# Patient Record
Sex: FEMALE | Race: WHITE | NOT HISPANIC OR LATINO | Employment: OTHER | URBAN - METROPOLITAN AREA
[De-identification: names, ages, dates, MRNs, and addresses within clinical notes are randomized per-mention and may not be internally consistent; named-entity substitution may affect disease eponyms.]

---

## 2017-01-26 ENCOUNTER — INFUSION (OUTPATIENT)
Dept: INFUSION THERAPY | Facility: HOSPITAL | Age: 82
End: 2017-01-26
Attending: INTERNAL MEDICINE
Payer: MEDICARE

## 2017-01-26 VITALS
TEMPERATURE: 98 F | RESPIRATION RATE: 16 BRPM | SYSTOLIC BLOOD PRESSURE: 152 MMHG | DIASTOLIC BLOOD PRESSURE: 58 MMHG | HEART RATE: 59 BPM

## 2017-01-26 DIAGNOSIS — N18.9 ANEMIA ASSOCIATED WITH CHRONIC RENAL FAILURE: Primary | ICD-10-CM

## 2017-01-26 DIAGNOSIS — N18.4 CKD (CHRONIC KIDNEY DISEASE), STAGE IV: ICD-10-CM

## 2017-01-26 DIAGNOSIS — D63.1 ANEMIA ASSOCIATED WITH CHRONIC RENAL FAILURE: Primary | ICD-10-CM

## 2017-01-26 PROCEDURE — 96372 THER/PROPH/DIAG INJ SC/IM: CPT

## 2017-01-26 PROCEDURE — 63600175 PHARM REV CODE 636 W HCPCS: Performed by: INTERNAL MEDICINE

## 2017-01-26 PROCEDURE — 25000003 PHARM REV CODE 250: Performed by: INTERNAL MEDICINE

## 2017-01-26 PROCEDURE — 96365 THER/PROPH/DIAG IV INF INIT: CPT

## 2017-01-26 RX ORDER — FERUMOXYTOL 510 MG/17ML
510 INJECTION INTRAVENOUS
Status: CANCELLED
Start: 2017-02-20 | End: 2017-02-20

## 2017-01-26 RX ORDER — SODIUM CHLORIDE 0.9 % (FLUSH) 0.9 %
10 SYRINGE (ML) INJECTION
Status: CANCELLED | OUTPATIENT
Start: 2017-02-20

## 2017-01-26 RX ORDER — HEPARIN 100 UNIT/ML
500 SYRINGE INTRAVENOUS
Status: CANCELLED | OUTPATIENT
Start: 2017-02-20

## 2017-01-26 RX ADMIN — DARBEPOETIN ALFA 100 MCG: 100 SOLUTION INTRAVENOUS; SUBCUTANEOUS at 10:01

## 2017-01-26 RX ADMIN — FERUMOXYTOL: 510 INJECTION INTRAVENOUS at 10:01

## 2017-01-26 NOTE — MR AVS SNAPSHOT
Patient Information     Patient Name Sex Brynn Price Female 1930      Visit Information        Provider Department Dept Phone Center    2017 10:30 AM [facility] [place] [facility] [phone] [facility]      Patient Instructions      [facility]  [address]  [address] [address]  [phone] phone     [phone] fax  Hours of Operation: Monday- Friday 8:00am- 5:00pm  After hours phone  [phone]  Hematology / Oncology Physicians on call      Dr. Neo Velarde    Please call with any concerns regarding your appointment today.FALL PREVENTION   Falls often occur due to slipping, tripping or losing your balance. Here are ways to reduce your risk of falling again.   Was there anything that caused your fall that can be fixed, removed or replaced?   Make your home safe by keeping walkways clear of objects you may trip over.   Use non-slip pads under rugs.   Do not walk in poorly lit areas.   Do not stand on chairs or wobbly ladders.   Use caution when reaching overhead or looking upward. This position can cause a loss of balance.   Be sure your shoes fit properly, have non-slip bottoms and are in good condition.   Be cautious when going up and down stairs, curbs, and when walking on uneven sidewalks.   If your balance is poor, consider using a cane or walker.   If your fall was related to alcohol use, stop or limit alcohol intake.   If your fall was related to use of sleeping medicines, talk to your doctor about this. You may need to reduce your dosage at bedtime if you awaken during the night to go to the bathroom.   To reduce the need for nighttime bathroom trips:   Avoid drinking fluids for several hours before going to bed   Empty your bladder before going to bed   Men can keep a urinal at the bedside   © 6764-5053 [employer], [address] [place] [address], [place] [address]. All rights  reserved. This information is not intended as a substitute for professional medical care. Always follow your healthcare professional's instructions.         Your Current Medications Are     allopurinol (ZYLOPRIM) 100 MG tablet    blood sugar diagnostic (ONETOUCH ULTRA TEST) Strp    carvedilol (COREG) 12.5 MG tablet    furosemide (LASIX) 40 MG tablet    glimepiride (AMARYL) 1 MG tablet      Facility-Administered Medications     darbepoetin flaquito-polysorbate injection 100 mcg    darbepoetin flaquito-polysorbate injection 100 mcg    dextrose 5 % with ferumoxytol (FERAHEME) 510 mg    ferumoxytol (FERAHEME) 510 mg in dextrose 5 % 100 mL IVPB    sodium chloride 0.9% 100 mL flush bag    sodium chloride 0.9% flush 10 mL      Appointments for Next Year     2/7/2017 11:40 AM NON FASTING LAB (10 min.) [facility], [facility]    Arrive at check-in approximately 15 minutes before your scheduled appointment time. Bring all outside medical records and imaging, along with a list of your current medications and insurance card.    2/7/2017 12:10 PM URINE (10 min.) [facility] SPECIMEN, [facility]    Arrive at check-in approximately 15 minutes before your scheduled appointment time. Bring all outside medical records and imaging, along with a list of your current medications and insurance card.    2/14/2017 12:00 PM ESTABLISHED PATIENT (30 min.) [facility]rology Neeta Oquendo MD    Arrive at check-in approximately 15 minutes before your scheduled appointment time. Bring all outside medical records and imaging, along with a list of your current medications and insurance card.    (off [facility]) 2nd floor         Default Flowsheet Data (last 24 hours)      Amb Complex Vitals [facility]        01/26/17 1111 01/26/17 0950 01/26/17 0946          Measurements    BP (!)  152/58 (!)  160/62       Temp  97.7 °F (36.5 °C)       Pulse (!)  59 61       Resp  16       Pain Assessment    Pain Score   Zero               Allergies     Ace Inhibitors     Caused hyperkalemia    Arb-angiotensin Receptor Antagonist Other (See Comments)    hyperkalemia    Codeine Nausea Only    Morphine Nausea Only      Medications You Received from 01/25/2017 1115 to 01/26/2017 1115        Date/Time Order Dose Route Action     01/26/2017 1011 darbepoetin flaquito-polysorbate injection 100 mcg 100 mcg Subcutaneous Given     01/26/2017 1009 dextrose 5 % with ferumoxytol (FERAHEME) 510 mg   Intravenous Given      Current Discharge Medication List     Cannot display discharge medications since this is not an admission.

## 2017-01-26 NOTE — PATIENT INSTRUCTIONS
[facility]  [address]  [address] [address]  [phone] phone     [phone] fax  Hours of Operation: Monday- Friday 8:00am- 5:00pm  After hours phone  [phone]  Hematology / Oncology Physicians on call      Dr. Neo Velarde    Please call with any concerns regarding your appointment today.FALL PREVENTION   Falls often occur due to slipping, tripping or losing your balance. Here are ways to reduce your risk of falling again.   Was there anything that caused your fall that can be fixed, removed or replaced?   Make your home safe by keeping walkways clear of objects you may trip over.   Use non-slip pads under rugs.   Do not walk in poorly lit areas.   Do not stand on chairs or wobbly ladders.   Use caution when reaching overhead or looking upward. This position can cause a loss of balance.   Be sure your shoes fit properly, have non-slip bottoms and are in good condition.   Be cautious when going up and down stairs, curbs, and when walking on uneven sidewalks.   If your balance is poor, consider using a cane or walker.   If your fall was related to alcohol use, stop or limit alcohol intake.   If your fall was related to use of sleeping medicines, talk to your doctor about this. You may need to reduce your dosage at bedtime if you awaken during the night to go to the bathroom.   To reduce the need for nighttime bathroom trips:   Avoid drinking fluids for several hours before going to bed   Empty your bladder before going to bed   Men can keep a urinal at the bedside   © 9705-5451 [employer], [address] [place] [address], [place] [address]. All rights reserved. This information is not intended as a substitute for professional medical care. Always follow your healthcare professional's instructions.

## 2017-01-26 NOTE — NURSING
Injection given without difficulties.Bandaid applied. Patient instructed to stay in the clinic for 15 minutes. Patient verbalized understanding and will notify nurse with any complaints.      1030 feraheme infusion complete

## 2017-01-26 NOTE — PLAN OF CARE
Problem: Patient Care Overview (Adult)  Goal: Plan of Care Review  Outcome: Ongoing (interventions implemented as appropriate)  i feel pretty good i had a little diarrhea last night but now its ok

## 2017-02-23 NOTE — TELEPHONE ENCOUNTER
----- Message from Marlene Stevenson sent at 2/23/2017  3:16 PM CST -----  [facility]  Pt's insurance is no longer covering pt's diabetic supplies. Pt's insurance will only cover accucheck or truemetrix. Pt will also need a new glucometer. Pls send rx to...    [facility] [id number]  [place], [place] - [address] [facility] [address] [place] [address] [address] [facility]  [address] [facility] [address]  [place] [address]  Phone: [phone] Fax: [phone]

## 2017-02-24 RX ORDER — LANCETS
1 EACH MISCELLANEOUS 3 TIMES DAILY
Qty: 100 EACH | Refills: 0 | Status: SHIPPED | OUTPATIENT
Start: 2017-02-24

## 2017-02-24 RX ORDER — LANCETS 26 GAUGE
1 EACH MISCELLANEOUS 3 TIMES DAILY
Qty: 100 EACH | Refills: 3 | Status: SHIPPED | OUTPATIENT
Start: 2017-02-24 | End: 2018-02-24

## 2017-02-24 RX ORDER — INSULIN PUMP SYRINGE, 3 ML
EACH MISCELLANEOUS
Qty: 1 EACH | Refills: 0 | Status: SHIPPED | OUTPATIENT
Start: 2017-02-24

## 2017-03-08 ENCOUNTER — LAB VISIT (OUTPATIENT)
Dept: LAB | Facility: HOSPITAL | Age: 82
End: 2017-03-08
Attending: INTERNAL MEDICINE
Payer: MEDICARE

## 2017-03-08 DIAGNOSIS — I73.9 PERIPHERAL VASCULAR DISEASE: ICD-10-CM

## 2017-03-08 DIAGNOSIS — E11.51 DM (DIABETES MELLITUS) WITH PERIPHERAL VASCULAR COMPLICATION: ICD-10-CM

## 2017-03-08 DIAGNOSIS — I10 ESSENTIAL HYPERTENSION: ICD-10-CM

## 2017-03-08 DIAGNOSIS — D63.1 ANEMIA ASSOCIATED WITH STAGE 4 CHRONIC RENAL FAILURE: ICD-10-CM

## 2017-03-08 DIAGNOSIS — N18.4 ANEMIA ASSOCIATED WITH STAGE 4 CHRONIC RENAL FAILURE: ICD-10-CM

## 2017-03-08 DIAGNOSIS — N18.4 CKD (CHRONIC KIDNEY DISEASE), STAGE 4 (SEVERE): ICD-10-CM

## 2017-03-08 DIAGNOSIS — E11.22 DIABETES MELLITUS WITH STAGE 4 CHRONIC KIDNEY DISEASE: ICD-10-CM

## 2017-03-08 DIAGNOSIS — M10.30 GOUT DUE TO RENAL IMPAIRMENT, UNSPECIFIED CHRONICITY, UNSPECIFIED SITE: ICD-10-CM

## 2017-03-08 DIAGNOSIS — N17.9 AKI (ACUTE KIDNEY INJURY): ICD-10-CM

## 2017-03-08 DIAGNOSIS — N18.4 DIABETES MELLITUS WITH STAGE 4 CHRONIC KIDNEY DISEASE: ICD-10-CM

## 2017-03-08 LAB
ALBUMIN SERPL BCP-MCNC: 3.3 G/DL
ANION GAP SERPL CALC-SCNC: 8 MMOL/L
ANISOCYTOSIS BLD QL SMEAR: SLIGHT
BASOPHILS # BLD AUTO: 0.02 K/UL
BASOPHILS NFR BLD: 0.2 %
BUN SERPL-MCNC: 44 MG/DL
CALCIUM SERPL-MCNC: 8.8 MG/DL
CHLORIDE SERPL-SCNC: 115 MMOL/L
CO2 SERPL-SCNC: 20 MMOL/L
CREAT SERPL-MCNC: 1.7 MG/DL
DIFFERENTIAL METHOD: ABNORMAL
EOSINOPHIL # BLD AUTO: 0.2 K/UL
EOSINOPHIL NFR BLD: 2.2 %
ERYTHROCYTE [DISTWIDTH] IN BLOOD BY AUTOMATED COUNT: 17 %
EST. GFR  (AFRICAN AMERICAN): 31 ML/MIN/1.73 M^2
EST. GFR  (NON AFRICAN AMERICAN): 26.9 ML/MIN/1.73 M^2
GLUCOSE SERPL-MCNC: 110 MG/DL
HCT VFR BLD AUTO: 24.9 %
HGB BLD-MCNC: 7.6 G/DL
HYPOCHROMIA BLD QL SMEAR: ABNORMAL
IRON SERPL-MCNC: 130 UG/DL
LYMPHOCYTES # BLD AUTO: 2.6 K/UL
LYMPHOCYTES NFR BLD: 27.2 %
MCH RBC QN AUTO: 32.9 PG
MCHC RBC AUTO-ENTMCNC: 30.5 %
MCV RBC AUTO: 108 FL
MONOCYTES # BLD AUTO: 0.9 K/UL
MONOCYTES NFR BLD: 9 %
NEUTROPHILS # BLD AUTO: 5.8 K/UL
NEUTROPHILS NFR BLD: 61.4 %
PHOSPHATE SERPL-MCNC: 3.5 MG/DL
PLATELET # BLD AUTO: 254 K/UL
PLATELET BLD QL SMEAR: ABNORMAL
PMV BLD AUTO: 11.5 FL
POTASSIUM SERPL-SCNC: 4.8 MMOL/L
PTH-INTACT SERPL-MCNC: 149 PG/ML
RBC # BLD AUTO: 2.31 M/UL
SATURATED IRON: 58 %
SODIUM SERPL-SCNC: 143 MMOL/L
TOTAL IRON BINDING CAPACITY: 223 UG/DL
TRANSFERRIN SERPL-MCNC: 151 MG/DL
WBC # BLD AUTO: 9.51 K/UL

## 2017-03-08 PROCEDURE — 85025 COMPLETE CBC W/AUTO DIFF WBC: CPT

## 2017-03-08 PROCEDURE — 83970 ASSAY OF PARATHORMONE: CPT

## 2017-03-08 PROCEDURE — 36415 COLL VENOUS BLD VENIPUNCTURE: CPT

## 2017-03-08 PROCEDURE — 80069 RENAL FUNCTION PANEL: CPT

## 2017-03-08 PROCEDURE — 83540 ASSAY OF IRON: CPT

## 2017-03-15 ENCOUNTER — OFFICE VISIT (OUTPATIENT)
Dept: NEPHROLOGY | Facility: CLINIC | Age: 82
End: 2017-03-15
Payer: MEDICARE

## 2017-03-15 ENCOUNTER — INFUSION (OUTPATIENT)
Dept: INFUSION THERAPY | Facility: HOSPITAL | Age: 82
End: 2017-03-15
Attending: INTERNAL MEDICINE
Payer: MEDICARE

## 2017-03-15 VITALS
OXYGEN SATURATION: 99 % | DIASTOLIC BLOOD PRESSURE: 67 MMHG | TEMPERATURE: 98 F | HEART RATE: 63 BPM | WEIGHT: 128.31 LBS | SYSTOLIC BLOOD PRESSURE: 187 MMHG | RESPIRATION RATE: 18 BRPM | BODY MASS INDEX: 23.61 KG/M2 | HEIGHT: 62 IN

## 2017-03-15 VITALS
HEART RATE: 68 BPM | DIASTOLIC BLOOD PRESSURE: 67 MMHG | BODY MASS INDEX: 23.61 KG/M2 | HEIGHT: 62 IN | SYSTOLIC BLOOD PRESSURE: 182 MMHG | WEIGHT: 128.31 LBS

## 2017-03-15 DIAGNOSIS — D64.9 CHRONIC ANEMIA: ICD-10-CM

## 2017-03-15 DIAGNOSIS — I10 ESSENTIAL HYPERTENSION: ICD-10-CM

## 2017-03-15 DIAGNOSIS — D63.1 ANEMIA ASSOCIATED WITH CHRONIC RENAL FAILURE: Primary | ICD-10-CM

## 2017-03-15 DIAGNOSIS — N18.4 CKD (CHRONIC KIDNEY DISEASE), STAGE IV: Primary | ICD-10-CM

## 2017-03-15 DIAGNOSIS — N25.81 SECONDARY HYPERPARATHYROIDISM (OF RENAL ORIGIN): ICD-10-CM

## 2017-03-15 DIAGNOSIS — N18.9 ANEMIA ASSOCIATED WITH CHRONIC RENAL FAILURE: Primary | ICD-10-CM

## 2017-03-15 DIAGNOSIS — N18.4 CKD (CHRONIC KIDNEY DISEASE), STAGE IV: ICD-10-CM

## 2017-03-15 DIAGNOSIS — M10.30 GOUT DUE TO RENAL IMPAIRMENT, UNSPECIFIED CHRONICITY, UNSPECIFIED SITE: ICD-10-CM

## 2017-03-15 PROCEDURE — 63600175 PHARM REV CODE 636 W HCPCS: Performed by: INTERNAL MEDICINE

## 2017-03-15 PROCEDURE — 96372 THER/PROPH/DIAG INJ SC/IM: CPT

## 2017-03-15 PROCEDURE — 1126F AMNT PAIN NOTED NONE PRSNT: CPT | Mod: S$GLB,,, | Performed by: INTERNAL MEDICINE

## 2017-03-15 PROCEDURE — 1160F RVW MEDS BY RX/DR IN RCRD: CPT | Mod: S$GLB,,, | Performed by: INTERNAL MEDICINE

## 2017-03-15 PROCEDURE — 99499 UNLISTED E&M SERVICE: CPT | Mod: S$GLB,,, | Performed by: INTERNAL MEDICINE

## 2017-03-15 PROCEDURE — 1159F MED LIST DOCD IN RCRD: CPT | Mod: S$GLB,,, | Performed by: INTERNAL MEDICINE

## 2017-03-15 PROCEDURE — 99214 OFFICE O/P EST MOD 30 MIN: CPT | Mod: S$GLB,,, | Performed by: INTERNAL MEDICINE

## 2017-03-15 PROCEDURE — 99999 PR PBB SHADOW E&M-EST. PATIENT-LVL III: CPT | Mod: PBBFAC,,, | Performed by: INTERNAL MEDICINE

## 2017-03-15 PROCEDURE — 1157F ADVNC CARE PLAN IN RCRD: CPT | Mod: S$GLB,,, | Performed by: INTERNAL MEDICINE

## 2017-03-15 RX ORDER — AMLODIPINE BESYLATE 5 MG/1
5 TABLET ORAL DAILY
Qty: 30 TABLET | Refills: 11 | Status: SHIPPED | OUTPATIENT
Start: 2017-03-15 | End: 2018-03-09 | Stop reason: SDUPTHER

## 2017-03-15 RX ADMIN — DARBEPOETIN ALFA 200 MCG: 100 SOLUTION INTRAVENOUS; SUBCUTANEOUS at 01:03

## 2017-03-15 NOTE — MR AVS SNAPSHOT
"Patient Information     Patient Name Sex Brynn Price Female 1930      Visit Information        Provider Department Dep Phone Center    3/15/2017 1:00 PM INJECTION, [facility] [phone] [facility]      Patient Instructions     None      Your Current Medications Are     allopurinol (ZYLOPRIM) 100 MG tablet    blood sugar diagnostic (ONETOUCH ULTRA TEST) Strp    blood sugar diagnostic Strp    blood-glucose meter kit    carvedilol (COREG) 12.5 MG tablet    furosemide (LASIX) 40 MG tablet    glimepiride (AMARYL) 1 MG tablet    lancets Misc    lancing device with lancets Kit    amlodipine (NORVASC) 5 MG tablet      Facility-Administered Medications     darbepoetin flaquito-polysorbate injection 100 mcg    darbepoetin flaquito-polysorbate injection 200 mcg    sodium chloride 0.9% flush 10 mL    darbepoetin flaquito-polysorbate injection 200 mcg (Discontinued)    ferumoxytol (FERAHEME) 510 mg in dextrose 5 % 100 mL IVPB (Discontinued)      Appointments for Next Year     5/10/2017 12:00 PM ESTABLISHED PATIENT (30 min.) [facility] Neeta Oquendo MD    Arrive at check-in approximately 15 minutes before your scheduled appointment time. Bring all outside medical records and imaging, along with a list of your current medications and insurance card.    (off [facility]) 2nd floor         Default Flowsheet Data (last 24 hours)      Amb Complex Vitals Lorenzo        03/15/17 1323 03/15/17 1233             Measurements    Weight 58.2 kg (128 lb 4.9 oz) 58.2 kg (128 lb 4.9 oz)       Height 5' 2" (1.575 m) 5' 2" (1.575 m)       BSA (Calculated - sq m) 1.6 sq meters 1.6 sq meters       BMI (Calculated) 23.5 23.5       BP (!)  187/67 (!)  182/67       Temp 98 °F (36.7 °C)        Pulse 63 68       Resp 18        SpO2 99 %        Pain Assessment    Pain Score Zero Zero               Allergies     Ace Inhibitors     Caused hyperkalemia    Arb-angiotensin Receptor Antagonist Other (See Comments)    " hyperkalemia    Codeine Nausea Only    Morphine Nausea Only      Medications You Received from 03/14/2017 1336 to 03/15/2017 1336        Date/Time Order Dose Route Action     03/15/2017 1334 darbepoetin flaquito-polysorbate injection 200 mcg 200 mcg Subcutaneous Given      Current Discharge Medication List     Cannot display discharge medications since this is not an admission.
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
## 2017-03-15 NOTE — MR AVS SNAPSHOT
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 [phone]      Goals (5 Years of Data)     None      Follow-Up and Disposition     Return in about 2 months (around 5/15/2017).    Follow-up and Disposition History       These Medications        Disp Refills Start End    amlodipine (NORVASC) 5 MG tablet 30 tablet 11 3/15/2017 3/15/2018    Take 1 tablet (5 mg total) by mouth once daily. - Oral    Pharmacy: [facility] [address] [place] [address] AT [facility] [place] [address] [address] Ph #: [phone]         [facility]     [facility] Nurse Care Line -  Assistance  Registered nurses in the [facility] provide clinical advisement, health education, appointment booking, and other advisory services.  Call for this free service at [phone].             Medications           Message regarding Medications     Verify the changes and/or additions to your medication regime listed below are the same as discussed with your clinician today.  If any of  "these changes or additions are incorrect, please notify your healthcare provider.        START taking these NEW medications        Refills    amlodipine (NORVASC) 5 MG tablet 11    Sig: Take 1 tablet (5 mg total) by mouth once daily.    Class: Normal    Route: Oral      STOP taking these medications     ferumoxytol (FERAHEME) 510 mg in dextrose 5 % 100 mL IVPB            Verify that the below list of medications is an accurate representation of the medications you are currently taking.  If none reported, the list may be blank. If incorrect, please contact your healthcare provider. Carry this list with you in case of emergency.           Current Medications     allopurinol (ZYLOPRIM) 100 MG tablet TAKE 2 TABLETS BY MOUTH ONCE DAILY    blood sugar diagnostic (ONETOUCH ULTRA TEST) Strp TEST BLOOD SUGAR EVERY DAY AS DIRECTED    blood sugar diagnostic Strp 1 strip by Misc.(Non-Drug; Combo Route) route 3 (three) times daily.    blood-glucose meter kit Use as instructed    carvedilol (COREG) 12.5 MG tablet Take 1 tablet (12.5 mg total) by mouth 2 (two) times daily.    furosemide (LASIX) 40 MG tablet Take 40 mg by mouth as needed.    glimepiride (AMARYL) 1 MG tablet Take 1 tablet (1 mg total) by mouth once daily.    lancets Misc 1 Stick by Misc.(Non-Drug; Combo Route) route 3 (three) times daily.    lancing device with lancets Kit 1 each by Misc.(Non-Drug; Combo Route) route 3 (three) times daily.    amlodipine (NORVASC) 5 MG tablet Take 1 tablet (5 mg total) by mouth once daily.           Clinical Reference Information           Your Vitals Were     BP Pulse Height Weight BMI    182/67 68 5' 2" (1.575 m) 58.2 kg (128 lb 4.9 oz) 23.47 kg/m2      Blood Pressure          Most Recent Value    BP  (!)  182/67      Allergies as of 3/15/2017     Ace Inhibitors    Arb-angiotensin Receptor Antagonist    Codeine    Morphine      Immunizations Administered on Date of Encounter - 3/15/2017     None      [facility] Sign-Up     " Activating your [facility] account is as easy as 1-2-3!     1) Visit [email or web address], select Sign Up Now, enter this activation code and your date of birth, then select Next.  [email or web address]  Expires: 4/29/2017 12:52 PM      2) Create a username and password to use when you visit [facility] in the future and select a security question in case you lose your password and select Next.    3) Enter your e-mail address and click Sign Up!    Additional Information  If you have questions, please e-mail [email or web address] or call [phone] to talk to our [facility] staff. Remember, [facility] is NOT to be used for urgent needs. For medical emergencies, dial 911.         Language Assistance Services     ATTENTION: Language assistance services are available, free of charge. Please call [phone].      ATENCIÓN: Si habla denise, tiene a moon disposición servicios gratuitos de asistencia lingüística. Llame al [phone].     CHÚ Ý: N?u b?n nói Ti?ng Vi?t, có các d?ch v? h? tr? ngôn ng? mi?n phí dành cho b?n. G?i s? [phone].         [facility] complies with applicable Federal civil rights laws and does not discriminate on the basis of race, color, national origin, age, disability, or sex.

## 2017-05-10 ENCOUNTER — INFUSION (OUTPATIENT)
Dept: INFUSION THERAPY | Facility: HOSPITAL | Age: 82
End: 2017-05-10
Attending: INTERNAL MEDICINE
Payer: MEDICARE

## 2017-05-10 ENCOUNTER — LAB VISIT (OUTPATIENT)
Dept: LAB | Facility: HOSPITAL | Age: 82
End: 2017-05-10
Attending: INTERNAL MEDICINE
Payer: MEDICARE

## 2017-05-10 ENCOUNTER — OFFICE VISIT (OUTPATIENT)
Dept: NEPHROLOGY | Facility: CLINIC | Age: 82
End: 2017-05-10
Payer: MEDICARE

## 2017-05-10 VITALS
SYSTOLIC BLOOD PRESSURE: 130 MMHG | WEIGHT: 127.19 LBS | HEART RATE: 64 BPM | BODY MASS INDEX: 23.4 KG/M2 | DIASTOLIC BLOOD PRESSURE: 64 MMHG | HEIGHT: 62 IN

## 2017-05-10 VITALS
WEIGHT: 127.19 LBS | TEMPERATURE: 98 F | HEIGHT: 62 IN | SYSTOLIC BLOOD PRESSURE: 146 MMHG | RESPIRATION RATE: 18 BRPM | DIASTOLIC BLOOD PRESSURE: 55 MMHG | BODY MASS INDEX: 23.4 KG/M2 | HEART RATE: 67 BPM

## 2017-05-10 DIAGNOSIS — M10.30 GOUT DUE TO RENAL IMPAIRMENT, UNSPECIFIED CHRONICITY, UNSPECIFIED SITE: ICD-10-CM

## 2017-05-10 DIAGNOSIS — N18.4 CKD (CHRONIC KIDNEY DISEASE), STAGE 4 (SEVERE): Primary | ICD-10-CM

## 2017-05-10 DIAGNOSIS — D63.1 ANEMIA ASSOCIATED WITH CHRONIC RENAL FAILURE: Primary | ICD-10-CM

## 2017-05-10 DIAGNOSIS — D64.9 CHRONIC ANEMIA: ICD-10-CM

## 2017-05-10 DIAGNOSIS — N18.9 ANEMIA ASSOCIATED WITH CHRONIC RENAL FAILURE: ICD-10-CM

## 2017-05-10 DIAGNOSIS — N18.4 DIABETES MELLITUS WITH STAGE 4 CHRONIC KIDNEY DISEASE: ICD-10-CM

## 2017-05-10 DIAGNOSIS — I73.9 PERIPHERAL VASCULAR DISEASE: ICD-10-CM

## 2017-05-10 DIAGNOSIS — N18.4 CKD (CHRONIC KIDNEY DISEASE), STAGE 4 (SEVERE): ICD-10-CM

## 2017-05-10 DIAGNOSIS — N18.4 CKD (CHRONIC KIDNEY DISEASE), STAGE IV: ICD-10-CM

## 2017-05-10 DIAGNOSIS — I10 ESSENTIAL HYPERTENSION: ICD-10-CM

## 2017-05-10 DIAGNOSIS — E11.22 DIABETES MELLITUS WITH STAGE 4 CHRONIC KIDNEY DISEASE: ICD-10-CM

## 2017-05-10 DIAGNOSIS — N18.9 ANEMIA ASSOCIATED WITH CHRONIC RENAL FAILURE: Primary | ICD-10-CM

## 2017-05-10 DIAGNOSIS — N18.4 CKD (CHRONIC KIDNEY DISEASE), STAGE IV: Primary | ICD-10-CM

## 2017-05-10 DIAGNOSIS — D63.1 ANEMIA ASSOCIATED WITH CHRONIC RENAL FAILURE: ICD-10-CM

## 2017-05-10 LAB
ALBUMIN SERPL BCP-MCNC: 3.6 G/DL
ANION GAP SERPL CALC-SCNC: 8 MMOL/L
ANISOCYTOSIS BLD QL SMEAR: SLIGHT
BASOPHILS # BLD AUTO: 0.04 K/UL
BASOPHILS NFR BLD: 0.4 %
BUN SERPL-MCNC: 52 MG/DL
CALCIUM SERPL-MCNC: 9 MG/DL
CHLORIDE SERPL-SCNC: 115 MMOL/L
CO2 SERPL-SCNC: 21 MMOL/L
CREAT SERPL-MCNC: 2 MG/DL
DACRYOCYTES BLD QL SMEAR: ABNORMAL
DIFFERENTIAL METHOD: ABNORMAL
EOSINOPHIL # BLD AUTO: 0.2 K/UL
EOSINOPHIL NFR BLD: 1.8 %
ERYTHROCYTE [DISTWIDTH] IN BLOOD BY AUTOMATED COUNT: 17 %
EST. GFR  (AFRICAN AMERICAN): 25 ML/MIN/1.73 M^2
EST. GFR  (NON AFRICAN AMERICAN): 22 ML/MIN/1.73 M^2
FERRITIN SERPL-MCNC: 1969 NG/ML
GLUCOSE SERPL-MCNC: 111 MG/DL
HCT VFR BLD AUTO: 26 %
HGB BLD-MCNC: 8.3 G/DL
HOWELL-JOLLY BOD BLD QL SMEAR: ABNORMAL
HYPOCHROMIA BLD QL SMEAR: ABNORMAL
IRON SERPL-MCNC: 84 UG/DL
LYMPHOCYTES # BLD AUTO: 3.2 K/UL
LYMPHOCYTES NFR BLD: 33.1 %
MCH RBC QN AUTO: 34 PG
MCHC RBC AUTO-ENTMCNC: 31.9 %
MCV RBC AUTO: 107 FL
MONOCYTES # BLD AUTO: 0.7 K/UL
MONOCYTES NFR BLD: 6.8 %
NEUTROPHILS # BLD AUTO: 5.6 K/UL
NEUTROPHILS NFR BLD: 57.9 %
PHOSPHATE SERPL-MCNC: 4 MG/DL
PLATELET # BLD AUTO: 287 K/UL
PLATELET BLD QL SMEAR: ABNORMAL
PMV BLD AUTO: 10.9 FL
POIKILOCYTOSIS BLD QL SMEAR: SLIGHT
POTASSIUM SERPL-SCNC: 5 MMOL/L
RBC # BLD AUTO: 2.44 M/UL
SATURATED IRON: 32 %
SODIUM SERPL-SCNC: 144 MMOL/L
TOTAL IRON BINDING CAPACITY: 266 UG/DL
TRANSFERRIN SERPL-MCNC: 180 MG/DL
WBC # BLD AUTO: 9.64 K/UL

## 2017-05-10 PROCEDURE — 99214 OFFICE O/P EST MOD 30 MIN: CPT | Mod: S$GLB,,, | Performed by: INTERNAL MEDICINE

## 2017-05-10 PROCEDURE — 85025 COMPLETE CBC W/AUTO DIFF WBC: CPT

## 2017-05-10 PROCEDURE — 1160F RVW MEDS BY RX/DR IN RCRD: CPT | Mod: S$GLB,,, | Performed by: INTERNAL MEDICINE

## 2017-05-10 PROCEDURE — 1159F MED LIST DOCD IN RCRD: CPT | Mod: S$GLB,,, | Performed by: INTERNAL MEDICINE

## 2017-05-10 PROCEDURE — 83540 ASSAY OF IRON: CPT

## 2017-05-10 PROCEDURE — 99999 PR PBB SHADOW E&M-EST. PATIENT-LVL III: CPT | Mod: PBBFAC,,, | Performed by: INTERNAL MEDICINE

## 2017-05-10 PROCEDURE — 96372 THER/PROPH/DIAG INJ SC/IM: CPT

## 2017-05-10 PROCEDURE — 63600175 PHARM REV CODE 636 W HCPCS: Performed by: INTERNAL MEDICINE

## 2017-05-10 PROCEDURE — 1126F AMNT PAIN NOTED NONE PRSNT: CPT | Mod: S$GLB,,, | Performed by: INTERNAL MEDICINE

## 2017-05-10 PROCEDURE — 36415 COLL VENOUS BLD VENIPUNCTURE: CPT

## 2017-05-10 PROCEDURE — 99499 UNLISTED E&M SERVICE: CPT | Mod: S$GLB,,, | Performed by: INTERNAL MEDICINE

## 2017-05-10 PROCEDURE — 82728 ASSAY OF FERRITIN: CPT

## 2017-05-10 PROCEDURE — 80069 RENAL FUNCTION PANEL: CPT

## 2017-05-10 RX ORDER — ERGOCALCIFEROL 1.25 MG/1
1 CAPSULE ORAL
Refills: 4 | COMMUNITY
Start: 2017-03-27

## 2017-05-10 RX ADMIN — DARBEPOETIN ALFA 200 MCG: 100 SOLUTION INTRAVENOUS; SUBCUTANEOUS at 02:05

## 2017-05-10 NOTE — PATIENT INSTRUCTIONS
1. Edema : dependant : cut back on salt intake     2. Anemia: refused GI evaluation ( refused work up) ; redose aranesp at 200 mcg q 2 months     3. Hypertension:controlled: better on norvasc 5 mg daily     4. Hyperparathyroidism:stable PTH ;keep Ergo weekly    5.DMII: controlled : taking bed time snacks ; off and possibly low sugar causing falls and pre-syncope ---stop Amaryl ;  recheck A1c was 5.3      6. CKD- stage IV: stable in last 6 months       Followup 2-3  month
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
## 2017-05-10 NOTE — MR AVS SNAPSHOT
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, unspecified chronicity, unspecified site         Essential hypertension         Diabetes mellitus with stage 4 chronic kidney disease         Peripheral vascular disease         Anemia associated with chronic renal failure                To Do List           Future Appointments        Provider Department Dept Phone    5/10/2017 1:45 PM CHAIR 02 [facility] [phone]    5/10/2017 2:45 PM LAB, SAME DAY [facility] [phone]    2017 9:40 AM LABORATORY, [facility] [address] [facility] [phone]    2017 9:50 AM SPECIMEN, [place] [phone]    2017 1:00 PM Neeta Oquendo MD [facility] [phone]      Goals (5 Years of Data)     None      Follow-Up and Disposition     Return in about 3 months (around 8/10/2017).    Follow-up and Disposition History      [facility]     [facility] Care Line -  Assistance  Unless otherwise directed by your provider, please contact [facility], our nurse care line that is available for  assistance.     Registered nurses in the [facility] provide: appointment scheduling, clinical advisement, health education, and other advisory services.  Call: [phone] (toll free)               Medications           Message regarding Medications     Verify the changes and/or additions to your medication regime  "listed below are the same as discussed with your clinician today.  If any of these changes or additions are incorrect, please notify your healthcare provider.        These medications were administered today        Dose Freq    darbepoetin flaquito-polysorbate injection 200 mcg 200 mcg Clinic/HOD 1 time    Sig: Inject 2 mLs (200 mcg total) into the skin one time.    Class: Normal    Route: Subcutaneous      STOP taking these medications     darbepoetin flaquito-polysorbate injection 100 mcg     glimepiride (AMARYL) 1 MG tablet Take 1 tablet (1 mg total) by mouth once daily.           Verify that the below list of medications is an accurate representation of the medications you are currently taking.  If none reported, the list may be blank. If incorrect, please contact your healthcare provider. Carry this list with you in case of emergency.           Current Medications     allopurinol (ZYLOPRIM) 100 MG tablet TAKE 2 TABLETS BY MOUTH ONCE DAILY    amlodipine (NORVASC) 5 MG tablet Take 1 tablet (5 mg total) by mouth once daily.    blood sugar diagnostic (ONETOUCH ULTRA TEST) Strp TEST BLOOD SUGAR EVERY DAY AS DIRECTED    blood sugar diagnostic Strp 1 strip by Misc.(Non-Drug; Combo Route) route 3 (three) times daily.    blood-glucose meter kit Use as instructed    carvedilol (COREG) 12.5 MG tablet Take 1 tablet (12.5 mg total) by mouth 2 (two) times daily.    ergocalciferol (ERGOCALCIFEROL) 50,000 unit Cap Take 1 capsule by mouth every 7 days.    furosemide (LASIX) 40 MG tablet Take 40 mg by mouth as needed.    lancets Misc 1 Stick by Misc.(Non-Drug; Combo Route) route 3 (three) times daily.    lancing device with lancets Kit 1 each by Misc.(Non-Drug; Combo Route) route 3 (three) times daily.           Clinical Reference Information           Your Vitals Were     BP Pulse Height Weight BMI    130/64 64 5' 2" (1.575 m) 57.7 kg (127 lb 3.3 oz) 23.27 kg/m2      Blood Pressure          Most Recent Value    BP  130/64    "   Allergies as of 5/10/2017     Ace Inhibitors    Arb-angiotensin Receptor Antagonist    Codeine    Morphine      Immunizations Administered on Date of Encounter - 5/10/2017     None      [facility] Sign-Up     Activating your [facility] account is as easy as 1-2-3!     1) Visit [email or web address], select Sign Up Now, enter this activation code and your date of birth, then select Next.  [email or web address]  Expires: 2017  1:34 PM      2) Create a username and password to use when you visit [facility] in the future and select a security question in case you lose your password and select Next.    3) Enter your e-mail address and click Sign Up!    Additional Information  If you have questions, please e-mail [email or web address] or call [phone] to talk to our [facility] staff. Remember, [facility] is NOT to be used for urgent needs. For medical emergencies, dial 911.         Instructions    1. Edema : dependant : cut back on salt intake     2. Anemia: refused GI evaluation ( refused work up) ; redose aranesp at 200 mcg q 2 months     3. Hypertension:controlled: better on norvasc 5 mg daily     4. Hyperparathyroidism:stable PTH ;keep Ergo weekly    5.DMII: controlled : taking bed time snacks ; off and possibly low sugar causing falls and pre-syncope ---stop Amaryl ;  recheck A1c was 5.3      6. CKD- stage IV: stable in last 6 months       Followup 2-3  month        Language Assistance Services     ATTENTION: Language assistance services are available, free of charge. Please call [phone].      ATENCIÓN: Si habla denise, tiene a moon disposición servicios gratuitos de asistencia lingüística. Llame al [phone].     CHÚ Ý: N?u b?n nói Ti?ng Vi?t, có các d?ch v? h? tr? ngôn ng? mi?n phí dành cho b?n. G?i s? [phone].         [facility] complies with applicable Federal civil rights laws and does not discriminate on the basis of race, color, national origin, age, disability, or sex.

## 2017-05-21 DIAGNOSIS — I10 ESSENTIAL HYPERTENSION: ICD-10-CM

## 2017-05-22 RX ORDER — CARVEDILOL 12.5 MG/1
TABLET ORAL
Qty: 180 TABLET | Refills: 0 | Status: SHIPPED | OUTPATIENT
Start: 2017-05-22 | End: 2017-08-19 | Stop reason: SDUPTHER

## 2017-06-14 ENCOUNTER — HOSPITAL ENCOUNTER (EMERGENCY)
Facility: HOSPITAL | Age: 82
Discharge: HOME | End: 2017-06-14
Attending: EMERGENCY MEDICINE
Payer: MEDICARE

## 2017-06-14 ENCOUNTER — OFFICE VISIT (OUTPATIENT)
Dept: INTERNAL MEDICINE | Facility: CLINIC | Age: 82
End: 2017-06-14
Payer: MEDICARE

## 2017-06-14 VITALS
OXYGEN SATURATION: 100 % | DIASTOLIC BLOOD PRESSURE: 58 MMHG | RESPIRATION RATE: 17 BRPM | SYSTOLIC BLOOD PRESSURE: 140 MMHG | TEMPERATURE: 98 F | HEART RATE: 77 BPM

## 2017-06-14 VITALS
HEIGHT: 62 IN | SYSTOLIC BLOOD PRESSURE: 120 MMHG | TEMPERATURE: 98 F | BODY MASS INDEX: 23.32 KG/M2 | DIASTOLIC BLOOD PRESSURE: 60 MMHG | OXYGEN SATURATION: 98 % | HEART RATE: 74 BPM | WEIGHT: 126.75 LBS

## 2017-06-14 DIAGNOSIS — F03.90 DEMENTIA WITHOUT BEHAVIORAL DISTURBANCE, UNSPECIFIED DEMENTIA TYPE: ICD-10-CM

## 2017-06-14 DIAGNOSIS — S09.90XA HEAD INJURY, INITIAL ENCOUNTER: Primary | ICD-10-CM

## 2017-06-14 DIAGNOSIS — R41.0 CONFUSION: ICD-10-CM

## 2017-06-14 DIAGNOSIS — S01.01XA SCALP LACERATION, INITIAL ENCOUNTER: Primary | ICD-10-CM

## 2017-06-14 PROCEDURE — 99284 EMERGENCY DEPT VISIT MOD MDM: CPT | Mod: 25

## 2017-06-14 PROCEDURE — 99213 OFFICE O/P EST LOW 20 MIN: CPT | Mod: S$GLB,,, | Performed by: FAMILY MEDICINE

## 2017-06-14 PROCEDURE — 25000003 PHARM REV CODE 250: Performed by: EMERGENCY MEDICINE

## 2017-06-14 PROCEDURE — 1159F MED LIST DOCD IN RCRD: CPT | Mod: S$GLB,,, | Performed by: FAMILY MEDICINE

## 2017-06-14 PROCEDURE — 12001 RPR S/N/AX/GEN/TRNK 2.5CM/<: CPT

## 2017-06-14 PROCEDURE — 99999 PR PBB SHADOW E&M-EST. PATIENT-LVL III: CPT | Mod: PBBFAC,,, | Performed by: FAMILY MEDICINE

## 2017-06-14 PROCEDURE — 1126F AMNT PAIN NOTED NONE PRSNT: CPT | Mod: S$GLB,,, | Performed by: FAMILY MEDICINE

## 2017-06-14 RX ORDER — LIDOCAINE HYDROCHLORIDE 10 MG/ML
1 INJECTION INFILTRATION; PERINEURAL
Status: COMPLETED | OUTPATIENT
Start: 2017-06-14 | End: 2017-06-14

## 2017-06-14 RX ORDER — ACETAMINOPHEN 500 MG
500 TABLET ORAL EVERY 6 HOURS PRN
Qty: 30 TABLET | Refills: 0 | Status: SHIPPED | OUTPATIENT
Start: 2017-06-14 | End: 2017-06-29

## 2017-06-14 RX ORDER — CEPHALEXIN 500 MG/1
500 CAPSULE ORAL EVERY 8 HOURS
Qty: 15 CAPSULE | Refills: 0 | Status: SHIPPED | OUTPATIENT
Start: 2017-06-14 | End: 2017-06-19

## 2017-06-14 RX ADMIN — LIDOCAINE HYDROCHLORIDE 1 ML: 10 INJECTION, SOLUTION INFILTRATION; PERINEURAL at 01:06
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
## 2017-06-14 NOTE — ED NOTES
Patient fell, hitting head on something while attempting to get out of bathtub today. Patient and  both have hx of dementia and neither knows how she fell.    Patient identifiers verified and correct for Brynn Augustine.    LOC: The patient is awake, alert and aware of environment with an appropriate affect, the patient is oriented x 3 and speaking appropriately.  APPEARANCE: Patient resting comfortably and in no acute distress, patient is clean and well groomed, patient's clothing is properly fastened.  SKIN: The skin is warm and dry, color consistent with ethnicity, patient has normal skin turgor and moist mucus membranes, hematoma that is actively bleeding to back of scalp, no breakdown or bruising noted.  MUSCULOSKELETAL: Patient moving all extremities spontaneously.  RESPIRATORY: Airway is open and patent, respirations are spontaneous.  CARDIAC: Patient has a normal rate, no periphreal edema noted, capillary refill < 3 seconds.  ABDOMEN: Soft and non tender to palpation.

## 2017-06-14 NOTE — ED PROVIDER NOTES
SCRIBE #1 NOTE: I, Kirt Sage, am scribing for, and in the presence of, Darryl Quiñones Jr., MD. I have scribed the entire note.      History      Chief Complaint   Patient presents with    Fall     fell and hit head, unknown if LOC       Review of patient's allergies indicates:   Allergen Reactions    Ace inhibitors      Caused hyperkalemia    Arb-angiotensin receptor antagonist Other (See Comments)     hyperkalemia    Codeine Nausea Only    Morphine Nausea Only        HPI   HPI    6/14/2017, 12:41 PM   History obtained from the patient      History of Present Illness: Brynn Augustine is a 86 y.o. female patient who presents to the Emergency Department for a head injury which onset suddenly 1 day ago after a fall while washing her leg. Symptoms are constant and moderate in severity. Pt doesn't know if she lost consciousness. No mitigating or exacerbating factors reported. No associated sx reported. Patient denies any abdominal pain, CP, SOB, HA, lightheadedness, dizziness, numbness, n/v/d, back pain, neck pain, and all other sxs at this time. No further complaints or concerns at this time.         Arrival mode: AASI    PCP: Gabby Carrera MD       Past Medical History:  Past Medical History:   Diagnosis Date    Diabetes mellitus     Gout     Hypertension     Hypoglycemia 12/6/2014    Kidney disease, chronic, stage IV (GFR 15-29 ml/min)        Past Surgical History:  History reviewed. No pertinent surgical history.      Family History:  Family History   Problem Relation Age of Onset    Diabetes      Lung cancer Mother     Cancer Father     Heart disease Brother     Heart disease Brother        Social History:  Social History     Social History Main Topics    Smoking status: Never Smoker    Smokeless tobacco: Unknown    Alcohol use No    Drug use: No    Sexual activity: Unknown       ROS   Review of Systems   Constitutional: Negative for fever.   HENT: Negative for sore throat.         + head  injury   Respiratory: Negative for shortness of breath.    Cardiovascular: Negative for chest pain.   Gastrointestinal: Negative for abdominal pain, diarrhea, nausea and vomiting.   Genitourinary: Negative for dysuria.   Musculoskeletal: Negative for back pain and neck pain.   Skin: Negative for rash.   Neurological: Negative for dizziness, weakness, light-headedness, numbness and headaches.   Hematological: Does not bruise/bleed easily.       Physical Exam      Initial Vitals   BP Pulse Resp Temp SpO2   06/14/17 1231 06/14/17 1231 06/14/17 1231 06/14/17 1234 06/14/17 1231   (!) 176/76 70 18 98.1 °F (36.7 °C) 98 %      Physical Exam  Nursing Notes and Vital Signs Reviewed.  Constitutional: Patient is in no acute distress. Well-developed and well-nourished.  Head:  Normocephalic. 2 cm laceration to the back of the scalp. No active bleeding.  Eyes: PERRL. EOM intact. Conjunctivae are not pale. No scleral icterus.  ENT: Mucous membranes are moist. Oropharynx is clear and symmetric.    Neck: Supple. Full ROM. No lymphadenopathy.  Cardiovascular: Regular rate. Regular rhythm. No murmurs, rubs, or gallops. Distal pulses are 2+ and symmetric.  Pulmonary/Chest: No respiratory distress. Clear to auscultation bilaterally. No wheezing, rales, or rhonchi.  Abdominal: Soft and non-distended.  There is no tenderness.  No rebound, guarding, or rigidity.   Musculoskeletal: Moves all extremities. No obvious deformities. No edema.   Skin: Warm and dry.  Neurological:  Alert, awake, and appropriate.  Normal speech.  No acute focal neurological deficits are appreciated.  Psychiatric: Normal affect. Good eye contact. Appropriate in content.    ED Course    Lac Repair  Date/Time: 6/14/2017 2:05 PM  Performed by: KEENA ALMANZA JR  Authorized by: KEENA ALMANZA JR   Body area: head/neck  Location details: scalp  Laceration length: 2 cm  Foreign bodies: no foreign bodies  Tendon involvement: none  Nerve involvement: none  Anesthesia: local  infiltration    Anesthesia:  Anesthesia: local infiltration  Local Anesthetic: lidocaine 1% without epinephrine   Preparation: Patient was prepped and draped in the usual sterile fashion.  Irrigation solution: alcohol wipe.  Irrigation method: tap  Amount of cleaning: standard  Debridement: none  Degree of undermining: none  Skin closure: staples  Number of sutures: 4  Technique: simple  Approximation: close  Approximation difficulty: simple  Patient tolerance: Patient tolerated the procedure well with no immediate complications        ED Vital Signs:  Vitals:    06/14/17 1231 06/14/17 1234   BP: (!) 176/76    Pulse: 70    Resp: 18    Temp:  98.1 °F (36.7 °C)   TempSrc:  Oral   SpO2: 98%        Abnormal Lab Results:  Labs Reviewed   CBC W/ AUTO DIFFERENTIAL   BASIC METABOLIC PANEL   URINALYSIS        All Lab Results:      Imaging Results:  Imaging Results          CT Head Without Contrast (Final result)  Result time 06/14/17 13:35:52    Final result by Kirt Luis III, MD (06/14/17 13:35:52)                 Impression:        1. Atrophy. White matter changes. No bleed or other acute abnormality suggested.    2. Borderline ventricular prominence for this degree of atrophy. Please correlate.    All CT scans at this facility use dose modulation, iterative reconstruction, and/or weight based dosing when appropriate to reduce radiation dose to as low as reasonably achievable.      Electronically signed by: KIRT LUIS MD  Date:     06/14/17  Time:    13:35              Narrative:    The CT of the head without contrast.    Clinical indication: fall.Trauma to the head.    Standard noncontrast CT scan of the brain. No previous for comparison.    The brain and ventricles demonstrate moderate changes of atrophy, both supra-and infratentorial. There are changes of low-attenuation in the periventricular and subcortical white matter. There is moderate ventricular prominence felt to be borderline for this degree  of atrophy. While this may be upper limits of normal, abnormality such as normal pressure hydrocephalus would be impossible to exclude, please correlate.                                      The Emergency Provider reviewed the vital signs and test results, which are outlined above.    ED Discussion     2:14 PM: Reassessed pt at this time.  Pt is awake, alert, and in no distress. Pt's children have arrived to bring the pt home. Discussed with pt all pertinent ED information and results. Discussed pt dx and plan of tx. Gave pt all f/u and return to the ED instructions. All questions and concerns were addressed at this time. Pt expresses understanding of information and instructions, and is comfortable with plan to discharge. Pt is stable for discharge.    I discussed wound care precautions with patient and/or family/caretaker; specifically that all wounds have risk of infection despite efforts to cleanse and debride the wound; and there is a risk of an occult foreign body (and thus increased risk of infection) despite a negative examination.  I discussed with patient need to return for any signs of infection, specifically redness, increased pain, fever, drainage of pus, or any concern, immediately.      ED Medication(s):  Medications   lidocaine HCL 10 mg/ml (1%) injection 1 mL (1 mL Other Given 6/14/17 1301)       New Prescriptions    ACETAMINOPHEN (TYLENOL) 500 MG TABLET    Take 1 tablet (500 mg total) by mouth every 6 (six) hours as needed for Pain.    CEPHALEXIN (KEFLEX) 500 MG CAPSULE    Take 1 capsule (500 mg total) by mouth every 8 (eight) hours.             Medical Decision Making    Medical Decision Making:   Clinical Tests:   Lab Tests: Reviewed and Ordered  Radiological Study: Reviewed and Ordered           Scribe Attestation:   Scribe #1: I performed the above scribed service and the documentation accurately describes the services I performed. I attest to the accuracy of the note.    Attending:   Physician  Attestation Statement for Scribe #1: I, Darryl Quiñones Jr., MD, personally performed the services described in this documentation, as scribed by Kirt Sage, in my presence, and it is both accurate and complete.          Clinical Impression       ICD-10-CM ICD-9-CM   1. Scalp laceration, initial encounter S01.01XA 873.0   2. Dementia without behavioral disturbance, unspecified dementia type F03.90 294.20       Disposition:   Disposition: Discharged  Condition: Stable         Darryl Quiñones Jr., MD  06/14/17 0006

## 2017-06-14 NOTE — ED NOTES
Discharge instructions explained to patient and family. Family verbalizes understanding. Patient and discharge paperwork escorted to registration desk by RN at this time. Discharge paperwork given to registration for completion of discharge.
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
## 2017-06-16 ENCOUNTER — OUTPATIENT CASE MANAGEMENT (OUTPATIENT)
Dept: ADMINISTRATIVE | Facility: OTHER | Age: 82
End: 2017-06-16

## 2017-06-16 ENCOUNTER — TELEPHONE (OUTPATIENT)
Dept: INTERNAL MEDICINE | Facility: CLINIC | Age: 82
End: 2017-06-16
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
## 2017-06-16 NOTE — PROGRESS NOTES
Attempt #:  1  This CSW attempted to reach patient/caregiver to provide resource and patient reported that she  was not available to complete assessment at this time. Patient requested CSW contact her on   Tuesday next week, 6/20/2017. Referral source notified
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
## 2017-06-20 ENCOUNTER — OUTPATIENT CASE MANAGEMENT (OUTPATIENT)
Dept: ADMINISTRATIVE | Facility: OTHER | Age: 82
End: 2017-06-20

## 2017-06-20 ENCOUNTER — TELEPHONE (OUTPATIENT)
Dept: INTERNAL MEDICINE | Facility: CLINIC | Age: 82
End: 2017-06-20

## 2017-06-20 NOTE — TELEPHONE ENCOUNTER
----- Message from Aniyah Riley sent at 6/20/2017  9:30 AM CDT -----  Pt is requesting a call from nurse to discuss questions regarding a staple removal.            Please call pt back at [phone]

## 2017-06-20 NOTE — TELEPHONE ENCOUNTER
----- Message from SHIRA Ledesma sent at 6/20/2017 11:21 AM CDT -----  This CSW received a referral on the above patient. I have attempted to contact the patient or caregiver by phone two times unsuccessfully and mailed a letter with our contact information.    Thank you for the referral,    SHIRA Ledesma

## 2017-06-20 NOTE — LETTER
June 20, 2017    Brynn Augustine  [address] [place] [address]             [facility] [address] [place] [address] Dear Ms. Augustine:    I am writing from the Outpatient Complex Care Management Department at [facility].  I received a referral from Dr. Carrera to contact you or your caregiver regarding any needs you may have. I have attempted to contact you or your cargeiver by phone two times unsuccessfully.  Please contact the Outpatient Complex Care Management Department at [phone] if you would like to discuss your needs.      Sincerely,         SHIRA Ledesma

## 2017-06-20 NOTE — PROGRESS NOTES
Attempt #:  2  This CSW attempted to reach patient/caregiver to provide resource and was unable to leave msg requesting a return call.   Letter with contact information was sent via US mail to patient/caregiver. Referral source notified.

## 2017-06-29 ENCOUNTER — OFFICE VISIT (OUTPATIENT)
Dept: INTERNAL MEDICINE | Facility: CLINIC | Age: 82
End: 2017-06-29
Payer: MEDICARE

## 2017-06-29 VITALS
BODY MASS INDEX: 23.13 KG/M2 | DIASTOLIC BLOOD PRESSURE: 60 MMHG | WEIGHT: 125.69 LBS | HEART RATE: 72 BPM | HEIGHT: 62 IN | OXYGEN SATURATION: 95 % | TEMPERATURE: 99 F | SYSTOLIC BLOOD PRESSURE: 110 MMHG

## 2017-06-29 DIAGNOSIS — Z48.02 ENCOUNTER FOR STAPLE REMOVAL: Primary | ICD-10-CM

## 2017-06-29 PROCEDURE — 99213 OFFICE O/P EST LOW 20 MIN: CPT | Mod: S$GLB,,, | Performed by: FAMILY MEDICINE

## 2017-06-29 PROCEDURE — 99999 PR PBB SHADOW E&M-EST. PATIENT-LVL III: CPT | Mod: PBBFAC,,, | Performed by: FAMILY MEDICINE

## 2017-06-29 PROCEDURE — 1126F AMNT PAIN NOTED NONE PRSNT: CPT | Mod: S$GLB,,, | Performed by: FAMILY MEDICINE

## 2017-06-29 PROCEDURE — 1159F MED LIST DOCD IN RCRD: CPT | Mod: S$GLB,,, | Performed by: FAMILY MEDICINE

## 2017-06-29 NOTE — PATIENT INSTRUCTIONS
Keep scalp wound clean, allow scabs to fall off on their own, follow up for any signs of infection

## 2017-06-30 DIAGNOSIS — E11.9 TYPE 2 DIABETES MELLITUS WITHOUT COMPLICATION: ICD-10-CM
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
## 2017-08-19 DIAGNOSIS — I10 ESSENTIAL HYPERTENSION: ICD-10-CM

## 2017-08-21 RX ORDER — CARVEDILOL 12.5 MG/1
TABLET ORAL
Qty: 180 TABLET | Refills: 0 | Status: SHIPPED | OUTPATIENT
Start: 2017-08-21 | End: 2017-11-17 | Stop reason: SDUPTHER

## 2017-08-23 ENCOUNTER — LAB VISIT (OUTPATIENT)
Dept: LAB | Facility: HOSPITAL | Age: 82
End: 2017-08-23
Attending: INTERNAL MEDICINE
Payer: MEDICARE

## 2017-08-23 DIAGNOSIS — I10 ESSENTIAL HYPERTENSION: ICD-10-CM

## 2017-08-23 DIAGNOSIS — N17.9 AKI (ACUTE KIDNEY INJURY): ICD-10-CM

## 2017-08-23 DIAGNOSIS — E11.51 DM (DIABETES MELLITUS) WITH PERIPHERAL VASCULAR COMPLICATION: ICD-10-CM

## 2017-08-23 DIAGNOSIS — D63.1 ANEMIA ASSOCIATED WITH STAGE 4 CHRONIC RENAL FAILURE: ICD-10-CM

## 2017-08-23 DIAGNOSIS — E11.22 DIABETES MELLITUS WITH STAGE 4 CHRONIC KIDNEY DISEASE: ICD-10-CM

## 2017-08-23 DIAGNOSIS — I73.9 PERIPHERAL VASCULAR DISEASE: ICD-10-CM

## 2017-08-23 DIAGNOSIS — N18.4 STAGE 4 CHRONIC KIDNEY DISEASE: ICD-10-CM

## 2017-08-23 DIAGNOSIS — N18.4 ANEMIA ASSOCIATED WITH STAGE 4 CHRONIC RENAL FAILURE: ICD-10-CM

## 2017-08-23 DIAGNOSIS — N18.4 DIABETES MELLITUS WITH STAGE 4 CHRONIC KIDNEY DISEASE: ICD-10-CM

## 2017-08-23 DIAGNOSIS — M10.30 GOUT DUE TO RENAL IMPAIRMENT, UNSPECIFIED CHRONICITY, UNSPECIFIED SITE: ICD-10-CM

## 2017-08-23 LAB
ALBUMIN SERPL BCP-MCNC: 3 G/DL
ANION GAP SERPL CALC-SCNC: 7 MMOL/L
ANISOCYTOSIS BLD QL SMEAR: SLIGHT
BASOPHILS # BLD AUTO: 0.03 K/UL
BASOPHILS NFR BLD: 0.3 %
BUN SERPL-MCNC: 38 MG/DL
CALCIUM SERPL-MCNC: 9.1 MG/DL
CHLORIDE SERPL-SCNC: 112 MMOL/L
CO2 SERPL-SCNC: 24 MMOL/L
CREAT SERPL-MCNC: 1.8 MG/DL
DIFFERENTIAL METHOD: ABNORMAL
EOSINOPHIL # BLD AUTO: 0.4 K/UL
EOSINOPHIL NFR BLD: 4.4 %
ERYTHROCYTE [DISTWIDTH] IN BLOOD BY AUTOMATED COUNT: 17 %
EST. GFR  (AFRICAN AMERICAN): 28.8 ML/MIN/1.73 M^2
EST. GFR  (NON AFRICAN AMERICAN): 24.9 ML/MIN/1.73 M^2
GLUCOSE SERPL-MCNC: 126 MG/DL
HCT VFR BLD AUTO: 25 %
HGB BLD-MCNC: 7.8 G/DL
HYPOCHROMIA BLD QL SMEAR: ABNORMAL
IRON SERPL-MCNC: 78 UG/DL
LYMPHOCYTES # BLD AUTO: 2.6 K/UL
LYMPHOCYTES NFR BLD: 30.2 %
MCH RBC QN AUTO: 33.1 PG
MCHC RBC AUTO-ENTMCNC: 31.2 G/DL
MCV RBC AUTO: 106 FL
MONOCYTES # BLD AUTO: 0.7 K/UL
MONOCYTES NFR BLD: 8.1 %
NEUTROPHILS # BLD AUTO: 4.8 K/UL
NEUTROPHILS NFR BLD: 57 %
PHOSPHATE SERPL-MCNC: 3.8 MG/DL
PLATELET # BLD AUTO: 339 K/UL
PLATELET BLD QL SMEAR: ABNORMAL
PMV BLD AUTO: 10.7 FL
POLYCHROMASIA BLD QL SMEAR: ABNORMAL
POTASSIUM SERPL-SCNC: 4.9 MMOL/L
PTH-INTACT SERPL-MCNC: 172 PG/ML
RBC # BLD AUTO: 2.36 M/UL
SATURATED IRON: 32 %
SODIUM SERPL-SCNC: 143 MMOL/L
TOTAL IRON BINDING CAPACITY: 247 UG/DL
TRANSFERRIN SERPL-MCNC: 167 MG/DL
WBC # BLD AUTO: 8.62 K/UL

## 2017-08-23 PROCEDURE — 83970 ASSAY OF PARATHORMONE: CPT

## 2017-08-23 PROCEDURE — 85025 COMPLETE CBC W/AUTO DIFF WBC: CPT

## 2017-08-23 PROCEDURE — 36415 COLL VENOUS BLD VENIPUNCTURE: CPT

## 2017-08-23 PROCEDURE — 80069 RENAL FUNCTION PANEL: CPT

## 2017-08-23 PROCEDURE — 83540 ASSAY OF IRON: CPT

## 2017-08-25 ENCOUNTER — TELEPHONE (OUTPATIENT)
Dept: INFUSION THERAPY | Facility: HOSPITAL | Age: 82
End: 2017-08-25

## 2017-08-30 ENCOUNTER — OFFICE VISIT (OUTPATIENT)
Dept: NEPHROLOGY | Facility: CLINIC | Age: 82
End: 2017-08-30
Payer: MEDICARE

## 2017-08-30 ENCOUNTER — INFUSION (OUTPATIENT)
Dept: INFUSION THERAPY | Facility: HOSPITAL | Age: 82
End: 2017-08-30
Attending: INTERNAL MEDICINE
Payer: MEDICARE

## 2017-08-30 VITALS
WEIGHT: 127 LBS | DIASTOLIC BLOOD PRESSURE: 56 MMHG | BODY MASS INDEX: 23.98 KG/M2 | HEIGHT: 61 IN | SYSTOLIC BLOOD PRESSURE: 134 MMHG | HEART RATE: 74 BPM

## 2017-08-30 VITALS — WEIGHT: 127 LBS | BODY MASS INDEX: 24 KG/M2

## 2017-08-30 DIAGNOSIS — N18.9 ANEMIA ASSOCIATED WITH CHRONIC RENAL FAILURE: ICD-10-CM

## 2017-08-30 DIAGNOSIS — N18.9 ANEMIA ASSOCIATED WITH CHRONIC RENAL FAILURE: Primary | ICD-10-CM

## 2017-08-30 DIAGNOSIS — D63.1 ANEMIA ASSOCIATED WITH CHRONIC RENAL FAILURE: Primary | ICD-10-CM

## 2017-08-30 DIAGNOSIS — I73.9 PERIPHERAL VASCULAR DISEASE: ICD-10-CM

## 2017-08-30 DIAGNOSIS — M10.30 GOUT DUE TO RENAL IMPAIRMENT, UNSPECIFIED CHRONICITY, UNSPECIFIED SITE: ICD-10-CM

## 2017-08-30 DIAGNOSIS — N18.4 CKD (CHRONIC KIDNEY DISEASE), STAGE IV: ICD-10-CM

## 2017-08-30 DIAGNOSIS — D63.1 ANEMIA ASSOCIATED WITH CHRONIC RENAL FAILURE: ICD-10-CM

## 2017-08-30 DIAGNOSIS — I10 ESSENTIAL HYPERTENSION: ICD-10-CM

## 2017-08-30 DIAGNOSIS — N18.4 DIABETES MELLITUS WITH STAGE 4 CHRONIC KIDNEY DISEASE: ICD-10-CM

## 2017-08-30 DIAGNOSIS — N18.4 CKD (CHRONIC KIDNEY DISEASE), STAGE IV: Primary | ICD-10-CM

## 2017-08-30 DIAGNOSIS — E11.22 DIABETES MELLITUS WITH STAGE 4 CHRONIC KIDNEY DISEASE: ICD-10-CM

## 2017-08-30 PROCEDURE — 63600175 PHARM REV CODE 636 W HCPCS: Performed by: INTERNAL MEDICINE

## 2017-08-30 PROCEDURE — 99499 UNLISTED E&M SERVICE: CPT | Mod: S$GLB,,, | Performed by: INTERNAL MEDICINE

## 2017-08-30 PROCEDURE — 1159F MED LIST DOCD IN RCRD: CPT | Mod: S$GLB,,, | Performed by: INTERNAL MEDICINE

## 2017-08-30 PROCEDURE — 96372 THER/PROPH/DIAG INJ SC/IM: CPT

## 2017-08-30 PROCEDURE — 1126F AMNT PAIN NOTED NONE PRSNT: CPT | Mod: S$GLB,,, | Performed by: INTERNAL MEDICINE

## 2017-08-30 PROCEDURE — 3008F BODY MASS INDEX DOCD: CPT | Mod: S$GLB,,, | Performed by: INTERNAL MEDICINE

## 2017-08-30 PROCEDURE — 99214 OFFICE O/P EST MOD 30 MIN: CPT | Mod: S$GLB,,, | Performed by: INTERNAL MEDICINE

## 2017-08-30 PROCEDURE — 99999 PR PBB SHADOW E&M-EST. PATIENT-LVL III: CPT | Mod: PBBFAC,,, | Performed by: INTERNAL MEDICINE

## 2017-08-30 RX ADMIN — DARBEPOETIN ALFA 200 MCG: 200 INJECTION, SOLUTION INTRAVENOUS; SUBCUTANEOUS at 02:08
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
## 2017-08-30 NOTE — PATIENT INSTRUCTIONS
[facility]  [address]  [address] [address]  [phone] phone     [phone] fax  Hours of Operation: Monday- Friday 8:00am- 5:00pm  After hours phone  [phone]  Hematology / Oncology Physicians on call      Dr. Neo Abarca                        Please call with any concerns regarding your appointment today.  FALL PREVENTION   Falls often occur due to slipping, tripping or losing your balance. Here are ways to reduce your risk of falling again.   Was there anything that caused your fall that can be fixed, removed or replaced?   Make your home safe by keeping walkways clear of objects you may trip over.   Use non-slip pads under rugs.   Do not walk in poorly lit areas.   Do not stand on chairs or wobbly ladders.   Use caution when reaching overhead or looking upward. This position can cause a loss of balance.   Be sure your shoes fit properly, have non-slip bottoms and are in good condition.   Be cautious when going up and down stairs, curbs, and when walking on uneven sidewalks.   If your balance is poor, consider using a cane or walker.   If your fall was related to alcohol use, stop or limit alcohol intake.   If your fall was related to use of sleeping medicines, talk to your doctor about this. You may need to reduce your dosage at bedtime if you awaken during the night to go to the bathroom.   To reduce the need for nighttime bathroom trips:   Avoid drinking fluids for several hours before going to bed   Empty your bladder before going to bed   Men can keep a urinal at the bedside   © 6155-1381 [employer], [address] [place] [address], [place] [address]. All rights reserved. This information is not intended as a substitute for professional medical care. Always follow your healthcare professional's instructions.

## 2017-09-17 DIAGNOSIS — E11.22 DIABETES MELLITUS WITH STAGE 4 CHRONIC KIDNEY DISEASE: ICD-10-CM

## 2017-09-17 DIAGNOSIS — E11.51 DM (DIABETES MELLITUS) WITH PERIPHERAL VASCULAR COMPLICATION: ICD-10-CM

## 2017-09-17 DIAGNOSIS — N18.4 DIABETES MELLITUS WITH STAGE 4 CHRONIC KIDNEY DISEASE: ICD-10-CM

## 2017-09-17 RX ORDER — GLIMEPIRIDE 1 MG/1
TABLET ORAL
Qty: 90 TABLET | Refills: 0
Start: 2017-09-17

## 2017-11-17 DIAGNOSIS — I10 ESSENTIAL HYPERTENSION: ICD-10-CM

## 2017-11-17 RX ORDER — CARVEDILOL 12.5 MG/1
TABLET ORAL
Qty: 180 TABLET | Refills: 0 | Status: SHIPPED | OUTPATIENT
Start: 2017-11-17 | End: 2018-02-14 | Stop reason: SDUPTHER

## 2017-11-27 PROBLEM — E11.69 COMBINED HYPERLIPIDEMIA ASSOCIATED WITH TYPE 2 DIABETES MELLITUS: Status: ACTIVE | Noted: 2017-11-27

## 2017-11-27 PROBLEM — Z85.3 HISTORY OF RIGHT BREAST CANCER: Chronic | Status: ACTIVE | Noted: 2017-11-27

## 2017-11-27 PROBLEM — E78.2 COMBINED HYPERLIPIDEMIA ASSOCIATED WITH TYPE 2 DIABETES MELLITUS: Status: ACTIVE | Noted: 2017-11-27

## 2017-11-27 PROBLEM — Z85.828 HISTORY OF MOH'S MICROGRAPHIC SURGERY FOR SKIN CANCER: Chronic | Status: ACTIVE | Noted: 2017-11-27

## 2017-11-27 PROBLEM — E78.5 DYSLIPIDEMIA ASSOCIATED WITH TYPE 2 DIABETES MELLITUS: Status: ACTIVE | Noted: 2017-11-27

## 2017-11-27 PROBLEM — E11.69 DYSLIPIDEMIA ASSOCIATED WITH TYPE 2 DIABETES MELLITUS: Chronic | Status: ACTIVE | Noted: 2017-11-27

## 2017-11-27 PROBLEM — Z98.890 HISTORY OF MOH'S MICROGRAPHIC SURGERY FOR SKIN CANCER: Status: ACTIVE | Noted: 2017-11-27

## 2017-11-27 PROBLEM — Z98.890 HISTORY OF MOH'S MICROGRAPHIC SURGERY FOR SKIN CANCER: Chronic | Status: ACTIVE | Noted: 2017-11-27

## 2017-11-27 PROBLEM — E78.5 DYSLIPIDEMIA ASSOCIATED WITH TYPE 2 DIABETES MELLITUS: Chronic | Status: ACTIVE | Noted: 2017-11-27

## 2017-11-27 PROBLEM — I77.9 BILATERAL CAROTID ARTERY DISEASE: Status: ACTIVE | Noted: 2017-11-27

## 2017-11-27 PROBLEM — I77.9 BILATERAL CAROTID ARTERY DISEASE: Chronic | Status: ACTIVE | Noted: 2017-11-27

## 2017-11-27 PROBLEM — Z85.828 HISTORY OF MOH'S MICROGRAPHIC SURGERY FOR SKIN CANCER: Status: ACTIVE | Noted: 2017-11-27

## 2017-11-27 PROBLEM — Z85.3 HISTORY OF RIGHT BREAST CANCER: Status: ACTIVE | Noted: 2017-11-27

## 2017-12-06 ENCOUNTER — LAB VISIT (OUTPATIENT)
Dept: LAB | Facility: HOSPITAL | Age: 82
End: 2017-12-06
Attending: INTERNAL MEDICINE
Payer: MEDICARE

## 2017-12-06 DIAGNOSIS — I73.9 PERIPHERAL VASCULAR DISEASE: ICD-10-CM

## 2017-12-06 DIAGNOSIS — I10 ESSENTIAL HYPERTENSION: ICD-10-CM

## 2017-12-06 DIAGNOSIS — N18.4 CKD (CHRONIC KIDNEY DISEASE), STAGE IV: ICD-10-CM

## 2017-12-06 DIAGNOSIS — M10.30 GOUT DUE TO RENAL IMPAIRMENT, UNSPECIFIED CHRONICITY, UNSPECIFIED SITE: ICD-10-CM

## 2017-12-06 DIAGNOSIS — D63.1 ANEMIA ASSOCIATED WITH CHRONIC RENAL FAILURE: ICD-10-CM

## 2017-12-06 DIAGNOSIS — N18.4 DIABETES MELLITUS WITH STAGE 4 CHRONIC KIDNEY DISEASE: ICD-10-CM

## 2017-12-06 DIAGNOSIS — E11.22 DIABETES MELLITUS WITH STAGE 4 CHRONIC KIDNEY DISEASE: ICD-10-CM

## 2017-12-06 DIAGNOSIS — N18.9 ANEMIA ASSOCIATED WITH CHRONIC RENAL FAILURE: ICD-10-CM

## 2017-12-06 LAB
ALBUMIN SERPL BCP-MCNC: 3.3 G/DL
ANION GAP SERPL CALC-SCNC: 8 MMOL/L
BASOPHILS # BLD AUTO: 0.04 K/UL
BASOPHILS NFR BLD: 0.6 %
BUN SERPL-MCNC: 33 MG/DL
CALCIUM SERPL-MCNC: 8.7 MG/DL
CHLORIDE SERPL-SCNC: 110 MMOL/L
CO2 SERPL-SCNC: 24 MMOL/L
CREAT SERPL-MCNC: 1.8 MG/DL
DIFFERENTIAL METHOD: ABNORMAL
EOSINOPHIL # BLD AUTO: 0.5 K/UL
EOSINOPHIL NFR BLD: 6.8 %
ERYTHROCYTE [DISTWIDTH] IN BLOOD BY AUTOMATED COUNT: 17.3 %
EST. GFR  (AFRICAN AMERICAN): 28.8 ML/MIN/1.73 M^2
EST. GFR  (NON AFRICAN AMERICAN): 24.9 ML/MIN/1.73 M^2
GLUCOSE SERPL-MCNC: 157 MG/DL
HCT VFR BLD AUTO: 23.9 %
HGB BLD-MCNC: 7.5 G/DL
IMM GRANULOCYTES # BLD AUTO: 0.04 K/UL
IMM GRANULOCYTES NFR BLD AUTO: 0.6 %
LYMPHOCYTES # BLD AUTO: 2.2 K/UL
LYMPHOCYTES NFR BLD: 31.9 %
MCH RBC QN AUTO: 33.6 PG
MCHC RBC AUTO-ENTMCNC: 31.4 G/DL
MCV RBC AUTO: 107 FL
MONOCYTES # BLD AUTO: 0.6 K/UL
MONOCYTES NFR BLD: 9.2 %
NEUTROPHILS # BLD AUTO: 3.4 K/UL
NEUTROPHILS NFR BLD: 50.9 %
NRBC BLD-RTO: 1 /100 WBC
PHOSPHATE SERPL-MCNC: 3.4 MG/DL
PLATELET # BLD AUTO: 238 K/UL
PMV BLD AUTO: 11.7 FL
POTASSIUM SERPL-SCNC: 4.1 MMOL/L
PTH-INTACT SERPL-MCNC: 186 PG/ML
RBC # BLD AUTO: 2.23 M/UL
SODIUM SERPL-SCNC: 142 MMOL/L
WBC # BLD AUTO: 6.74 K/UL

## 2017-12-06 PROCEDURE — 80069 RENAL FUNCTION PANEL: CPT

## 2017-12-06 PROCEDURE — 83970 ASSAY OF PARATHORMONE: CPT

## 2017-12-06 PROCEDURE — 36415 COLL VENOUS BLD VENIPUNCTURE: CPT

## 2017-12-06 PROCEDURE — 85025 COMPLETE CBC W/AUTO DIFF WBC: CPT

## 2017-12-13 ENCOUNTER — OFFICE VISIT (OUTPATIENT)
Dept: INTERNAL MEDICINE | Facility: CLINIC | Age: 82
End: 2017-12-13
Payer: MEDICARE

## 2017-12-13 ENCOUNTER — INFUSION (OUTPATIENT)
Dept: INFUSION THERAPY | Facility: HOSPITAL | Age: 82
End: 2017-12-13
Attending: INTERNAL MEDICINE
Payer: MEDICARE

## 2017-12-13 ENCOUNTER — OFFICE VISIT (OUTPATIENT)
Dept: NEPHROLOGY | Facility: CLINIC | Age: 82
End: 2017-12-13
Payer: MEDICARE

## 2017-12-13 VITALS
HEART RATE: 74 BPM | SYSTOLIC BLOOD PRESSURE: 122 MMHG | WEIGHT: 127.19 LBS | DIASTOLIC BLOOD PRESSURE: 60 MMHG | HEIGHT: 62 IN | BODY MASS INDEX: 23.4 KG/M2

## 2017-12-13 VITALS
BODY MASS INDEX: 24.01 KG/M2 | OXYGEN SATURATION: 98 % | HEIGHT: 61 IN | WEIGHT: 127.19 LBS | DIASTOLIC BLOOD PRESSURE: 64 MMHG | HEART RATE: 73 BPM | SYSTOLIC BLOOD PRESSURE: 126 MMHG

## 2017-12-13 DIAGNOSIS — E78.5 DYSLIPIDEMIA ASSOCIATED WITH TYPE 2 DIABETES MELLITUS: Chronic | ICD-10-CM

## 2017-12-13 DIAGNOSIS — I73.9 PERIPHERAL VASCULAR DISEASE: ICD-10-CM

## 2017-12-13 DIAGNOSIS — Z91.81 AT RISK FOR FALLS: ICD-10-CM

## 2017-12-13 DIAGNOSIS — M10.30 GOUT DUE TO RENAL IMPAIRMENT, UNSPECIFIED CHRONICITY, UNSPECIFIED SITE: ICD-10-CM

## 2017-12-13 DIAGNOSIS — D63.1 ANEMIA ASSOCIATED WITH CHRONIC RENAL FAILURE: ICD-10-CM

## 2017-12-13 DIAGNOSIS — M10.30 GOUT DUE TO RENAL IMPAIRMENT, UNSPECIFIED CHRONICITY, UNSPECIFIED SITE: Chronic | ICD-10-CM

## 2017-12-13 DIAGNOSIS — Z85.828 HISTORY OF MOH'S MICROGRAPHIC SURGERY FOR SKIN CANCER: Chronic | ICD-10-CM

## 2017-12-13 DIAGNOSIS — D63.1 ANEMIA ASSOCIATED WITH CHRONIC RENAL FAILURE: Primary | ICD-10-CM

## 2017-12-13 DIAGNOSIS — Z98.890 HISTORY OF MOH'S MICROGRAPHIC SURGERY FOR SKIN CANCER: Chronic | ICD-10-CM

## 2017-12-13 DIAGNOSIS — H91.93 DECREASED HEARING OF BOTH EARS: ICD-10-CM

## 2017-12-13 DIAGNOSIS — I77.9 BILATERAL CAROTID ARTERY DISEASE: Chronic | ICD-10-CM

## 2017-12-13 DIAGNOSIS — N18.9 ANEMIA ASSOCIATED WITH CHRONIC RENAL FAILURE: ICD-10-CM

## 2017-12-13 DIAGNOSIS — N18.4 DIABETES MELLITUS WITH STAGE 4 CHRONIC KIDNEY DISEASE: ICD-10-CM

## 2017-12-13 DIAGNOSIS — N18.4 CKD (CHRONIC KIDNEY DISEASE), STAGE IV: Primary | ICD-10-CM

## 2017-12-13 DIAGNOSIS — I10 ESSENTIAL HYPERTENSION: ICD-10-CM

## 2017-12-13 DIAGNOSIS — E55.9 VITAMIN D DEFICIENCY: ICD-10-CM

## 2017-12-13 DIAGNOSIS — I10 ESSENTIAL HYPERTENSION: Chronic | ICD-10-CM

## 2017-12-13 DIAGNOSIS — H35.52 RETINITIS PIGMENTOSA: ICD-10-CM

## 2017-12-13 DIAGNOSIS — N18.4 CKD (CHRONIC KIDNEY DISEASE), STAGE IV: ICD-10-CM

## 2017-12-13 DIAGNOSIS — E11.22 DIABETES MELLITUS WITH STAGE 4 CHRONIC KIDNEY DISEASE: ICD-10-CM

## 2017-12-13 DIAGNOSIS — Z85.3 HISTORY OF RIGHT BREAST CANCER: Chronic | ICD-10-CM

## 2017-12-13 DIAGNOSIS — N25.81 HYPERPARATHYROIDISM, SECONDARY RENAL: ICD-10-CM

## 2017-12-13 DIAGNOSIS — N18.4 CKD (CHRONIC KIDNEY DISEASE), STAGE IV: Chronic | ICD-10-CM

## 2017-12-13 DIAGNOSIS — N25.81 SECONDARY RENAL HYPERPARATHYROIDISM: Chronic | ICD-10-CM

## 2017-12-13 DIAGNOSIS — Z91.89 POTENTIAL FOR COGNITIVE IMPAIRMENT: ICD-10-CM

## 2017-12-13 DIAGNOSIS — N18.9 ANEMIA ASSOCIATED WITH CHRONIC RENAL FAILURE: Primary | ICD-10-CM

## 2017-12-13 DIAGNOSIS — E11.69 DYSLIPIDEMIA ASSOCIATED WITH TYPE 2 DIABETES MELLITUS: Chronic | ICD-10-CM

## 2017-12-13 DIAGNOSIS — Z00.00 ENCOUNTER FOR PREVENTIVE HEALTH EXAMINATION: Primary | ICD-10-CM

## 2017-12-13 PROCEDURE — 99499 UNLISTED E&M SERVICE: CPT | Mod: S$GLB,,, | Performed by: INTERNAL MEDICINE

## 2017-12-13 PROCEDURE — G0439 PPPS, SUBSEQ VISIT: HCPCS | Mod: S$GLB,,, | Performed by: INTERNAL MEDICINE

## 2017-12-13 PROCEDURE — 99999 PR PBB SHADOW E&M-EST. PATIENT-LVL III: CPT | Mod: PBBFAC,,, | Performed by: INTERNAL MEDICINE

## 2017-12-13 PROCEDURE — 63600175 PHARM REV CODE 636 W HCPCS: Performed by: INTERNAL MEDICINE

## 2017-12-13 PROCEDURE — 99214 OFFICE O/P EST MOD 30 MIN: CPT | Mod: S$GLB,,, | Performed by: INTERNAL MEDICINE

## 2017-12-13 PROCEDURE — 96372 THER/PROPH/DIAG INJ SC/IM: CPT

## 2017-12-13 RX ADMIN — DARBEPOETIN ALFA 200 MCG: 200 INJECTION, SOLUTION INTRAVENOUS; SUBCUTANEOUS at 01:12

## 2017-12-13 NOTE — PATIENT INSTRUCTIONS
Counseling and Referral of Other Preventative  (Italic type indicates deductible and co-insurance are waived)    Patient Name: Brynn Augustine  Today's Date: 12/13/2017      SERVICE LIMITATIONS RECOMMENDATION    Vaccines    · Pneumococcal (once after 65)    · Influenza (annually)    · Hepatitis B (if medium/high risk)    · Prevnar 13      Hepatitis B medium/high risk factors:       - End-stage renal disease       - Hemophiliacs who received Factor VII or         IX concentrates       - Clients of institutions for the mentally             retarded       - Persons who live in the same house as          a HepB carrier       - Homosexual men       - Illicit injectable drug abusers     Pneumococcal: Scheduled - see appointments     Influenza: Recommended to patient, declined due to media reports of only 10% effective     Hepatitis B: N/A     Prevnar 13: N/A    Mammogram (biennial age 50-74)  Annually (age 40 or over)  N/A    Pap (up to age 70 and after 70 if unknown history or abnormal study last 10 years)    N/A     The USPSTF recommends against screening for cervical cancer in women who have had a hysterectomy with removal of the cervix and who do not have a history of a high-grade precancerous lesion (cervical intraepithelial neoplasia [JAMES] grade 2 or 3) or cervical cancer.     Colorectal cancer screening (to age 75)    · Fecal occult blood test (annual)  · Flexible sigmoidoscopy (5y)  · Screening colonoscopy (10y)  · Barium enema   N/A    Diabetes self-management training (no USPSTF recommendations)  Requires referral by treating physician for patient with diabetes or renal disease. 10 hours of initial DSMT sessions of no less than 30 minutes each in a continuous 12-month period. 2 hours of follow-up DSMT in subsequent years.  Recommended to patient, declined    Bone mass measurements (age 65 & older, biennial)  Requires diagnosis related to osteoporosis or estrogen deficiency. Biennial benefit unless patient has  history of long-term glucocorticoid  Last done 3/5/2009, recommend to repeat every 5  years    Glaucoma screening (no USPSTF recommendation)  Diabetes mellitus, family history   , age 50 or over    American, age 65 or over  Last done 5/4/2016 recommend to repeat every 1 Year    Medical nutrition therapy for diabetes or renal disease (no recommended schedule)  Requires referral by treating physician for patient with diabetes or renal disease or kidney transplant within the past 3 years.  Can be provided in same year as diabetes self-management training (DSMT), and CMS recommends medical nutrition therapy take place after DSMT. Up to 3 hours for initial year and 2 hours in subsequent years.  Recommended to patient, declined    Cardiovascular screening blood tests (every 5 years)  · Fasting lipid panel  Order as a panel if possible  last checked 2/4/13 with low HDL ( good cholesterol and High triglycerides. recommend recheck    Diabetes screening tests (at least every 3 years, Medicare covers annually or at 6-month intervals for prediabetic patients)  · Fasting blood sugar (FBS) or glucose tolerance test (GTT)  Patient must be diagnosed with one of the following:       - Hypertension       - Dyslipidemia       - Obesity (BMI 30kg/m2)       - Previous elevated impaired FBS or GTT       ... or any two of the following:       - Overweight (BMI 25 but <30)       - Family history of diabetes       - Age 65 or older       - History of gestational diabetes or birth of baby weighing more than 9 pounds  Known diabetic with some hypoglycemia, off all medicationss, no longer self checks. Last HGBA1c 12/5/2016. Recommend recheck with next lab    HIV screening (annually for increased risk patients)  · HIV-1 and HIV-2 by EIA, or SUNI, rapid antibody test or oral mucosa transudate  Patients must be at increased risk for HIV infection per USPSTF guidelines or pregnant. Tests covered annually for patient at  increased risk or as requested by the patient. Pregnant patients may receive up to 3 tests during pregnancy.  Risks discussed, screening is not recommended    Smoking cessation counseling (up to 8 sessions per year)  Patients must be asymptomatic of tobacco-related conditions to receive as a preventative service.  Non-smoker    Subsequent annual wellness visit  At least 12 months since last AWV  Return in one year     The following information is provided to all patients.  This information is to help you find resources for any of the problems found today that may be affecting your health:                Living healthy guide: www.[facility].[place].[facility]      Understanding Diabetes: www.diabetes.org      Eating healthy: www.cdc.gov/healthyweight      [facility] home safety checklist: www.cdc.gov/steadi/patient.html      Agency on Aging: www.goea.[place].[facility]      Alcoholics anonymous (AA): www.aa.org      Physical Activity: www.duy.nih.gov/hd3xnvw      Tobacco use: www.quitwithusla.org
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
## 2017-12-13 NOTE — PATIENT INSTRUCTIONS
[facility]  [address]  [address] [address]  [phone] phone     [phone] fax  Hours of Operation: Monday- Friday 8:00am- 5:00pm  After hours phone  [phone]  Hematology / Oncology Physicians on call      Dr. Neo Abarca                        Please call with any concerns regarding your appointment today.  FALL PREVENTION   Falls often occur due to slipping, tripping or losing your balance. Here are ways to reduce your risk of falling again.   Was there anything that caused your fall that can be fixed, removed or replaced?   Make your home safe by keeping walkways clear of objects you may trip over.   Use non-slip pads under rugs.   Do not walk in poorly lit areas.   Do not stand on chairs or wobbly ladders.   Use caution when reaching overhead or looking upward. This position can cause a loss of balance.   Be sure your shoes fit properly, have non-slip bottoms and are in good condition.   Be cautious when going up and down stairs, curbs, and when walking on uneven sidewalks.   If your balance is poor, consider using a cane or walker.   If your fall was related to alcohol use, stop or limit alcohol intake.   If your fall was related to use of sleeping medicines, talk to your doctor about this. You may need to reduce your dosage at bedtime if you awaken during the night to go to the bathroom.   To reduce the need for nighttime bathroom trips:   Avoid drinking fluids for several hours before going to bed   Empty your bladder before going to bed   Men can keep a urinal at the bedside   © 6592-0272 [employer], [address] [place] [address], [place] [address]. All rights reserved. This information is not intended as a substitute for professional medical care. Always follow your healthcare professional's instructions.
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
## 2017-12-31 RX ORDER — ERGOCALCIFEROL 1.25 MG/1
CAPSULE ORAL
Qty: 12 CAPSULE | Refills: 0 | Status: SHIPPED | OUTPATIENT
Start: 2017-12-31 | End: 2018-02-02 | Stop reason: SDUPTHER

## 2018-02-02 RX ORDER — ERGOCALCIFEROL 1.25 MG/1
CAPSULE ORAL
Qty: 12 CAPSULE | Refills: 0 | Status: SHIPPED | OUTPATIENT
Start: 2018-02-02 | End: 2018-05-02 | Stop reason: SDUPTHER

## 2018-02-14 DIAGNOSIS — I10 ESSENTIAL HYPERTENSION: ICD-10-CM

## 2018-02-15 RX ORDER — CARVEDILOL 12.5 MG/1
TABLET ORAL
Qty: 180 TABLET | Refills: 0 | Status: SHIPPED | OUTPATIENT
Start: 2018-02-15 | End: 2018-05-30 | Stop reason: SDUPTHER

## 2018-02-23 ENCOUNTER — LAB VISIT (OUTPATIENT)
Dept: LAB | Facility: HOSPITAL | Age: 83
End: 2018-02-23
Attending: INTERNAL MEDICINE
Payer: MEDICARE

## 2018-02-23 DIAGNOSIS — M10.30 GOUT DUE TO RENAL IMPAIRMENT, UNSPECIFIED CHRONICITY, UNSPECIFIED SITE: ICD-10-CM

## 2018-02-23 DIAGNOSIS — D63.1 ANEMIA ASSOCIATED WITH CHRONIC RENAL FAILURE: ICD-10-CM

## 2018-02-23 DIAGNOSIS — I10 ESSENTIAL HYPERTENSION: ICD-10-CM

## 2018-02-23 DIAGNOSIS — N18.4 DIABETES MELLITUS WITH STAGE 4 CHRONIC KIDNEY DISEASE: ICD-10-CM

## 2018-02-23 DIAGNOSIS — N18.4 CKD (CHRONIC KIDNEY DISEASE), STAGE IV: ICD-10-CM

## 2018-02-23 DIAGNOSIS — N25.81 HYPERPARATHYROIDISM, SECONDARY RENAL: ICD-10-CM

## 2018-02-23 DIAGNOSIS — I73.9 PERIPHERAL VASCULAR DISEASE: ICD-10-CM

## 2018-02-23 DIAGNOSIS — E11.22 DIABETES MELLITUS WITH STAGE 4 CHRONIC KIDNEY DISEASE: ICD-10-CM

## 2018-02-23 DIAGNOSIS — N18.9 ANEMIA ASSOCIATED WITH CHRONIC RENAL FAILURE: ICD-10-CM

## 2018-02-23 DIAGNOSIS — E55.9 VITAMIN D DEFICIENCY: ICD-10-CM

## 2018-02-23 LAB
25(OH)D3+25(OH)D2 SERPL-MCNC: 35 NG/ML
ALBUMIN SERPL BCP-MCNC: 3.3 G/DL
ANION GAP SERPL CALC-SCNC: 9 MMOL/L
BASOPHILS # BLD AUTO: 0.06 K/UL
BASOPHILS NFR BLD: 0.7 %
BUN SERPL-MCNC: 38 MG/DL
CALCIUM SERPL-MCNC: 9 MG/DL
CHLORIDE SERPL-SCNC: 113 MMOL/L
CO2 SERPL-SCNC: 21 MMOL/L
CREAT SERPL-MCNC: 1.8 MG/DL
DIFFERENTIAL METHOD: ABNORMAL
EOSINOPHIL # BLD AUTO: 0.3 K/UL
EOSINOPHIL NFR BLD: 3.5 %
ERYTHROCYTE [DISTWIDTH] IN BLOOD BY AUTOMATED COUNT: 16.5 %
EST. GFR  (AFRICAN AMERICAN): 28.8 ML/MIN/1.73 M^2
EST. GFR  (NON AFRICAN AMERICAN): 24.9 ML/MIN/1.73 M^2
GLUCOSE SERPL-MCNC: 130 MG/DL
HCT VFR BLD AUTO: 24.5 %
HGB BLD-MCNC: 7.7 G/DL
IMM GRANULOCYTES # BLD AUTO: 0.02 K/UL
IMM GRANULOCYTES NFR BLD AUTO: 0.2 %
IRON SERPL-MCNC: 87 UG/DL
LYMPHOCYTES # BLD AUTO: 2.4 K/UL
LYMPHOCYTES NFR BLD: 25.6 %
MCH RBC QN AUTO: 34.1 PG
MCHC RBC AUTO-ENTMCNC: 31.4 G/DL
MCV RBC AUTO: 108 FL
MONOCYTES # BLD AUTO: 0.7 K/UL
MONOCYTES NFR BLD: 7.1 %
NEUTROPHILS # BLD AUTO: 5.8 K/UL
NEUTROPHILS NFR BLD: 62.9 %
NRBC BLD-RTO: 0 /100 WBC
PHOSPHATE SERPL-MCNC: 3.8 MG/DL
PLATELET # BLD AUTO: 267 K/UL
PMV BLD AUTO: 10.9 FL
POTASSIUM SERPL-SCNC: 4.6 MMOL/L
PTH-INTACT SERPL-MCNC: 143 PG/ML
RBC # BLD AUTO: 2.26 M/UL
SATURATED IRON: 34 %
SODIUM SERPL-SCNC: 143 MMOL/L
TOTAL IRON BINDING CAPACITY: 258 UG/DL
TRANSFERRIN SERPL-MCNC: 174 MG/DL
WBC # BLD AUTO: 9.21 K/UL

## 2018-02-23 PROCEDURE — 80069 RENAL FUNCTION PANEL: CPT

## 2018-02-23 PROCEDURE — 83540 ASSAY OF IRON: CPT

## 2018-02-23 PROCEDURE — 83970 ASSAY OF PARATHORMONE: CPT

## 2018-02-23 PROCEDURE — 36415 COLL VENOUS BLD VENIPUNCTURE: CPT

## 2018-02-23 PROCEDURE — 85025 COMPLETE CBC W/AUTO DIFF WBC: CPT

## 2018-02-23 PROCEDURE — 82610 CYSTATIN C: CPT

## 2018-02-23 PROCEDURE — 82306 VITAMIN D 25 HYDROXY: CPT

## 2018-02-26 LAB
CYSTATIN C SERPL-MCNC: 2.44 MG/L
GFR/BSA.PRED SERPLBLD CYS-BASED-ARV: 20 ML/MIN/BSA

## 2018-03-01 ENCOUNTER — OFFICE VISIT (OUTPATIENT)
Dept: NEPHROLOGY | Facility: CLINIC | Age: 83
End: 2018-03-01
Payer: MEDICARE

## 2018-03-01 ENCOUNTER — INFUSION (OUTPATIENT)
Dept: INFUSION THERAPY | Facility: HOSPITAL | Age: 83
End: 2018-03-01
Attending: INTERNAL MEDICINE
Payer: MEDICARE

## 2018-03-01 VITALS
WEIGHT: 129.88 LBS | SYSTOLIC BLOOD PRESSURE: 128 MMHG | WEIGHT: 129.88 LBS | HEART RATE: 73 BPM | HEIGHT: 62 IN | DIASTOLIC BLOOD PRESSURE: 60 MMHG | BODY MASS INDEX: 23.9 KG/M2 | BODY MASS INDEX: 23.9 KG/M2 | HEIGHT: 62 IN

## 2018-03-01 DIAGNOSIS — N18.9 ANEMIA ASSOCIATED WITH CHRONIC RENAL FAILURE: Primary | ICD-10-CM

## 2018-03-01 DIAGNOSIS — E55.9 VITAMIN D DEFICIENCY: ICD-10-CM

## 2018-03-01 DIAGNOSIS — N18.4 CKD (CHRONIC KIDNEY DISEASE), STAGE IV: ICD-10-CM

## 2018-03-01 DIAGNOSIS — N18.9 ANEMIA ASSOCIATED WITH CHRONIC RENAL FAILURE: ICD-10-CM

## 2018-03-01 DIAGNOSIS — N18.4 CKD (CHRONIC KIDNEY DISEASE), STAGE IV: Primary | ICD-10-CM

## 2018-03-01 DIAGNOSIS — D63.1 ANEMIA ASSOCIATED WITH CHRONIC RENAL FAILURE: Primary | ICD-10-CM

## 2018-03-01 DIAGNOSIS — I10 ESSENTIAL HYPERTENSION: ICD-10-CM

## 2018-03-01 DIAGNOSIS — N25.81 HYPERPARATHYROIDISM, SECONDARY RENAL: ICD-10-CM

## 2018-03-01 DIAGNOSIS — D63.1 ANEMIA ASSOCIATED WITH CHRONIC RENAL FAILURE: ICD-10-CM

## 2018-03-01 PROCEDURE — 99999 PR PBB SHADOW E&M-EST. PATIENT-LVL III: CPT | Mod: PBBFAC,,, | Performed by: INTERNAL MEDICINE

## 2018-03-01 PROCEDURE — 99214 OFFICE O/P EST MOD 30 MIN: CPT | Mod: S$GLB,,, | Performed by: INTERNAL MEDICINE

## 2018-03-01 PROCEDURE — 99499 UNLISTED E&M SERVICE: CPT | Mod: S$GLB,,, | Performed by: INTERNAL MEDICINE

## 2018-03-01 PROCEDURE — 63600175 PHARM REV CODE 636 W HCPCS: Mod: EC,JG | Performed by: INTERNAL MEDICINE

## 2018-03-01 PROCEDURE — 96372 THER/PROPH/DIAG INJ SC/IM: CPT

## 2018-03-01 RX ADMIN — DARBEPOETIN ALFA 200 MCG: 200 INJECTION, SOLUTION INTRAVENOUS; SUBCUTANEOUS at 11:03
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
## 2018-03-01 NOTE — PATIENT INSTRUCTIONS
1. CKD- stage IV: stable in last 12 months 20 %     2. Anemia: refused GI evaluation ( refused work up) ; redose aranesp at 200 mcg q 2 months but patient's last dose was in 12/2017     3. Hypertension:controlled: better on norvasc 5 mg daily     4. Hyperparathyroidism:stable PTH ;keep Ergo weekly    5.DMII: controlled ,taking bed time snacks         Followup 3  month

## 2018-03-09 RX ORDER — AMLODIPINE BESYLATE 5 MG/1
5 TABLET ORAL DAILY
Qty: 30 TABLET | Refills: 11 | Status: ON HOLD | OUTPATIENT
Start: 2018-03-09 | End: 2018-06-20 | Stop reason: HOSPADM

## 2018-05-01 ENCOUNTER — PATIENT OUTREACH (OUTPATIENT)
Dept: ADMINISTRATIVE | Facility: HOSPITAL | Age: 83
End: 2018-05-01

## 2018-05-01 NOTE — PROGRESS NOTES
Patient's eye examination not schedule yet. Patient will call me after appointment to retrieve the record. Patient in agreement and vocalize understanding.

## 2018-05-02 RX ORDER — ERGOCALCIFEROL 1.25 MG/1
CAPSULE ORAL
Qty: 12 CAPSULE | Refills: 0 | Status: SHIPPED | OUTPATIENT
Start: 2018-05-02

## 2018-05-16 DIAGNOSIS — I10 ESSENTIAL HYPERTENSION: ICD-10-CM

## 2018-05-16 RX ORDER — CARVEDILOL 12.5 MG/1
TABLET ORAL
Qty: 180 TABLET | Refills: 0 | OUTPATIENT
Start: 2018-05-16

## 2018-05-30 ENCOUNTER — TELEPHONE (OUTPATIENT)
Dept: NEPHROLOGY | Facility: CLINIC | Age: 83
End: 2018-05-30

## 2018-05-30 DIAGNOSIS — I10 ESSENTIAL HYPERTENSION: ICD-10-CM

## 2018-05-30 RX ORDER — CARVEDILOL 12.5 MG/1
TABLET ORAL
Qty: 180 TABLET | Refills: 0 | OUTPATIENT
Start: 2018-05-30

## 2018-05-30 RX ORDER — CARVEDILOL 12.5 MG/1
TABLET ORAL
Qty: 180 TABLET | Refills: 0 | Status: ON HOLD | OUTPATIENT
Start: 2018-05-30 | End: 2018-08-25 | Stop reason: SDUPTHER

## 2018-05-30 NOTE — TELEPHONE ENCOUNTER
----- Message from Sneha Kelley sent at 5/30/2018  2:20 PM CDT -----  Contact: pt son- Tushar  Tushar state the patient pharmacy requested a Rx refill for Carvedilol, but have not heard back from anyone. Tushar is calling to check the status.    Pt use..  [facility] [address] [place] [address] [place], [place] - [id number] [place] [address] AT SEC of [address]  [address] [facility]  [place]AH[place] SNYDER [id number]  Phone: [phone] Fax: [phone]    Please adv/call [phone].//cw

## 2018-05-30 NOTE — TELEPHONE ENCOUNTER
Returned call to patient's son, Ramy. Informed him that the medication has been sent over already. He expressed understanding.

## 2018-06-11 ENCOUNTER — LAB VISIT (OUTPATIENT)
Dept: LAB | Facility: HOSPITAL | Age: 83
End: 2018-06-11
Attending: INTERNAL MEDICINE
Payer: MEDICARE

## 2018-06-11 DIAGNOSIS — N25.81 HYPERPARATHYROIDISM, SECONDARY RENAL: ICD-10-CM

## 2018-06-11 DIAGNOSIS — E55.9 VITAMIN D DEFICIENCY: ICD-10-CM

## 2018-06-11 DIAGNOSIS — I10 ESSENTIAL HYPERTENSION: ICD-10-CM

## 2018-06-11 DIAGNOSIS — N18.4 CKD (CHRONIC KIDNEY DISEASE), STAGE IV: ICD-10-CM

## 2018-06-11 DIAGNOSIS — D63.1 ANEMIA ASSOCIATED WITH CHRONIC RENAL FAILURE: ICD-10-CM

## 2018-06-11 DIAGNOSIS — N18.9 ANEMIA ASSOCIATED WITH CHRONIC RENAL FAILURE: ICD-10-CM

## 2018-06-11 LAB
ALBUMIN SERPL BCP-MCNC: 3.5 G/DL
ANION GAP SERPL CALC-SCNC: 8 MMOL/L
BASOPHILS # BLD AUTO: 0.05 K/UL
BASOPHILS NFR BLD: 0.7 %
BUN SERPL-MCNC: 26 MG/DL
CALCIUM SERPL-MCNC: 8.8 MG/DL
CHLORIDE SERPL-SCNC: 109 MMOL/L
CO2 SERPL-SCNC: 28 MMOL/L
CREAT SERPL-MCNC: 1.5 MG/DL
DIFFERENTIAL METHOD: ABNORMAL
EOSINOPHIL # BLD AUTO: 0.4 K/UL
EOSINOPHIL NFR BLD: 5.6 %
ERYTHROCYTE [DISTWIDTH] IN BLOOD BY AUTOMATED COUNT: 18 %
EST. GFR  (AFRICAN AMERICAN): 35.9 ML/MIN/1.73 M^2
EST. GFR  (NON AFRICAN AMERICAN): 31.1 ML/MIN/1.73 M^2
GLUCOSE SERPL-MCNC: 141 MG/DL
HCT VFR BLD AUTO: 26.4 %
HGB BLD-MCNC: 8 G/DL
IMM GRANULOCYTES # BLD AUTO: 0.04 K/UL
IMM GRANULOCYTES NFR BLD AUTO: 0.6 %
LYMPHOCYTES # BLD AUTO: 2.6 K/UL
LYMPHOCYTES NFR BLD: 36.1 %
MCH RBC QN AUTO: 32.3 PG
MCHC RBC AUTO-ENTMCNC: 30.3 G/DL
MCV RBC AUTO: 107 FL
MONOCYTES # BLD AUTO: 0.5 K/UL
MONOCYTES NFR BLD: 7.5 %
NEUTROPHILS # BLD AUTO: 3.6 K/UL
NEUTROPHILS NFR BLD: 49.5 %
NRBC BLD-RTO: 0 /100 WBC
PHOSPHATE SERPL-MCNC: 3.9 MG/DL
PLATELET # BLD AUTO: 239 K/UL
PMV BLD AUTO: 11.4 FL
POTASSIUM SERPL-SCNC: 3.3 MMOL/L
RBC # BLD AUTO: 2.48 M/UL
SODIUM SERPL-SCNC: 145 MMOL/L
WBC # BLD AUTO: 7.2 K/UL

## 2018-06-11 PROCEDURE — 85025 COMPLETE CBC W/AUTO DIFF WBC: CPT

## 2018-06-11 PROCEDURE — 36415 COLL VENOUS BLD VENIPUNCTURE: CPT

## 2018-06-11 PROCEDURE — 80069 RENAL FUNCTION PANEL: CPT

## 2018-06-12 ENCOUNTER — LAB VISIT (OUTPATIENT)
Dept: LAB | Facility: HOSPITAL | Age: 83
End: 2018-06-12
Attending: INTERNAL MEDICINE
Payer: MEDICARE

## 2018-06-12 DIAGNOSIS — R82.90 ABNORMAL URINALYSIS: Primary | ICD-10-CM

## 2018-06-12 DIAGNOSIS — R82.90 ABNORMAL URINALYSIS: ICD-10-CM

## 2018-06-12 PROCEDURE — 87086 URINE CULTURE/COLONY COUNT: CPT

## 2018-06-13 LAB
BACTERIA UR CULT: NORMAL
BACTERIA UR CULT: NORMAL

## 2018-06-14 ENCOUNTER — HOSPITAL ENCOUNTER (INPATIENT)
Facility: HOSPITAL | Age: 83
LOS: 6 days | Discharge: NURSING, REHAB OR LONG-TERM CARE | DRG: 312 | End: 2018-06-20
Attending: EMERGENCY MEDICINE | Admitting: INTERNAL MEDICINE
Payer: MEDICARE

## 2018-06-14 DIAGNOSIS — I16.0 HYPERTENSIVE URGENCY: ICD-10-CM

## 2018-06-14 DIAGNOSIS — R53.1 WEAKNESS: Primary | ICD-10-CM

## 2018-06-14 DIAGNOSIS — R55 NEAR SYNCOPE: ICD-10-CM

## 2018-06-14 DIAGNOSIS — R55 SYNCOPE: ICD-10-CM

## 2018-06-14 PROBLEM — E11.9 DM II (DIABETES MELLITUS, TYPE II), CONTROLLED: Status: ACTIVE | Noted: 2018-06-14

## 2018-06-14 PROBLEM — E83.42 HYPOMAGNESEMIA: Status: ACTIVE | Noted: 2018-06-14

## 2018-06-14 PROBLEM — E87.6 HYPOKALEMIA: Status: ACTIVE | Noted: 2018-06-14

## 2018-06-14 PROBLEM — R74.8 ELEVATED CPK: Status: ACTIVE | Noted: 2018-06-14

## 2018-06-14 PROBLEM — G93.40 ENCEPHALOPATHY: Status: ACTIVE | Noted: 2018-06-14

## 2018-06-14 LAB
ALBUMIN SERPL BCP-MCNC: 3.6 G/DL
ALP SERPL-CCNC: 74 U/L
ALT SERPL W/O P-5'-P-CCNC: 6 U/L
AMMONIA PLAS-SCNC: 25 UMOL/L
ANION GAP SERPL CALC-SCNC: 11 MMOL/L
APTT BLDCRRT: 27 SEC
AST SERPL-CCNC: 21 U/L
BACTERIA #/AREA URNS HPF: NORMAL /HPF
BASOPHILS # BLD AUTO: 0.01 K/UL
BASOPHILS NFR BLD: 0.1 %
BILIRUB SERPL-MCNC: 1.5 MG/DL
BILIRUB UR QL STRIP: NEGATIVE
BNP SERPL-MCNC: 1313 PG/ML
BUN SERPL-MCNC: 25 MG/DL
CALCIUM SERPL-MCNC: 8.8 MG/DL
CHLORIDE SERPL-SCNC: 104 MMOL/L
CK MB SERPL-MCNC: 22 NG/ML
CK MB SERPL-RTO: 5.3 %
CK SERPL-CCNC: 419 U/L
CK SERPL-CCNC: 419 U/L
CLARITY UR: ABNORMAL
CO2 SERPL-SCNC: 26 MMOL/L
COLOR UR: YELLOW
CREAT SERPL-MCNC: 1.4 MG/DL
DIFFERENTIAL METHOD: ABNORMAL
EOSINOPHIL # BLD AUTO: 0 K/UL
EOSINOPHIL NFR BLD: 0.1 %
ERYTHROCYTE [DISTWIDTH] IN BLOOD BY AUTOMATED COUNT: 18.1 %
EST. GFR  (AFRICAN AMERICAN): 39 ML/MIN/1.73 M^2
EST. GFR  (NON AFRICAN AMERICAN): 34 ML/MIN/1.73 M^2
GLUCOSE SERPL-MCNC: 171 MG/DL
GLUCOSE UR QL STRIP: NEGATIVE
HCT VFR BLD AUTO: 29.3 %
HGB BLD-MCNC: 9.5 G/DL
HGB UR QL STRIP: ABNORMAL
HYALINE CASTS #/AREA URNS LPF: 0 /LPF
INR PPP: 1.2
KETONES UR QL STRIP: NEGATIVE
LEUKOCYTE ESTERASE UR QL STRIP: NEGATIVE
LYMPHOCYTES # BLD AUTO: 1.5 K/UL
LYMPHOCYTES NFR BLD: 10.7 %
MAGNESIUM SERPL-MCNC: 1.5 MG/DL
MCH RBC QN AUTO: 32.1 PG
MCHC RBC AUTO-ENTMCNC: 32.4 G/DL
MCV RBC AUTO: 99 FL
MICROSCOPIC COMMENT: NORMAL
MONOCYTES # BLD AUTO: 1.1 K/UL
MONOCYTES NFR BLD: 7.7 %
NEUTROPHILS # BLD AUTO: 11.6 K/UL
NEUTROPHILS NFR BLD: 81.4 %
NITRITE UR QL STRIP: NEGATIVE
PH UR STRIP: 7 [PH] (ref 5–8)
PHOSPHATE SERPL-MCNC: 3.3 MG/DL
PLATELET # BLD AUTO: 259 K/UL
PMV BLD AUTO: 11.1 FL
POCT GLUCOSE: 129 MG/DL (ref 70–110)
POTASSIUM SERPL-SCNC: 3.2 MMOL/L
PROT SERPL-MCNC: 7.3 G/DL
PROT UR QL STRIP: ABNORMAL
PROTHROMBIN TIME: 12.1 SEC
RBC # BLD AUTO: 2.96 M/UL
RBC #/AREA URNS HPF: 4 /HPF (ref 0–4)
SODIUM SERPL-SCNC: 141 MMOL/L
SP GR UR STRIP: 1.01 (ref 1–1.03)
SQUAMOUS #/AREA URNS HPF: 1 /HPF
TROPONIN I SERPL DL<=0.01 NG/ML-MCNC: 0.02 NG/ML
TSH SERPL DL<=0.005 MIU/L-ACNC: 3.84 UIU/ML
URN SPEC COLLECT METH UR: ABNORMAL
UROBILINOGEN UR STRIP-ACNC: NEGATIVE EU/DL
WBC # BLD AUTO: 14.27 K/UL
WBC #/AREA URNS HPF: 1 /HPF (ref 0–5)

## 2018-06-14 PROCEDURE — 25000003 PHARM REV CODE 250: Performed by: PHYSICIAN ASSISTANT

## 2018-06-14 PROCEDURE — 82550 ASSAY OF CK (CPK): CPT

## 2018-06-14 PROCEDURE — 99285 EMERGENCY DEPT VISIT HI MDM: CPT | Mod: 25

## 2018-06-14 PROCEDURE — 96374 THER/PROPH/DIAG INJ IV PUSH: CPT

## 2018-06-14 PROCEDURE — 85730 THROMBOPLASTIN TIME PARTIAL: CPT

## 2018-06-14 PROCEDURE — 85025 COMPLETE CBC W/AUTO DIFF WBC: CPT

## 2018-06-14 PROCEDURE — 84484 ASSAY OF TROPONIN QUANT: CPT

## 2018-06-14 PROCEDURE — 96361 HYDRATE IV INFUSION ADD-ON: CPT

## 2018-06-14 PROCEDURE — 93005 ELECTROCARDIOGRAM TRACING: CPT

## 2018-06-14 PROCEDURE — 82607 VITAMIN B-12: CPT

## 2018-06-14 PROCEDURE — 86592 SYPHILIS TEST NON-TREP QUAL: CPT

## 2018-06-14 PROCEDURE — 84100 ASSAY OF PHOSPHORUS: CPT

## 2018-06-14 PROCEDURE — G0378 HOSPITAL OBSERVATION PER HR: HCPCS

## 2018-06-14 PROCEDURE — 83735 ASSAY OF MAGNESIUM: CPT

## 2018-06-14 PROCEDURE — 63600175 PHARM REV CODE 636 W HCPCS: Performed by: PHYSICIAN ASSISTANT

## 2018-06-14 PROCEDURE — 85610 PROTHROMBIN TIME: CPT

## 2018-06-14 PROCEDURE — 83880 ASSAY OF NATRIURETIC PEPTIDE: CPT

## 2018-06-14 PROCEDURE — 84443 ASSAY THYROID STIM HORMONE: CPT

## 2018-06-14 PROCEDURE — 36415 COLL VENOUS BLD VENIPUNCTURE: CPT

## 2018-06-14 PROCEDURE — 25000003 PHARM REV CODE 250: Performed by: EMERGENCY MEDICINE

## 2018-06-14 PROCEDURE — 94761 N-INVAS EAR/PLS OXIMETRY MLT: CPT

## 2018-06-14 PROCEDURE — 82746 ASSAY OF FOLIC ACID SERUM: CPT

## 2018-06-14 PROCEDURE — 82553 CREATINE MB FRACTION: CPT

## 2018-06-14 PROCEDURE — 93010 ELECTROCARDIOGRAM REPORT: CPT | Mod: ,,, | Performed by: INTERNAL MEDICINE

## 2018-06-14 PROCEDURE — 80053 COMPREHEN METABOLIC PANEL: CPT

## 2018-06-14 PROCEDURE — 82140 ASSAY OF AMMONIA: CPT

## 2018-06-14 PROCEDURE — 83036 HEMOGLOBIN GLYCOSYLATED A1C: CPT

## 2018-06-14 PROCEDURE — 81000 URINALYSIS NONAUTO W/SCOPE: CPT

## 2018-06-14 PROCEDURE — 21400001 HC TELEMETRY ROOM

## 2018-06-14 RX ORDER — FUROSEMIDE 40 MG/1
40 TABLET ORAL DAILY
Status: DISCONTINUED | OUTPATIENT
Start: 2018-06-15 | End: 2018-06-15

## 2018-06-14 RX ORDER — LANOLIN ALCOHOL/MO/W.PET/CERES
400 CREAM (GRAM) TOPICAL 2 TIMES DAILY
Status: DISCONTINUED | OUTPATIENT
Start: 2018-06-14 | End: 2018-06-20 | Stop reason: HOSPADM

## 2018-06-14 RX ORDER — GLUCAGON 1 MG
1 KIT INJECTION
Status: DISCONTINUED | OUTPATIENT
Start: 2018-06-14 | End: 2018-06-20 | Stop reason: HOSPADM

## 2018-06-14 RX ORDER — AMLODIPINE BESYLATE 5 MG/1
5 TABLET ORAL DAILY
Status: DISCONTINUED | OUTPATIENT
Start: 2018-06-15 | End: 2018-06-15

## 2018-06-14 RX ORDER — SODIUM CHLORIDE 9 MG/ML
INJECTION, SOLUTION INTRAVENOUS CONTINUOUS
Status: DISCONTINUED | OUTPATIENT
Start: 2018-06-14 | End: 2018-06-14

## 2018-06-14 RX ORDER — IBUPROFEN 200 MG
16 TABLET ORAL
Status: DISCONTINUED | OUTPATIENT
Start: 2018-06-14 | End: 2018-06-20 | Stop reason: HOSPADM

## 2018-06-14 RX ORDER — HYDRALAZINE HYDROCHLORIDE 20 MG/ML
10 INJECTION INTRAMUSCULAR; INTRAVENOUS EVERY 6 HOURS PRN
Status: DISCONTINUED | OUTPATIENT
Start: 2018-06-14 | End: 2018-06-20 | Stop reason: HOSPADM

## 2018-06-14 RX ORDER — CARVEDILOL 12.5 MG/1
12.5 TABLET ORAL 2 TIMES DAILY
Status: DISCONTINUED | OUTPATIENT
Start: 2018-06-14 | End: 2018-06-15

## 2018-06-14 RX ORDER — IBUPROFEN 200 MG
24 TABLET ORAL
Status: DISCONTINUED | OUTPATIENT
Start: 2018-06-14 | End: 2018-06-20 | Stop reason: HOSPADM

## 2018-06-14 RX ORDER — HYDRALAZINE HYDROCHLORIDE 20 MG/ML
10 INJECTION INTRAMUSCULAR; INTRAVENOUS ONCE
Status: COMPLETED | OUTPATIENT
Start: 2018-06-14 | End: 2018-06-14

## 2018-06-14 RX ORDER — ENOXAPARIN SODIUM 100 MG/ML
40 INJECTION SUBCUTANEOUS EVERY 24 HOURS
Status: DISCONTINUED | OUTPATIENT
Start: 2018-06-14 | End: 2018-06-14

## 2018-06-14 RX ORDER — ALLOPURINOL 100 MG/1
200 TABLET ORAL DAILY
Status: DISCONTINUED | OUTPATIENT
Start: 2018-06-15 | End: 2018-06-20 | Stop reason: HOSPADM

## 2018-06-14 RX ORDER — POTASSIUM CHLORIDE 20 MEQ/1
40 TABLET, EXTENDED RELEASE ORAL ONCE
Status: COMPLETED | OUTPATIENT
Start: 2018-06-14 | End: 2018-06-14

## 2018-06-14 RX ORDER — INSULIN ASPART 100 [IU]/ML
0-5 INJECTION, SOLUTION INTRAVENOUS; SUBCUTANEOUS
Status: DISCONTINUED | OUTPATIENT
Start: 2018-06-14 | End: 2018-06-20 | Stop reason: HOSPADM

## 2018-06-14 RX ORDER — HEPARIN SODIUM 5000 [USP'U]/ML
5000 INJECTION, SOLUTION INTRAVENOUS; SUBCUTANEOUS EVERY 8 HOURS
Status: DISCONTINUED | OUTPATIENT
Start: 2018-06-14 | End: 2018-06-20 | Stop reason: HOSPADM

## 2018-06-14 RX ADMIN — POTASSIUM CHLORIDE 40 MEQ: 1500 TABLET, EXTENDED RELEASE ORAL at 03:06

## 2018-06-14 RX ADMIN — SODIUM CHLORIDE 1000 ML: 0.9 INJECTION, SOLUTION INTRAVENOUS at 12:06

## 2018-06-14 RX ADMIN — MAGNESIUM OXIDE TAB 400 MG (241.3 MG ELEMENTAL MG) 400 MG: 400 (241.3 MG) TAB at 03:06

## 2018-06-14 RX ADMIN — CARVEDILOL 12.5 MG: 12.5 TABLET, FILM COATED ORAL at 09:06

## 2018-06-14 RX ADMIN — HEPARIN SODIUM 5000 UNITS: 5000 INJECTION, SOLUTION INTRAVENOUS; SUBCUTANEOUS at 09:06

## 2018-06-14 RX ADMIN — HYDRALAZINE HYDROCHLORIDE 10 MG: 20 INJECTION INTRAMUSCULAR; INTRAVENOUS at 03:06

## 2018-06-14 NOTE — HPI
Brynn Augustine is an 87 year old female with diabetes mellitus type II, hypertension and stage IV chronic kidney disease who presents to the ED with reports of a fall at home. The patient's son reports that he found her laying in her bath tub this morning at 7 AM. He suspects she fell into the tub while attempting to use the restroom during the night. The patient reports no complaints other than feeling fatigued. She denies pain. She is at her normal baseline health and does a history of chronic vision loss. She is oriented to person and place but does not time. At the time of exam, she is unable to recall common details such as her birthday and who is president. However, her son reports that she was able to recall these details earlier in the day. He also reports that she does not have a history of memory issues or a diagnosis of dementia. She lives alone, but he checks on her daily. In the ED, the patient had an increased CPK of 419, CPK MB of 22.0, and BNP of 1,313. Code status was discussed. She is a DNR. Her three children are her surrogate medical decision makers.

## 2018-06-14 NOTE — ED NOTES
Dr. Kathy reddy to discuss pt's continued rising bp's.  Hospital medicine to review case and evaluate pt.

## 2018-06-14 NOTE — ED PROVIDER NOTES
"SCRIBE #1 NOTE: I, Erica Lema, am scribing for, and in the presence of, Tiago Grant MD. I have scribed the entire note.      History      Chief Complaint   Patient presents with    Fatigue     could not get out of the bathtub for 3 hours       Review of patient's allergies indicates:   Allergen Reactions    Ace inhibitors      Caused hyperkalemia    Arb-angiotensin receptor antagonist Other (See Comments)     hyperkalemia    Codeine Nausea Only    Morphine Nausea Only        HPI   HPI    6/14/2018, 11:18 AM   History obtained from the [facility] and relatives      History of Present Illness: Brynn Augustine is a 87 y.o. female patient w/ dementia presents to the Emergency Department for fatigue which onset gradually this AM. Per [facility], "pt's family reports finding pt in the bathtub at 7 am today. Family is unaware of how long pt was in bathtub from the previous night. Pt had a near-syncopal episode whenever the family tried to get pt out of the tub". Pt's relatives state that pt is increasingly weak and confused. Symptoms are constant and moderate in severity. No mitigating or exacerbating factors reported. Patient denies any head injury, myalgias, headache, dizziness, lightheadedness, fever, chills, CP, SOB, and all other sxs at this time. No prior Tx included. No further complaints or concerns at this time.         Arrival mode:  [facility]    PCP: Gabby Carrera MD       Past Medical History:  Past Medical History:   Diagnosis Date    Anemia     Arthritis     Back pain     Breast cancer 2008    right    Diabetes with neurologic complications     Glaucoma     Gout     Hyperlipidemia     Hypertension     Hypoglycemia 12/6/2014    Kidney disease, chronic, stage IV (GFR 15-29 ml/min)     Renal manifestation of secondary diabetes mellitus     Retinitis pigmentosa 05/04/2016    outside eye exam (No BDR)    Skin cancer     several       Past Surgical History:  Past Surgical History:   Procedure " Laterality Date    APPENDECTOMY  1964    incidental with choly    BREAST SURGERY Right 08/13/2008    excisional Bx =infiltrating ductal ca, low grade ;calcifications identified within benign and malignant ducts.    BREAST SURGERY Right 09/09/2008    sentinal node and lymoh node dissection    CHOLECYSTECTOMY  1964    EYE SURGERY Bilateral 2006    IOL ou ( 2006) and YAGOD    HYSTERECTOMY  1971    Moh's  procedure Left 04/25/2011    BCC, left chin; BSC on Lt upper lip, BCC lt side of nose (dR FELIZ)    SPINE SURGERY  1975    Lumbosacral disc         Family History:  Family History   Problem Relation Age of Onset    Diabetes Unknown     Cancer Mother         lung    Cancer Father         lung    Heart disease Brother         MI    Heart disease Brother         MI    Stroke Brother     Diabetes Sister     Mental illness Neg Hx     Mental retardation Neg Hx     Hypertension Neg Hx     Drug abuse Neg Hx     Alcohol abuse Neg Hx     COPD Neg Hx     Asthma Neg Hx        Social History:  Social History     Social History Main Topics    Smoking status: Never Smoker    Smokeless tobacco: Never Used    Alcohol use No    Drug use: No    Sexual activity: No       ROS   Review of Systems   Constitutional: Positive for fatigue. Negative for fever.        (-) head injury   HENT: Negative for congestion and sore throat.    Respiratory: Negative for cough and shortness of breath.    Cardiovascular: Negative for chest pain.   Gastrointestinal: Negative for abdominal pain, nausea and vomiting.   Genitourinary: Negative for dysuria.   Musculoskeletal: Negative for back pain and myalgias.   Skin: Negative for rash.   Neurological: Positive for syncope (near-syncopal) and weakness. Negative for dizziness, light-headedness and headaches.   Hematological: Does not bruise/bleed easily.   Psychiatric/Behavioral: Positive for confusion.       Physical Exam      Initial Vitals   BP Pulse Resp Temp SpO2   06/14/18 1053  06/14/18 1053 06/14/18 1055 06/14/18 1055 06/14/18 1053   (!) 144/92 83 20 98.3 °F (36.8 °C) 100 %      MAP       --                 Physical Exam  Nursing Notes and Vital Signs Reviewed.  Constitutional: Patient is in no acute distress. Elderly.  Head: Atraumatic. Normocephalic.  Eyes: PERRL. EOM intact. Conjunctivae are not pale. No scleral icterus.  ENT: Mucous membranes are moist. Oropharynx is clear and symmetric.    Neck: Supple. Full ROM. No lymphadenopathy.  Cardiovascular: Regular rate. Regular rhythm. No murmurs, rubs, or gallops.   Pulmonary/Chest: No respiratory distress. Clear to auscultation bilaterally. No wheezing or rales.  Abdominal: Soft and non-distended.  There is no tenderness.    Musculoskeletal: Moves all extremities. No obvious deformities.   Skin: Warm and dry.  Neurological:  Alert, awake, and appropriate. Oriented to person and place, but not oriented to time. Normal speech.  No acute focal neurological deficits are appreciated.  Psychiatric: Confused. Good eye contact. Appropriate in content.      ED Course    Procedures  ED Vital Signs:  Vitals:    06/19/18 1535 06/19/18 1643 06/19/18 1707 06/19/18 1740   BP: (!) 146/64 (!) 151/70  (!) 153/71   Pulse: 76 78 77 85   Resp: 16 18 18   Temp: 98.4 °F (36.9 °C) 98.6 °F (37 °C)  99.1 °F (37.3 °C)   TempSrc: Oral Oral  Oral   SpO2: 98% 97%  96%   Weight:       Height:        06/19/18 1832 06/19/18 2002 06/19/18 2341 06/20/18 0730   BP: (!) 162/72 (!) 147/67 (!) 161/63    Pulse: 80 79 84 70   Resp: 18 17 16    Temp: 98.7 °F (37.1 °C) 98.9 °F (37.2 °C) 99 °F (37.2 °C)    TempSrc: Oral Oral Oral    SpO2: (!) 94% 96% 96%    Weight:       Height:        06/20/18 0804 06/20/18 0930 06/20/18 1004 06/20/18 1130   BP: (!) 176/77  (!) 128/58    Pulse: 75 70 81 80   Resp: 16      Temp: 96.3 °F (35.7 °C)      TempSrc: Oral      SpO2: 98%  99%    Weight:       Height:        06/20/18 1135 06/20/18 1138 06/20/18 1140   BP: (!) 152/67 (!) 151/67 (!) 155/70    Pulse: 73 75 84   Resp:   16   Temp:   97.2 °F (36.2 °C)   TempSrc:   Oral   SpO2:   98%   Weight:      Height:          Abnormal Lab Results:  Labs Reviewed   CBC W/ AUTO DIFFERENTIAL - Abnormal; Notable for the following:        Result Value    WBC 14.27 (*)     RBC 2.96 (*)     Hemoglobin 9.5 (*)     Hematocrit 29.3 (*)     MCV 99 (*)     MCH 32.1 (*)     RDW 18.1 (*)     Gran # (ANC) 11.6 (*)     Mono # 1.1 (*)     Gran% 81.4 (*)     Lymph% 10.7 (*)     All other components within normal limits   COMPREHENSIVE METABOLIC PANEL - Abnormal; Notable for the following:     Potassium 3.2 (*)     Glucose 171 (*)     BUN, Bld 25 (*)     Total Bilirubin 1.5 (*)     ALT 6 (*)     eGFR if  39 (*)     eGFR if non  34 (*)     All other components within normal limits   CK - Abnormal; Notable for the following:      (*)     All other components within normal limits   CK-MB - Abnormal; Notable for the following:      (*)     CPK MB 22.0 (*)     MB% 5.3 (*)     All other components within normal limits   B-TYPE NATRIURETIC PEPTIDE - Abnormal; Notable for the following:     BNP 1,313 (*)     All other components within normal limits   URINALYSIS - Abnormal; Notable for the following:     Appearance, UA Hazy (*)     Protein, UA 2+ (*)     Occult Blood UA 2+ (*)     All other components within normal limits   MAGNESIUM - Abnormal; Notable for the following:     Magnesium 1.5 (*)     All other components within normal limits   TROPONIN I   APTT   PROTIME-INR   PHOSPHORUS   TSH   URINALYSIS MICROSCOPIC        All Lab Results:  Results for orders placed or performed during the hospital encounter of 06/14/18   CBC auto differential   Result Value Ref Range    WBC 14.27 (H) 3.90 - 12.70 K/uL    RBC 2.96 (L) 4.00 - 5.40 M/uL    Hemoglobin 9.5 (L) 12.0 - 16.0 g/dL    Hematocrit 29.3 (L) 37.0 - 48.5 %    MCV 99 (H) 82 - 98 fL    MCH 32.1 (H) 27.0 - 31.0 pg    MCHC 32.4 32.0 - 36.0 g/dL     RDW 18.1 (H) 11.5 - 14.5 %    Platelets 259 150 - 350 K/uL    MPV 11.1 9.2 - 12.9 fL    Gran # (ANC) 11.6 (H) 1.8 - 7.7 K/uL    Lymph # 1.5 1.0 - 4.8 K/uL    Mono # 1.1 (H) 0.3 - 1.0 K/uL    Eos # 0.0 0.0 - 0.5 K/uL    Baso # 0.01 0.00 - 0.20 K/uL    Gran% 81.4 (H) 38.0 - 73.0 %    Lymph% 10.7 (L) 18.0 - 48.0 %    Mono% 7.7 4.0 - 15.0 %    Eosinophil% 0.1 0.0 - 8.0 %    Basophil% 0.1 0.0 - 1.9 %    Differential Method Automated    Comprehensive metabolic panel   Result Value Ref Range    Sodium 141 136 - 145 mmol/L    Potassium 3.2 (L) 3.5 - 5.1 mmol/L    Chloride 104 95 - 110 mmol/L    CO2 26 23 - 29 mmol/L    Glucose 171 (H) 70 - 110 mg/dL    BUN, Bld 25 (H) 8 - 23 mg/dL    Creatinine 1.4 0.5 - 1.4 mg/dL    Calcium 8.8 8.7 - 10.5 mg/dL    Total Protein 7.3 6.0 - 8.4 g/dL    Albumin 3.6 3.5 - 5.2 g/dL    Total Bilirubin 1.5 (H) 0.1 - 1.0 mg/dL    Alkaline Phosphatase 74 55 - 135 U/L    AST 21 10 - 40 U/L    ALT 6 (L) 10 - 44 U/L    Anion Gap 11 8 - 16 mmol/L    eGFR if African American 39 (A) >60 mL/min/1.73 m^2    eGFR if non African American 34 (A) >60 mL/min/1.73 m^2   Troponin I   Result Value Ref Range    Troponin I 0.022 0.000 - 0.026 ng/mL   CK   Result Value Ref Range     (H) 20 - 180 U/L   CK-MB   Result Value Ref Range     (H) 20 - 180 U/L    CPK MB 22.0 (H) 0.1 - 6.5 ng/mL    MB% 5.3 (H) 0.0 - 5.0 %   Brain natriuretic peptide   Result Value Ref Range    BNP 1,313 (H) 0 - 99 pg/mL   Urinalysis   Result Value Ref Range    Specimen UA Urine, Clean Catch     Color, UA Yellow Yellow, Straw, Caitlyn    Appearance, UA Hazy (A) Clear    pH, UA 7.0 5.0 - 8.0    Specific Gravity, UA 1.015 1.005 - 1.030    Protein, UA 2+ (A) Negative    Glucose, UA Negative Negative    Ketones, UA Negative Negative    Bilirubin (UA) Negative Negative    Occult Blood UA 2+ (A) Negative    Nitrite, UA Negative Negative    Urobilinogen, UA Negative <2.0 EU/dL    Leukocytes, UA Negative Negative   APTT   Result Value Ref  Range    aPTT 27.0 21.0 - 32.0 sec   Protime-INR   Result Value Ref Range    Prothrombin Time 12.1 9.0 - 12.5 sec    INR 1.2 0.8 - 1.2   Magnesium   Result Value Ref Range    Magnesium 1.5 (L) 1.6 - 2.6 mg/dL   Phosphorus   Result Value Ref Range    Phosphorus 3.3 2.7 - 4.5 mg/dL   TSH   Result Value Ref Range    TSH 3.839 0.400 - 4.000 uIU/mL   Urinalysis Microscopic   Result Value Ref Range    RBC, UA 4 0 - 4 /hpf    WBC, UA 1 0 - 5 /hpf    Bacteria, UA Occasional None-Occ /hpf    Squam Epithel, UA 1 /hpf    Hyaline Casts, UA 0 0-1/lpf /lpf    Microscopic Comment SEE COMMENT    Hemoglobin A1c if not done in past 6 weeks   Result Value Ref Range    Hemoglobin A1C 5.5 4.0 - 5.6 %    Estimated Avg Glucose 111 68 - 131 mg/dL   Vitamin B12   Result Value Ref Range    Vitamin B-12 369 210 - 950 pg/mL   Folate   Result Value Ref Range    Folate 6.9 4.0 - 24.0 ng/mL   RPR   Result Value Ref Range    RPR Non-reactive Non-reactive   Ammonia   Result Value Ref Range    Ammonia 25 10 - 50 umol/L   CBC auto differential   Result Value Ref Range    WBC 7.48 3.90 - 12.70 K/uL    RBC 2.44 (L) 4.00 - 5.40 M/uL    Hemoglobin 8.0 (L) 12.0 - 16.0 g/dL    Hematocrit 24.7 (L) 37.0 - 48.5 %     (H) 82 - 98 fL    MCH 32.8 (H) 27.0 - 31.0 pg    MCHC 32.4 32.0 - 36.0 g/dL    RDW 18.2 (H) 11.5 - 14.5 %    Platelets 205 150 - 350 K/uL    MPV 11.3 9.2 - 12.9 fL    Gran # (ANC) 4.6 1.8 - 7.7 K/uL    Lymph # 2.0 1.0 - 4.8 K/uL    Mono # 0.7 0.3 - 1.0 K/uL    Eos # 0.2 0.0 - 0.5 K/uL    Baso # 0.01 0.00 - 0.20 K/uL    Gran% 60.9 38.0 - 73.0 %    Lymph% 26.3 18.0 - 48.0 %    Mono% 9.8 4.0 - 15.0 %    Eosinophil% 2.9 0.0 - 8.0 %    Basophil% 0.1 0.0 - 1.9 %    Differential Method Automated    Comprehensive metabolic panel   Result Value Ref Range    Sodium 141 136 - 145 mmol/L    Potassium 3.5 3.5 - 5.1 mmol/L    Chloride 108 95 - 110 mmol/L    CO2 25 23 - 29 mmol/L    Glucose 103 70 - 110 mg/dL    BUN, Bld 25 (H) 8 - 23 mg/dL    Creatinine  1.3 0.5 - 1.4 mg/dL    Calcium 8.4 (L) 8.7 - 10.5 mg/dL    Total Protein 5.8 (L) 6.0 - 8.4 g/dL    Albumin 2.9 (L) 3.5 - 5.2 g/dL    Total Bilirubin 1.1 (H) 0.1 - 1.0 mg/dL    Alkaline Phosphatase 56 55 - 135 U/L    AST 20 10 - 40 U/L    ALT <5 (L) 10 - 44 U/L    Anion Gap 8 8 - 16 mmol/L    eGFR if African American 43 (A) >60 mL/min/1.73 m^2    eGFR if non African American 37 (A) >60 mL/min/1.73 m^2   Magnesium   Result Value Ref Range    Magnesium 1.6 1.6 - 2.6 mg/dL   Phosphorus   Result Value Ref Range    Phosphorus 2.9 2.7 - 4.5 mg/dL   CK   Result Value Ref Range     20 - 180 U/L   CBC auto differential   Result Value Ref Range    WBC 5.65 3.90 - 12.70 K/uL    RBC 2.55 (L) 4.00 - 5.40 M/uL    Hemoglobin 8.2 (L) 12.0 - 16.0 g/dL    Hematocrit 25.7 (L) 37.0 - 48.5 %     (H) 82 - 98 fL    MCH 32.2 (H) 27.0 - 31.0 pg    MCHC 31.9 (L) 32.0 - 36.0 g/dL    RDW 18.2 (H) 11.5 - 14.5 %    Platelets 207 150 - 350 K/uL    MPV 11.0 9.2 - 12.9 fL    Gran # (ANC) 2.6 1.8 - 7.7 K/uL    Lymph # 2.1 1.0 - 4.8 K/uL    Mono # 0.6 0.3 - 1.0 K/uL    Eos # 0.4 0.0 - 0.5 K/uL    Baso # 0.01 0.00 - 0.20 K/uL    Gran% 46.3 38.0 - 73.0 %    Lymph% 37.2 18.0 - 48.0 %    Mono% 10.1 4.0 - 15.0 %    Eosinophil% 6.2 0.0 - 8.0 %    Basophil% 0.2 0.0 - 1.9 %    Differential Method Automated    Comprehensive metabolic panel   Result Value Ref Range    Sodium 140 136 - 145 mmol/L    Potassium 3.6 3.5 - 5.1 mmol/L    Chloride 103 95 - 110 mmol/L    CO2 28 23 - 29 mmol/L    Glucose 109 70 - 110 mg/dL    BUN, Bld 27 (H) 8 - 23 mg/dL    Creatinine 1.6 (H) 0.5 - 1.4 mg/dL    Calcium 8.9 8.7 - 10.5 mg/dL    Total Protein 6.3 6.0 - 8.4 g/dL    Albumin 3.1 (L) 3.5 - 5.2 g/dL    Total Bilirubin 0.8 0.1 - 1.0 mg/dL    Alkaline Phosphatase 59 55 - 135 U/L    AST 19 10 - 40 U/L    ALT <5 (L) 10 - 44 U/L    Anion Gap 9 8 - 16 mmol/L    eGFR if African American 33 (A) >60 mL/min/1.73 m^2    eGFR if non African American 29 (A) >60 mL/min/1.73 m^2    Magnesium   Result Value Ref Range    Magnesium 1.8 1.6 - 2.6 mg/dL   Phosphorus   Result Value Ref Range    Phosphorus 3.3 2.7 - 4.5 mg/dL   CBC auto differential   Result Value Ref Range    WBC 7.06 3.90 - 12.70 K/uL    RBC 2.60 (L) 4.00 - 5.40 M/uL    Hemoglobin 8.2 (L) 12.0 - 16.0 g/dL    Hematocrit 26.1 (L) 37.0 - 48.5 %     (H) 82 - 98 fL    MCH 31.5 (H) 27.0 - 31.0 pg    MCHC 31.4 (L) 32.0 - 36.0 g/dL    RDW 18.6 (H) 11.5 - 14.5 %    Platelets 221 150 - 350 K/uL    MPV 11.5 9.2 - 12.9 fL    Gran # (ANC) 3.5 1.8 - 7.7 K/uL    Lymph # 2.6 1.0 - 4.8 K/uL    Mono # 0.6 0.3 - 1.0 K/uL    Eos # 0.4 0.0 - 0.5 K/uL    Baso # 0.02 0.00 - 0.20 K/uL    Gran% 49.5 38.0 - 73.0 %    Lymph% 36.3 18.0 - 48.0 %    Mono% 8.1 4.0 - 15.0 %    Eosinophil% 5.8 0.0 - 8.0 %    Basophil% 0.3 0.0 - 1.9 %    Differential Method Automated    Comprehensive metabolic panel   Result Value Ref Range    Sodium 138 136 - 145 mmol/L    Potassium 3.9 3.5 - 5.1 mmol/L    Chloride 102 95 - 110 mmol/L    CO2 28 23 - 29 mmol/L    Glucose 116 (H) 70 - 110 mg/dL    BUN, Bld 37 (H) 8 - 23 mg/dL    Creatinine 1.9 (H) 0.5 - 1.4 mg/dL    Calcium 9.0 8.7 - 10.5 mg/dL    Total Protein 6.4 6.0 - 8.4 g/dL    Albumin 3.1 (L) 3.5 - 5.2 g/dL    Total Bilirubin 0.8 0.1 - 1.0 mg/dL    Alkaline Phosphatase 58 55 - 135 U/L    AST 16 10 - 40 U/L    ALT <5 (L) 10 - 44 U/L    Anion Gap 8 8 - 16 mmol/L    eGFR if African American 27 (A) >60 mL/min/1.73 m^2    eGFR if non African American 23 (A) >60 mL/min/1.73 m^2   Magnesium   Result Value Ref Range    Magnesium 2.2 1.6 - 2.6 mg/dL   Phosphorus   Result Value Ref Range    Phosphorus 3.5 2.7 - 4.5 mg/dL   CBC auto differential   Result Value Ref Range    WBC 6.22 3.90 - 12.70 K/uL    RBC 2.56 (L) 4.00 - 5.40 M/uL    Hemoglobin 8.3 (L) 12.0 - 16.0 g/dL    Hematocrit 25.9 (L) 37.0 - 48.5 %     (H) 82 - 98 fL    MCH 32.4 (H) 27.0 - 31.0 pg    MCHC 32.0 32.0 - 36.0 g/dL    RDW 18.3 (H) 11.5 - 14.5 %     Platelets 210 150 - 350 K/uL    MPV 11.1 9.2 - 12.9 fL    Gran # (ANC) 3.0 1.8 - 7.7 K/uL    Lymph # 2.3 1.0 - 4.8 K/uL    Mono # 0.5 0.3 - 1.0 K/uL    Eos # 0.4 0.0 - 0.5 K/uL    Baso # 0.02 0.00 - 0.20 K/uL    Gran% 48.0 38.0 - 73.0 %    Lymph% 37.1 18.0 - 48.0 %    Mono% 8.7 4.0 - 15.0 %    Eosinophil% 5.9 0.0 - 8.0 %    Basophil% 0.3 0.0 - 1.9 %    Differential Method Automated    Comprehensive metabolic panel   Result Value Ref Range    Sodium 139 136 - 145 mmol/L    Potassium 4.4 3.5 - 5.1 mmol/L    Chloride 103 95 - 110 mmol/L    CO2 28 23 - 29 mmol/L    Glucose 109 70 - 110 mg/dL    BUN, Bld 44 (H) 8 - 23 mg/dL    Creatinine 2.1 (H) 0.5 - 1.4 mg/dL    Calcium 9.1 8.7 - 10.5 mg/dL    Total Protein 6.3 6.0 - 8.4 g/dL    Albumin 3.1 (L) 3.5 - 5.2 g/dL    Total Bilirubin 0.6 0.1 - 1.0 mg/dL    Alkaline Phosphatase 60 55 - 135 U/L    AST 19 10 - 40 U/L    ALT <5 (L) 10 - 44 U/L    Anion Gap 8 8 - 16 mmol/L    eGFR if African American 24 (A) >60 mL/min/1.73 m^2    eGFR if non African American 21 (A) >60 mL/min/1.73 m^2   Magnesium   Result Value Ref Range    Magnesium 2.5 1.6 - 2.6 mg/dL   Phosphorus   Result Value Ref Range    Phosphorus 4.1 2.7 - 4.5 mg/dL   CBC auto differential   Result Value Ref Range    WBC 5.22 3.90 - 12.70 K/uL    RBC 2.19 (L) 4.00 - 5.40 M/uL    Hemoglobin 7.1 (L) 12.0 - 16.0 g/dL    Hematocrit 21.8 (L) 37.0 - 48.5 %     (H) 82 - 98 fL    MCH 32.4 (H) 27.0 - 31.0 pg    MCHC 32.6 32.0 - 36.0 g/dL    RDW 18.0 (H) 11.5 - 14.5 %    Platelets 196 150 - 350 K/uL    MPV 10.5 9.2 - 12.9 fL    Gran # (ANC) 2.1 1.8 - 7.7 K/uL    Lymph # 2.4 1.0 - 4.8 K/uL    Mono # 0.5 0.3 - 1.0 K/uL    Eos # 0.3 0.0 - 0.5 K/uL    Baso # 0.01 0.00 - 0.20 K/uL    Gran% 40.0 38.0 - 73.0 %    Lymph% 45.6 18.0 - 48.0 %    Mono% 9.0 4.0 - 15.0 %    Eosinophil% 5.2 0.0 - 8.0 %    Basophil% 0.2 0.0 - 1.9 %    Differential Method Automated    Comprehensive metabolic panel   Result Value Ref Range    Sodium 141 136 -  145 mmol/L    Potassium 4.1 3.5 - 5.1 mmol/L    Chloride 108 95 - 110 mmol/L    CO2 27 23 - 29 mmol/L    Glucose 101 70 - 110 mg/dL    BUN, Bld 40 (H) 8 - 23 mg/dL    Creatinine 1.8 (H) 0.5 - 1.4 mg/dL    Calcium 8.6 (L) 8.7 - 10.5 mg/dL    Total Protein 5.5 (L) 6.0 - 8.4 g/dL    Albumin 2.9 (L) 3.5 - 5.2 g/dL    Total Bilirubin 0.6 0.1 - 1.0 mg/dL    Alkaline Phosphatase 56 55 - 135 U/L    AST 28 10 - 40 U/L    ALT 9 (L) 10 - 44 U/L    Anion Gap 6 (L) 8 - 16 mmol/L    eGFR if African American 29 (A) >60 mL/min/1.73 m^2    eGFR if non African American 25 (A) >60 mL/min/1.73 m^2   Magnesium   Result Value Ref Range    Magnesium 2.3 1.6 - 2.6 mg/dL   Phosphorus   Result Value Ref Range    Phosphorus 3.4 2.7 - 4.5 mg/dL   CBC auto differential   Result Value Ref Range    WBC 7.09 3.90 - 12.70 K/uL    RBC 3.08 (L) 4.00 - 5.40 M/uL    Hemoglobin 9.9 (L) 12.0 - 16.0 g/dL    Hematocrit 30.0 (L) 37.0 - 48.5 %    MCV 97 82 - 98 fL    MCH 32.1 (H) 27.0 - 31.0 pg    MCHC 33.0 32.0 - 36.0 g/dL    RDW 19.5 (H) 11.5 - 14.5 %    Platelets 235 150 - 350 K/uL    MPV 10.4 9.2 - 12.9 fL    Gran # (ANC) 3.9 1.8 - 7.7 K/uL    Lymph # 2.1 1.0 - 4.8 K/uL    Mono # 0.6 0.3 - 1.0 K/uL    Eos # 0.5 0.0 - 0.5 K/uL    Baso # 0.01 0.00 - 0.20 K/uL    Gran% 55.3 38.0 - 73.0 %    Lymph% 29.2 18.0 - 48.0 %    Mono% 8.9 4.0 - 15.0 %    Eosinophil% 6.5 0.0 - 8.0 %    Basophil% 0.1 0.0 - 1.9 %    Differential Method Automated    Comprehensive metabolic panel   Result Value Ref Range    Sodium 138 136 - 145 mmol/L    Potassium 4.4 3.5 - 5.1 mmol/L    Chloride 105 95 - 110 mmol/L    CO2 26 23 - 29 mmol/L    Glucose 127 (H) 70 - 110 mg/dL    BUN, Bld 40 (H) 8 - 23 mg/dL    Creatinine 1.8 (H) 0.5 - 1.4 mg/dL    Calcium 9.4 8.7 - 10.5 mg/dL    Total Protein 6.8 6.0 - 8.4 g/dL    Albumin 3.4 (L) 3.5 - 5.2 g/dL    Total Bilirubin 1.1 (H) 0.1 - 1.0 mg/dL    Alkaline Phosphatase 69 55 - 135 U/L    AST 34 10 - 40 U/L    ALT 13 10 - 44 U/L    Anion Gap 7 (L) 8  - 16 mmol/L    eGFR if African American 29 (A) >60 mL/min/1.73 m^2    eGFR if non African American 25 (A) >60 mL/min/1.73 m^2   Magnesium   Result Value Ref Range    Magnesium 2.2 1.6 - 2.6 mg/dL   Phosphorus   Result Value Ref Range    Phosphorus 3.2 2.7 - 4.5 mg/dL   2D echo with color flow doppler   Result Value Ref Range    EF 60 55 - 65    Mitral Valve Regurgitation MILD     Diastolic Dysfunction Yes (A)     Est. PA Systolic Pressure 35.28    Type & Screen   Result Value Ref Range    Group & Rh B NEG     Indirect Komal POS    Antibody identification   Result Value Ref Range    Antibody Identification POS    Pathologist Interpretation AB/XM   Result Value Ref Range    Pathologist Interp Difficult  AB/XM REVIEWED    POCT glucose   Result Value Ref Range    POCT Glucose 129 (H) 70 - 110 mg/dL   POCT glucose   Result Value Ref Range    POCT Glucose 110 70 - 110 mg/dL   POCT glucose   Result Value Ref Range    POCT Glucose 217 (H) 70 - 110 mg/dL   POCT glucose   Result Value Ref Range    POCT Glucose 257 (H) 70 - 110 mg/dL   POCT glucose   Result Value Ref Range    POCT Glucose 104 70 - 110 mg/dL   POCT glucose   Result Value Ref Range    POCT Glucose 101 70 - 110 mg/dL   POCT glucose   Result Value Ref Range    POCT Glucose 125 (H) 70 - 110 mg/dL   POCT glucose   Result Value Ref Range    POCT Glucose 106 70 - 110 mg/dL   POCT glucose   Result Value Ref Range    POCT Glucose 111 (H) 70 - 110 mg/dL   POCT glucose   Result Value Ref Range    POCT Glucose 101 70 - 110 mg/dL   POCT glucose   Result Value Ref Range    POCT Glucose 118 (H) 70 - 110 mg/dL   POCT glucose   Result Value Ref Range    POCT Glucose 128 (H) 70 - 110 mg/dL   POCT glucose   Result Value Ref Range    POCT Glucose 112 (H) 70 - 110 mg/dL   POCT glucose   Result Value Ref Range    POCT Glucose 109 70 - 110 mg/dL   POCT glucose   Result Value Ref Range    POCT Glucose 103 70 - 110 mg/dL   POCT glucose   Result Value Ref Range    POCT Glucose 212 (H)  70 - 110 mg/dL   POCT glucose   Result Value Ref Range    POCT Glucose 126 (H) 70 - 110 mg/dL   POCT glucose   Result Value Ref Range    POCT Glucose 105 70 - 110 mg/dL   POCT glucose   Result Value Ref Range    POCT Glucose 154 (H) 70 - 110 mg/dL   POCT glucose   Result Value Ref Range    POCT Glucose 102 70 - 110 mg/dL   POCT glucose   Result Value Ref Range    POCT Glucose 108 70 - 110 mg/dL   POCT glucose   Result Value Ref Range    POCT Glucose 118 (H) 70 - 110 mg/dL   POCT glucose   Result Value Ref Range    POCT Glucose 217 (H) 70 - 110 mg/dL   Prepare RBC 1 Unit   Result Value Ref Range    UNIT NUMBER [id number]     PRODUCT CODE [id number]     DISPENSE STATUS TRANSFUSED     CODING SYSTEM [id number]     Unit Blood Type Code 9500     Unit Blood Type O NEG     Unit Expiration [id number]          Imaging Results:  Imaging Results          X-Ray Chest 1 View (Final result)  Result time 06/14/18 11:31:54    Final result by ISIDORO Payne Sr., MD (06/14/18 11:31:54)                 Impression:      1. There has been interval opacification of the base of the left hemithorax.  This is characteristic of a small left pleural effusion.  2. There is no acute pulmonary process visualized.  3. There is a mild amount of dextroconvex scoliosis of the thoracic spine.  .      Electronically signed by: Jonathon Payne MD  Date:    06/14/2018  Time:    11:31             Narrative:    EXAMINATION:  XR CHEST 1 VIEW    CLINICAL HISTORY:  weakness;    COMPARISON:  12/06/2014    FINDINGS:  The size of the heart is prominent.  There is no acute pulmonary process visualized.  There are calcific densities projected over the right breast and right hemithorax.  There has been interval opacification of the base of the left hemithorax.  There is no pneumothorax.  The right costophrenic angle is sharp.  There is a mild amount of dextroconvex scoliosis of the thoracic spine.                               CT Head Without Contrast (Final  result)  Result time 06/14/18 11:28:56    Final result by ISIDORO Payne Sr., MD (06/14/18 11:28:56)                 Impression:      1. There is no evidence of an acute ischemic event. There are moderate bilateral periventricular ischemic white matter changes.  2. There is no acute appearing intracranial hemorrhage.  All CT scans at this facility use dose modulation, iterative reconstruction, and/or weight base dosing when appropriate to reduce radiation dose when appropriate to reduce radiation dose to as low as reasonably achievable.      Electronically signed by: Jonathon Payne MD  Date:    06/14/2018  Time:    11:28             Narrative:    EXAMINATION:  CT HEAD WITHOUT CONTRAST    CLINICAL HISTORY:  WeaknessDizziness;    TECHNIQUE:  Standard brain CT protocol without IV contrast was performed.    COMPARISON:  06/14/2017    FINDINGS:  There is no evidence of an acute ischemic event.  There are moderate bilateral periventricular ischemic white matter changes.  There is no acute appearing intracranial hemorrhage.  There is no calvarial fracture.  The visualized portion of the paranasal sinuses is clear.                               The EKG was ordered, reviewed, and independently interpreted by the ED provider.  Interpretation time: 11:57  Rate: 78 BPM  Rhythm: normal sinus rhythm  Interpretation: Possible L atrial enlargement. Nonspecific ST abnormality. No STEMI.         The Emergency Provider reviewed the vital signs and test results, which are outlined above.    ED Discussion     1:26 PM: Discussed case with CANDACE Chin (Hospital Medicine), who agrees with current care and management of pt and accepts admission.   Admitting Service: Hospital medicine   Admitting Physician: Dr. Chavez  Admit to: Obs    1:36 PM: Re-evaluated pt. I have discussed test results, shared treatment plan, and the need for admission with patient and family at bedside. Pt and family express understanding at this time and  agree with all information. All questions answered. Pt and family have no further questions or concerns at this time. Pt is ready for admit.          ED Medication(s):  Medications   sodium chloride 0.9% bolus 1,000 mL (0 mLs Intravenous Stopped 6/14/18 1349)   potassium chloride SA CR tablet 40 mEq (40 mEq Oral Given 6/14/18 1533)   hydrALAZINE injection 10 mg (10 mg Intravenous Given 6/14/18 1537)       Discharge Medication List as of 6/20/2018 11:00 AM          Follow-up Information     Schedule an appointment as soon as possible for a visit with Gabby Carrera MD.    Specialty:  Family Medicine  Why:  hospital follow up   Contact information:  [address] [place] [address] [phone]             LORENA Cruz. Schedule an appointment as soon as possible for a visit in 1 week.    Specialty:  Cardiology  Why:  hospital follow up, possible stress test   Contact information:  [address]  [place] [id number] [phone]             [facility].    Specialties:  Nursing Home Agency, SNF Agency  Contact information:  [address] [id number] [phone]                     Medical Decision Making    Medical Decision Making:   Clinical Tests:   Lab Tests: Ordered and Reviewed  Radiological Study: Ordered and Reviewed  Medical Tests: Ordered and Reviewed           Scribe Attestation:   Scribe #1: I performed the above scribed service and the documentation accurately describes the services I performed. I attest to the accuracy of the note.    Attending:   Physician Attestation Statement for Scribe #1: I, Tiago Grant MD, personally performed the services described in this documentation, as scribed by Erica Lema, in my presence, and it is both accurate and complete.          Clinical Impression       ICD-10-CM ICD-9-CM   1. Weakness R53.1 780.79   2. Near syncope R55 780.2   3. Syncope R55 780.2   4. Hypertensive urgency I16.0 401.9        Disposition:   Disposition: Placed in Observation  Condition: Fair         Tiago Grant MD  06/25/18 6779

## 2018-06-14 NOTE — SUBJECTIVE & OBJECTIVE
Past Medical History:   Diagnosis Date    Anemia     Arthritis     Back pain     Breast cancer 2008    right    Diabetes with neurologic complications     Glaucoma     Gout     Hyperlipidemia     Hypertension     Hypoglycemia 12/6/2014    Kidney disease, chronic, stage IV (GFR 15-29 ml/min)     Renal manifestation of secondary diabetes mellitus     Retinitis pigmentosa 05/04/2016    outside eye exam (No BDR)    Skin cancer     several       Past Surgical History:   Procedure Laterality Date    APPENDECTOMY  1964    incidental with choly    BREAST SURGERY Right 08/13/2008    excisional Bx =infiltrating ductal ca, low grade ;calcifications identified within benign and malignant ducts.    BREAST SURGERY Right 09/09/2008    sentinal node and lymoh node dissection    CHOLECYSTECTOMY  1964    EYE SURGERY Bilateral 2006    IOL ou ( 2006) and YAGOD    HYSTERECTOMY  1971    Moh's  procedure Left 04/25/2011    BCC, left chin; BSC on Lt upper lip, BCC lt side of nose (dR FELIZ)    SPINE SURGERY  1975    Lumbosacral disc       Review of patient's allergies indicates:   Allergen Reactions    Ace inhibitors      Caused hyperkalemia    Arb-angiotensin receptor antagonist Other (See Comments)     hyperkalemia    Codeine Nausea Only    Morphine Nausea Only       Current Facility-Administered Medications on File Prior to Encounter   Medication    darbepoetin flaquito-polysorbate injection 200 mcg    sodium chloride 0.9% flush 10 mL     Current Outpatient Prescriptions on File Prior to Encounter   Medication Sig    allopurinol (ZYLOPRIM) 100 MG tablet TAKE 2 TABLETS BY MOUTH ONCE DAILY    amLODIPine (NORVASC) 5 MG tablet Take 1 tablet (5 mg total) by mouth once daily.    blood sugar diagnostic (ONETOUCH ULTRA TEST) Strp TEST BLOOD SUGAR EVERY DAY AS DIRECTED    blood sugar diagnostic Strp 1 strip by Misc.(Non-Drug; Combo Route) route 3 (three) times daily.    blood-glucose meter kit Use as instructed     carvedilol (COREG) 12.5 MG tablet TAKE 1 TABLET BY MOUTH TWICE DAILY    ergocalciferol (ERGOCALCIFEROL) 50,000 unit Cap Take 1 capsule by mouth every 7 days.    ergocalciferol (ERGOCALCIFEROL) 50,000 unit Cap TAKE 1 CAPSULE BY MOUTH EVERY 7 DAYS    furosemide (LASIX) 40 MG tablet Take 40 mg by mouth as needed.    lancets Misc 1 Stick by Misc.(Non-Drug; Combo Route) route 3 (three) times daily.    lancing device with lancets Kit 1 each by Misc.(Non-Drug; Combo Route) route 3 (three) times daily.     Family History     Problem Relation (Age of Onset)    Cancer Mother, Father    Diabetes Sister    Heart disease Brother, Brother    Stroke Brother        Social History Main Topics    Smoking status: Never Smoker    Smokeless tobacco: Never Used    Alcohol use No    Drug use: No    Sexual activity: No     Review of Systems   Constitutional: Positive for fatigue. Negative for appetite change, chills, diaphoresis and fever.   HENT: Negative for congestion, ear pain, mouth sores, sore throat and trouble swallowing.    Eyes: Negative for pain and visual disturbance.   Respiratory: Negative for cough, chest tightness and shortness of breath.    Cardiovascular: Negative for chest pain, palpitations and leg swelling.   Gastrointestinal: Negative for abdominal pain, constipation and nausea.   Endocrine: Negative for cold intolerance, heat intolerance, polydipsia and polyuria.   Genitourinary: Negative for dysuria, frequency and hematuria.   Musculoskeletal: Negative for arthralgias, back pain, myalgias and neck pain.   Skin: Negative for pallor, rash and wound.   Allergic/Immunologic: Negative for environmental allergies and immunocompromised state.   Neurological: Negative for dizziness, seizures, syncope, weakness, numbness and headaches.        Positive for a fall.    Hematological: Negative for adenopathy. Does not bruise/bleed easily.   Psychiatric/Behavioral: Negative for agitation, confusion and sleep  disturbance.     Objective:     Vital Signs (Most Recent):  Temp: 98.5 °F (36.9 °C) (06/14/18 1300)  Pulse: 79 (06/14/18 1502)  Resp: (!) 30 (06/14/18 1502)  BP: (!) 202/93 (06/14/18 1502)  SpO2: 100 % (06/14/18 1502) Vital Signs (24h Range):  Temp:  [98.3 °F (36.8 °C)-98.5 °F (36.9 °C)] 98.5 °F (36.9 °C)  Pulse:  [74-83] 79  Resp:  [18-30] 30  SpO2:  [98 %-100 %] 100 %  BP: (136-224)/(75-93) 202/93     Weight: 64.7 kg (142 lb 9.6 oz)  Body mass index is 26.08 kg/m².    Physical Exam   Constitutional: She is cooperative. She appears ill. No distress.   HENT:   Head: Normocephalic and atraumatic.   Eyes: Conjunctivae are normal.   PERRL   Neck: Neck supple. No JVD present.   Cardiovascular: Normal rate, regular rhythm and normal heart sounds.    Pulmonary/Chest: Effort normal and breath sounds normal.   Abdominal: Soft. Bowel sounds are normal. She exhibits no distension. There is no tenderness.   Musculoskeletal: Normal range of motion. She exhibits edema (+1 bilateral lower extremity ).   Neurological: She is alert.   Oriented to person and place, but not time.    Skin: Skin is warm and dry.   Psychiatric: She has a normal mood and affect. Her behavior is normal. Thought content normal. Cognition and memory are impaired.   Nursing note and vitals reviewed.          Significant Labs:   CBC:   Recent Labs  Lab 06/14/18  1152   WBC 14.27*   HGB 9.5*   HCT 29.3*        CMP:   Recent Labs  Lab 06/14/18  1152      K 3.2*      CO2 26   *   BUN 25*   CREATININE 1.4   CALCIUM 8.8   PROT 7.3   ALBUMIN 3.6   BILITOT 1.5*   ALKPHOS 74   AST 21   ALT 6*   ANIONGAP 11   EGFRNONAA 34*     All pertinent labs within the past 24 hours have been reviewed.    Significant Imaging: I have reviewed all pertinent imaging results/findings within the past 24 hours.

## 2018-06-14 NOTE — ED NOTES
Pt lying down in bed.  Resp e/u, skin w/d. Family at bedside.  MD in room for re-evaluation.  Sr up x 2. Bed locked and low. Call bell at side.  Will monitor

## 2018-06-14 NOTE — ASSESSMENT & PLAN NOTE
-Unclear if this is patient's baseline.   -Check ammonia, TSH, B12, folic acid and RPR to rule out other possible metabolic causes.   -Outpatient Neurology follow up recommended.

## 2018-06-14 NOTE — PLAN OF CARE
06/14/18 1416   PEREZ Message   Medicare Outpatient and Observation Notification regarding financial responsibility Given to patient/caregiver;Explained to patient/caregiver;Signed/date by patient/caregiver   Date PEREZ was signed 06/14/18   Time PEREZ was signed 1414

## 2018-06-14 NOTE — H&P
[facility] Hospital Medicine  History & Physical    Patient Name: Brynn Augustine  MRN: [id number]  Admission Date: 6/14/2018  Attending Physician: Tiago Grant,*   Primary Care Provider: Gabby Carrera MD         Patient information was obtained from patient, past medical records and ER records.     Subjective:     Principal Problem:Hypertensive urgency    Chief Complaint:   Chief Complaint   Patient presents with    Fatigue     could not get out of the bathtub for 3 hours        HPI: Brynn Augustine is an 87 year old female with diabetes mellitus type II, hypertension and stage IV chronic kidney disease who presents to the ED with reports of a fall at home. The patient's son reports that he found her laying in her bath tub this morning at 7 AM. He suspects she fell into the tub while attempting to use the restroom during the night. The patient reports no complaints other than feeling fatigued. She denies pain. She is at her normal baseline health and does a history of chronic vision loss. She is oriented to person and place but does not time. At the time of exam, she is unable to recall common details such as her birthday and who is president. However, her son reports that she was able to recall these details earlier in the day. He also reports that she does not have a history of memory issues or a diagnosis of dementia. She lives alone, but he checks on her daily. In the ED, the patient had an increased CPK of 419, CPK MB of 22.0, and BNP of 1,313. Code status was discussed. She is a DNR. Her three children are her surrogate medical decision makers.       Past Medical History:   Diagnosis Date    Anemia     Arthritis     Back pain     Breast cancer 2008    right    Diabetes with neurologic complications     Glaucoma     Gout     Hyperlipidemia     Hypertension     Hypoglycemia 12/6/2014    Kidney disease, chronic, stage IV (GFR 15-29 ml/min)     Renal manifestation of  secondary diabetes mellitus     Retinitis pigmentosa 05/04/2016    outside eye exam (No BDR)    Skin cancer     several       Past Surgical History:   Procedure Laterality Date    APPENDECTOMY  1964    incidental with choly    BREAST SURGERY Right 08/13/2008    excisional Bx =infiltrating ductal ca, low grade ;calcifications identified within benign and malignant ducts.    BREAST SURGERY Right 09/09/2008    sentinal node and lymoh node dissection    CHOLECYSTECTOMY  1964    EYE SURGERY Bilateral 2006    IOL ou ( 2006) and YAGOD    HYSTERECTOMY  1971    Moh's  procedure Left 04/25/2011    BCC, left chin; BSC on Lt upper lip, BCC lt side of nose (dR FELIZ)    SPINE SURGERY  1975    Lumbosacral disc       Review of patient's allergies indicates:   Allergen Reactions    Ace inhibitors      Caused hyperkalemia    Arb-angiotensin receptor antagonist Other (See Comments)     hyperkalemia    Codeine Nausea Only    Morphine Nausea Only       Current Facility-Administered Medications on File Prior to Encounter   Medication    darbepoetin flaquito-polysorbate injection 200 mcg    sodium chloride 0.9% flush 10 mL     Current Outpatient Prescriptions on File Prior to Encounter   Medication Sig    allopurinol (ZYLOPRIM) 100 MG tablet TAKE 2 TABLETS BY MOUTH ONCE DAILY    amLODIPine (NORVASC) 5 MG tablet Take 1 tablet (5 mg total) by mouth once daily.    blood sugar diagnostic (ONETOUCH ULTRA TEST) Strp TEST BLOOD SUGAR EVERY DAY AS DIRECTED    blood sugar diagnostic Strp 1 strip by Misc.(Non-Drug; Combo Route) route 3 (three) times daily.    blood-glucose meter kit Use as instructed    carvedilol (COREG) 12.5 MG tablet TAKE 1 TABLET BY MOUTH TWICE DAILY    ergocalciferol (ERGOCALCIFEROL) 50,000 unit Cap Take 1 capsule by mouth every 7 days.    ergocalciferol (ERGOCALCIFEROL) 50,000 unit Cap TAKE 1 CAPSULE BY MOUTH EVERY 7 DAYS    furosemide (LASIX) 40 MG tablet Take 40 mg by mouth as needed.    lancets  Misc 1 Stick by Misc.(Non-Drug; Combo Route) route 3 (three) times daily.    lancing device with lancets Kit 1 each by Misc.(Non-Drug; Combo Route) route 3 (three) times daily.     Family History     Problem Relation (Age of Onset)    Cancer Mother, Father    Diabetes Sister    Heart disease Brother, Brother    Stroke Brother        Social History Main Topics    Smoking status: Never Smoker    Smokeless tobacco: Never Used    Alcohol use No    Drug use: No    Sexual activity: No     Review of Systems   Constitutional: Positive for fatigue. Negative for appetite change, chills, diaphoresis and fever.   HENT: Negative for congestion, ear pain, mouth sores, sore throat and trouble swallowing.    Eyes: Negative for pain and visual disturbance.   Respiratory: Negative for cough, chest tightness and shortness of breath.    Cardiovascular: Negative for chest pain, palpitations and leg swelling.   Gastrointestinal: Negative for abdominal pain, constipation and nausea.   Endocrine: Negative for cold intolerance, heat intolerance, polydipsia and polyuria.   Genitourinary: Negative for dysuria, frequency and hematuria.   Musculoskeletal: Negative for arthralgias, back pain, myalgias and neck pain.   Skin: Negative for pallor, rash and wound.   Allergic/Immunologic: Negative for environmental allergies and immunocompromised state.   Neurological: Negative for dizziness, seizures, syncope, weakness, numbness and headaches.        Positive for a fall.    Hematological: Negative for adenopathy. Does not bruise/bleed easily.   Psychiatric/Behavioral: Negative for agitation, confusion and sleep disturbance.     Objective:     Vital Signs (Most Recent):  Temp: 98.5 °F (36.9 °C) (06/14/18 1300)  Pulse: 79 (06/14/18 1502)  Resp: (!) 30 (06/14/18 1502)  BP: (!) 202/93 (06/14/18 1502)  SpO2: 100 % (06/14/18 1502) Vital Signs (24h Range):  Temp:  [98.3 °F (36.8 °C)-98.5 °F (36.9 °C)] 98.5 °F (36.9 °C)  Pulse:  [74-83] 79  Resp:   [18-30] 30  SpO2:  [98 %-100 %] 100 %  BP: (136-224)/(75-93) 202/93     Weight: 64.7 kg (142 lb 9.6 oz)  Body mass index is 26.08 kg/m².    Physical Exam   Constitutional: She is cooperative. She appears ill. No distress.   HENT:   Head: Normocephalic and atraumatic.   Eyes: Conjunctivae are normal.   PERRL   Neck: Neck supple. No JVD present.   Cardiovascular: Normal rate, regular rhythm and normal heart sounds.    Pulmonary/Chest: Effort normal and breath sounds normal.   Abdominal: Soft. Bowel sounds are normal. She exhibits no distension. There is no tenderness.   Musculoskeletal: Normal range of motion. She exhibits edema (+1 bilateral lower extremity ).   Neurological: She is alert.   Oriented to person and place, but not time.    Skin: Skin is warm and dry.   Psychiatric: She has a normal mood and affect. Her behavior is normal. Thought content normal. Cognition and memory are impaired.   Nursing note and vitals reviewed.          Significant Labs:   CBC:   Recent Labs  Lab 06/14/18  1152   WBC 14.27*   HGB 9.5*   HCT 29.3*        CMP:   Recent Labs  Lab 06/14/18  1152      K 3.2*      CO2 26   *   BUN 25*   CREATININE 1.4   CALCIUM 8.8   PROT 7.3   ALBUMIN 3.6   BILITOT 1.5*   ALKPHOS 74   AST 21   ALT 6*   ANIONGAP 11   EGFRNONAA 34*     All pertinent labs within the past 24 hours have been reviewed.    Significant Imaging: I have reviewed all pertinent imaging results/findings within the past 24 hours.    Assessment/Plan:     * Hypertensive urgency    -Resume patient's Norvasc and Coreg.   -Hydralazine as needed.           Encephalopathy    -Unclear if this is patient's baseline.   -Check ammonia, TSH, B12, folic acid and RPR to rule out other possible metabolic causes.   -Outpatient Neurology follow up recommended.           Elevated CPK    -Likely due to patient laying bathtub for hours.   -Monitor.           At risk for falls    -Bed alarm.   -Fall precautions.   -PT and OT  consults.   -Home health.           Hypokalemia    Monitor and replace as needed.           Hypomagnesemia    Monitor and replace as needed.           DM II (diabetes mellitus, type II), controlled    -NISS.   -ADA diet.   -Check hemoglobin A1C.           CKD (chronic kidney disease), stage IV    -Creatinine at baseline.   -Appropriately dose medications and monitor.             VTE Risk Mitigation         Ordered     heparin (porcine) injection 5,000 Units  Every 8 hours      06/14/18 1622     IP VTE HIGH RISK PATIENT  Once      06/14/18 1348             CANDACE Chin  Department of Hospital Medicine   [facility]

## 2018-06-15 PROBLEM — R55 SYNCOPE: Status: ACTIVE | Noted: 2018-06-15

## 2018-06-15 PROBLEM — R74.8 ELEVATED CPK: Status: RESOLVED | Noted: 2018-06-14 | Resolved: 2018-06-15

## 2018-06-15 LAB
ALBUMIN SERPL BCP-MCNC: 2.9 G/DL
ALP SERPL-CCNC: 56 U/L
ALT SERPL W/O P-5'-P-CCNC: <5 U/L
ANION GAP SERPL CALC-SCNC: 8 MMOL/L
AST SERPL-CCNC: 20 U/L
BASOPHILS # BLD AUTO: 0.01 K/UL
BASOPHILS NFR BLD: 0.1 %
BILIRUB SERPL-MCNC: 1.1 MG/DL
BUN SERPL-MCNC: 25 MG/DL
CALCIUM SERPL-MCNC: 8.4 MG/DL
CHLORIDE SERPL-SCNC: 108 MMOL/L
CK SERPL-CCNC: 147 U/L
CO2 SERPL-SCNC: 25 MMOL/L
CREAT SERPL-MCNC: 1.3 MG/DL
DIASTOLIC DYSFUNCTION: YES
DIFFERENTIAL METHOD: ABNORMAL
EOSINOPHIL # BLD AUTO: 0.2 K/UL
EOSINOPHIL NFR BLD: 2.9 %
ERYTHROCYTE [DISTWIDTH] IN BLOOD BY AUTOMATED COUNT: 18.2 %
EST. GFR  (AFRICAN AMERICAN): 43 ML/MIN/1.73 M^2
EST. GFR  (NON AFRICAN AMERICAN): 37 ML/MIN/1.73 M^2
ESTIMATED AVG GLUCOSE: 111 MG/DL
ESTIMATED PA SYSTOLIC PRESSURE: 35.28
FOLATE SERPL-MCNC: 6.9 NG/ML
GLUCOSE SERPL-MCNC: 103 MG/DL
HBA1C MFR BLD HPLC: 5.5 %
HCT VFR BLD AUTO: 24.7 %
HGB BLD-MCNC: 8 G/DL
LYMPHOCYTES # BLD AUTO: 2 K/UL
LYMPHOCYTES NFR BLD: 26.3 %
MAGNESIUM SERPL-MCNC: 1.6 MG/DL
MCH RBC QN AUTO: 32.8 PG
MCHC RBC AUTO-ENTMCNC: 32.4 G/DL
MCV RBC AUTO: 101 FL
MITRAL VALVE REGURGITATION: ABNORMAL
MONOCYTES # BLD AUTO: 0.7 K/UL
MONOCYTES NFR BLD: 9.8 %
NEUTROPHILS # BLD AUTO: 4.6 K/UL
NEUTROPHILS NFR BLD: 60.9 %
PHOSPHATE SERPL-MCNC: 2.9 MG/DL
PLATELET # BLD AUTO: 205 K/UL
PMV BLD AUTO: 11.3 FL
POCT GLUCOSE: 101 MG/DL (ref 70–110)
POCT GLUCOSE: 104 MG/DL (ref 70–110)
POCT GLUCOSE: 110 MG/DL (ref 70–110)
POCT GLUCOSE: 217 MG/DL (ref 70–110)
POCT GLUCOSE: 257 MG/DL (ref 70–110)
POTASSIUM SERPL-SCNC: 3.5 MMOL/L
PROT SERPL-MCNC: 5.8 G/DL
RBC # BLD AUTO: 2.44 M/UL
RETIRED EF AND QEF - SEE NOTES: 60 (ref 55–65)
RPR SER QL: NORMAL
SODIUM SERPL-SCNC: 141 MMOL/L
VIT B12 SERPL-MCNC: 369 PG/ML
WBC # BLD AUTO: 7.48 K/UL

## 2018-06-15 PROCEDURE — G0378 HOSPITAL OBSERVATION PER HR: HCPCS

## 2018-06-15 PROCEDURE — 25000003 PHARM REV CODE 250: Performed by: PHYSICIAN ASSISTANT

## 2018-06-15 PROCEDURE — 80053 COMPREHEN METABOLIC PANEL: CPT

## 2018-06-15 PROCEDURE — G8988 SELF CARE GOAL STATUS: HCPCS | Mod: CK

## 2018-06-15 PROCEDURE — G8979 MOBILITY GOAL STATUS: HCPCS | Mod: CJ

## 2018-06-15 PROCEDURE — 97161 PT EVAL LOW COMPLEX 20 MIN: CPT

## 2018-06-15 PROCEDURE — 97166 OT EVAL MOD COMPLEX 45 MIN: CPT

## 2018-06-15 PROCEDURE — 93306 TTE W/DOPPLER COMPLETE: CPT

## 2018-06-15 PROCEDURE — 97116 GAIT TRAINING THERAPY: CPT

## 2018-06-15 PROCEDURE — 84100 ASSAY OF PHOSPHORUS: CPT

## 2018-06-15 PROCEDURE — 27100108

## 2018-06-15 PROCEDURE — G8978 MOBILITY CURRENT STATUS: HCPCS | Mod: CK

## 2018-06-15 PROCEDURE — 96372 THER/PROPH/DIAG INJ SC/IM: CPT

## 2018-06-15 PROCEDURE — 85025 COMPLETE CBC W/AUTO DIFF WBC: CPT

## 2018-06-15 PROCEDURE — 21400001 HC TELEMETRY ROOM

## 2018-06-15 PROCEDURE — 93306 TTE W/DOPPLER COMPLETE: CPT | Mod: 26,,, | Performed by: INTERNAL MEDICINE

## 2018-06-15 PROCEDURE — G8987 SELF CARE CURRENT STATUS: HCPCS | Mod: CL

## 2018-06-15 PROCEDURE — 63600175 PHARM REV CODE 636 W HCPCS: Performed by: PHYSICIAN ASSISTANT

## 2018-06-15 PROCEDURE — 99900035 HC TECH TIME PER 15 MIN (STAT)

## 2018-06-15 PROCEDURE — 83735 ASSAY OF MAGNESIUM: CPT

## 2018-06-15 PROCEDURE — 82550 ASSAY OF CK (CPK): CPT

## 2018-06-15 RX ORDER — CARVEDILOL 6.25 MG/1
6.25 TABLET ORAL 2 TIMES DAILY
Status: DISCONTINUED | OUTPATIENT
Start: 2018-06-15 | End: 2018-06-18

## 2018-06-15 RX ORDER — AMLODIPINE BESYLATE 5 MG/1
5 TABLET ORAL DAILY
Status: DISCONTINUED | OUTPATIENT
Start: 2018-06-16 | End: 2018-06-18

## 2018-06-15 RX ADMIN — MAGNESIUM OXIDE TAB 400 MG (241.3 MG ELEMENTAL MG) 400 MG: 400 (241.3 MG) TAB at 09:06

## 2018-06-15 RX ADMIN — HEPARIN SODIUM 5000 UNITS: 5000 INJECTION, SOLUTION INTRAVENOUS; SUBCUTANEOUS at 02:06

## 2018-06-15 RX ADMIN — INSULIN ASPART 3 UNITS: 100 INJECTION, SOLUTION INTRAVENOUS; SUBCUTANEOUS at 11:06

## 2018-06-15 RX ADMIN — MAGNESIUM OXIDE TAB 400 MG (241.3 MG ELEMENTAL MG) 400 MG: 400 (241.3 MG) TAB at 08:06

## 2018-06-15 RX ADMIN — HEPARIN SODIUM 5000 UNITS: 5000 INJECTION, SOLUTION INTRAVENOUS; SUBCUTANEOUS at 09:06

## 2018-06-15 RX ADMIN — CARVEDILOL 6.25 MG: 6.25 TABLET, FILM COATED ORAL at 09:06

## 2018-06-15 RX ADMIN — HEPARIN SODIUM 5000 UNITS: 5000 INJECTION, SOLUTION INTRAVENOUS; SUBCUTANEOUS at 06:06

## 2018-06-15 RX ADMIN — CARVEDILOL 12.5 MG: 12.5 TABLET, FILM COATED ORAL at 08:06

## 2018-06-15 RX ADMIN — AMLODIPINE BESYLATE 5 MG: 5 TABLET ORAL at 08:06

## 2018-06-15 RX ADMIN — FUROSEMIDE 40 MG: 40 TABLET ORAL at 08:06

## 2018-06-15 RX ADMIN — ALLOPURINOL 200 MG: 100 TABLET ORAL at 08:06

## 2018-06-15 NOTE — HOSPITAL COURSE
Ms Augustine is a 87 year female with PMHx of HTN, CKD and DM. She was admitted to [facility] after reports of being found in her bathtub after a fall. Upon arrival, she was found to have a hypertensive urgency, mild encephalopathy, elevated CPK, hypokalemia and hypomagnesemia. The patient was observed overnight without incident. However, this morning, while on the toilet having a bowel movement, she had a syncopal episode. The patient was further evaluated with orthostatic vital signs, 2D echo and carotid US. There were no significant findings. Her Coreg dose was decreased and her Lasix was held. Physical therapy evaluated the patient and recommended skilled nursing care. This was discussed with the patient and family. Patient and family was provided SNF listing for in [facility]. 2D echo revealed Normal EF 60-65%, DD, and pulmonary HTN. Bilateral carotid US: 20-50% stenosis within the right internal carotid artery and 50-59% stenosis within the left internal carotid artery. Awaiting on placement. While sitting up in chair today, nurse found patient difficult to aroused, eyes open, awaken to painful stimuli SBP in 80's. Patient assisted back to bed, lethargic, however back to baseline. Continued hydration with IVF, AM lab on yesterday showed  H & H decrease 7.1/21.8. She received one units of PRBC and H &H this AM 9.9/30.0  Renal function improved as well Creatinine 1.8. Patient sit up in chair this AM, worked with PT and tolerated well. She was seen, examined and deem suitable for discharge to SNF.

## 2018-06-15 NOTE — PROGRESS NOTES
I WAS OUTSIDE THE ROOM WHEN JUANPABLO SHELLEY WENT IN THE ROOM AND PT HAD PASSED OUT ON THE TOILET. GOT PT IN THE BED AND PTS RR WAS 4 WITH A HR, AMBU BAG GRABBED AND PT GIVEN A FEW BREATHS AND STERNAL RUBBED. PT THEN CAME TO AND RAPID ENDED

## 2018-06-15 NOTE — PLAN OF CARE
CM met with patient, family at bedside.CM received consult to discuss HH placement with patient. Discharge planning assessment completed with patient's son Sunil. Patient lives alone and IADLs.  Family would like to speak with provider before establishing a discharge plan, CM notified KATY Hager states she will inform CANDACE Gaston.  Payor: [employer] MANAGED MEDICARE/[employer] MEDICARE HMO.       06/15/18 1345   Discharge Assessment   Assessment Type Discharge Planning Assessment   Confirmed/corrected address and phone number on facesheet? Yes   Assessment information obtained from? Caregiver  (son Sunil)   Prior to hospitilization cognitive status: Unable to Assess   Prior to hospitalization functional status: Assistive Equipment   Current Functional Status: Needs Assistance;Assistive Equipment   Lives With alone   Able to Return to Prior Arrangements unable to determine at this time (comments)   Is patient able to care for self after discharge? Unable to determine at this time (comments)   Who are your caregiver(s) and their phone number(s)? Sunil Landis son [phone],  Tiffanie Landis daughter-in-law [phone], Tushar Landis son [phone]   Patient currently being followed by outpatient case management? Unable to determine (comments)   Patient currently receives any other outside agency services? No   Equipment Currently Used at Home cane, straight;walker, rolling   Do you have any problems affording any of your prescribed medications? No   Is the patient taking medications as prescribed? yes   Does the patient have transportation home? Yes   Transportation Available family or friend will provide   Dialysis Name and Scheduled days N/A   Does the patient receive services at the [facility]? No   Discharge Plan A Home Health;Home   Discharge Plan B Skilled Nursing Facility   Patient/Family In Agreement With Plan unable to assess

## 2018-06-15 NOTE — ASSESSMENT & PLAN NOTE
-Improved.   -Coreg dose decreased due to syncope.   -Continue Norvasc.   -Hydralazine as needed.

## 2018-06-15 NOTE — CONSULTS
CM went to follow up on discharge plan, family member at bedside. Family member states she still have not spoke with provider yet and not able to reach a decision on discharge planning at this time. CM provided with list of  Agencies and  Facilities from [employer] website.

## 2018-06-15 NOTE — PROGRESS NOTES
[facility] Hospital Medicine  Progress Note    Patient Name: Brynn Augustine  MRN: [id number]  Patient Class: OP- Observation   Admission Date: 6/14/2018  Length of Stay: 0 days  Attending Physician: Pk Chavez MD  Primary Care Provider: Gabby Carrera MD        Subjective:     Principal Problem:Syncope    HPI:  Brynn Augustine is an 87 year old female with diabetes mellitus type II, hypertension and stage IV chronic kidney disease who presents to the ED with reports of a fall at home. The patient's son reports that he found her laying in her bath tub this morning at 7 AM. He suspects she fell into the tub while attempting to use the restroom during the night. The patient reports no complaints other than feeling fatigued. She denies pain. She is at her normal baseline health and does a history of chronic vision loss. She is oriented to person and place but does not time. At the time of exam, she is unable to recall common details such as her birthday and who is president. However, her son reports that she was able to recall these details earlier in the day. He also reports that she does not have a history of memory issues or a diagnosis of dementia. She lives alone, but he checks on her daily. In the ED, the patient had an increased CPK of 419, CPK MB of 22.0, and BNP of 1,313. Code status was discussed. She is a DNR. Her three children are her surrogate medical decision makers.       Hospital Course:  The patient was placed in Observation due to reports of being found in her bathtub after a fall. Upon arrival, she was found to have a hypertensive urgency, mild encephalopathy, elevated CPK, hypokalemia and hypomagnesemia. The patient was observed overnight without incident. However, this morning, while on the toilet having a bowel movement, she had a syncopal episode. The patient was further evaluated with orthostatic vital signs, 2D echo and carotid US. There were no significant findings.  Her Coreg dose was decreased and her Lasix was held. Physical therapy evaluated the patient and recommended skilled nursing care. This was discussed with the patient and family. The patient does not want to go to SNF, but plans to discuss with her family.     Interval History: The patient had a syncope episode while having a bowel movement this morning. She is otherwise feeling well. PT evaluated the patient and recommended skilled nursing care. The patient does not want to go to SNF, but plans to discuss with her family.       Review of Systems   Constitutional: Positive for fatigue. Negative for appetite change, chills, diaphoresis and fever.   HENT: Negative for congestion, ear pain, mouth sores, sore throat and trouble swallowing.    Eyes: Negative for pain and visual disturbance.   Respiratory: Negative for cough, chest tightness and shortness of breath.    Cardiovascular: Negative for chest pain, palpitations and leg swelling.   Gastrointestinal: Negative for abdominal pain, constipation and nausea.   Endocrine: Negative for cold intolerance, heat intolerance, polydipsia and polyuria.   Genitourinary: Negative for dysuria, frequency and hematuria.   Musculoskeletal: Negative for arthralgias, back pain, myalgias and neck pain.   Skin: Negative for pallor, rash and wound.   Allergic/Immunologic: Negative for environmental allergies and immunocompromised state.   Neurological: Negative for dizziness, seizures, syncope, weakness, numbness and headaches.        Positive for a fall.    Hematological: Negative for adenopathy. Does not bruise/bleed easily.   Psychiatric/Behavioral: Negative for agitation, confusion and sleep disturbance.     Objective:     Vital Signs (Most Recent):  Temp: 98.5 °F (36.9 °C) (06/15/18 1629)  Pulse: 71 (06/15/18 1629)  Resp: 18 (06/15/18 0844)  BP: (!) 172/74 (06/15/18 1629)  SpO2: 98 % (06/15/18 1629) Vital Signs (24h Range):  Temp:  [96.8 °F (36 °C)-98.9 °F (37.2 °C)] 98.5 °F (36.9  °C)  Pulse:  [71-88] 71  Resp:  [18-28] 18  SpO2:  [94 %-100 %] 98 %  BP: (114-193)/(55-87) 172/74     Weight: 55.2 kg (121 lb 11.1 oz)  Body mass index is 22.26 kg/m².  No intake or output data in the 24 hours ending 06/15/18 1639   Physical Exam   Constitutional: She is oriented to person, place, and time. She is cooperative. She appears ill. No distress.   HENT:   Head: Normocephalic and atraumatic.   Eyes: Conjunctivae are normal.   PERRL   Neck: Neck supple. No JVD present.   Cardiovascular: Normal rate, regular rhythm and normal heart sounds.    Pulmonary/Chest: Effort normal and breath sounds normal.   Abdominal: Soft. Bowel sounds are normal. She exhibits no distension. There is no tenderness.   Musculoskeletal: Normal range of motion. She exhibits edema (+1 bilateral lower extremity ).   Neurological: She is alert and oriented to person, place, and time.   Patient continue to display signs of mild baseline dementia.    Skin: Skin is warm and dry.   Psychiatric: She has a normal mood and affect. Her behavior is normal. Thought content normal. Cognition and memory are impaired.   Nursing note and vitals reviewed.      Significant Labs:   CBC:   Recent Labs  Lab 06/14/18  1152 06/15/18  0614   WBC 14.27* 7.48   HGB 9.5* 8.0*   HCT 29.3* 24.7*    205     CMP:   Recent Labs  Lab 06/14/18  1152 06/15/18  0613    141   K 3.2* 3.5    108   CO2 26 25   * 103   BUN 25* 25*   CREATININE 1.4 1.3   CALCIUM 8.8 8.4*   PROT 7.3 5.8*   ALBUMIN 3.6 2.9*   BILITOT 1.5* 1.1*   ALKPHOS 74 56   AST 21 20   ALT 6* <5*   ANIONGAP 11 8   EGFRNONAA 34* 37*     All pertinent labs within the past 24 hours have been reviewed.    Significant Imaging: I have reviewed all pertinent imaging results/findings within the past 24 hours.    Assessment/Plan:      * Syncope    -Likely due to a vasovagal episode.   -Decreased Coreg.   -Monitor orthostatics.           Encephalopathy    -Overall improved. However, patient  likely has a baseline dementia.   -Work up for acute metabolic cause is negative.    -Outpatient Neurology follow up recommended.           At risk for falls    -Bed alarm.   -Fall precautions.   -PT and OT consults.   -Home health.           Hypertensive urgency    -Improved.   -Coreg dose decreased due to syncope.   -Continue Norvasc.   -Hydralazine as needed.           Hypokalemia    Monitor and replace as needed.           Hypomagnesemia    Monitor and replace as needed.           DM II (diabetes mellitus, type II), controlled    -NISS.   -ADA diet.   -Hemoglobin A1C was 5.5 on 6/14/18.           CKD (chronic kidney disease), stage IV    -Creatinine at baseline.   -Appropriately dose medications and monitor.             VTE Risk Mitigation         Ordered     heparin (porcine) injection 5,000 Units  Every 8 hours      06/14/18 1622     IP VTE HIGH RISK PATIENT  Once      06/14/18 1348              CANDACE Chin  Department of Hospital Medicine   [facility]

## 2018-06-15 NOTE — ASSESSMENT & PLAN NOTE
-Overall improved. However, patient likely has a baseline dementia.   -Work up for acute metabolic cause is negative.    -Outpatient Neurology follow up recommended.

## 2018-06-15 NOTE — PLAN OF CARE
Problem: Patient Care Overview  Goal: Plan of Care Review  Outcome: Ongoing (interventions implemented as appropriate)  Patient resting in bed. No complaints of dizziness at this time noted, Daughter in law at bedside. 2D echo performed today and ultrasound of carotids. Family of patient spoke with  about placement when discharged. Chart check completed.

## 2018-06-15 NOTE — SUBJECTIVE & OBJECTIVE
Interval History: The patient had a syncope episode while having a bowel movement this morning. She is otherwise feeling well. PT evaluated the patient and recommended skilled nursing care. The patient does not want to go to SNF, but plans to discuss with her family.       Review of Systems   Constitutional: Positive for fatigue. Negative for appetite change, chills, diaphoresis and fever.   HENT: Negative for congestion, ear pain, mouth sores, sore throat and trouble swallowing.    Eyes: Negative for pain and visual disturbance.   Respiratory: Negative for cough, chest tightness and shortness of breath.    Cardiovascular: Negative for chest pain, palpitations and leg swelling.   Gastrointestinal: Negative for abdominal pain, constipation and nausea.   Endocrine: Negative for cold intolerance, heat intolerance, polydipsia and polyuria.   Genitourinary: Negative for dysuria, frequency and hematuria.   Musculoskeletal: Negative for arthralgias, back pain, myalgias and neck pain.   Skin: Negative for pallor, rash and wound.   Allergic/Immunologic: Negative for environmental allergies and immunocompromised state.   Neurological: Negative for dizziness, seizures, syncope, weakness, numbness and headaches.        Positive for a fall.    Hematological: Negative for adenopathy. Does not bruise/bleed easily.   Psychiatric/Behavioral: Negative for agitation, confusion and sleep disturbance.     Objective:     Vital Signs (Most Recent):  Temp: 98.5 °F (36.9 °C) (06/15/18 1629)  Pulse: 71 (06/15/18 1629)  Resp: 18 (06/15/18 0844)  BP: (!) 172/74 (06/15/18 1629)  SpO2: 98 % (06/15/18 1629) Vital Signs (24h Range):  Temp:  [96.8 °F (36 °C)-98.9 °F (37.2 °C)] 98.5 °F (36.9 °C)  Pulse:  [71-88] 71  Resp:  [18-28] 18  SpO2:  [94 %-100 %] 98 %  BP: (114-193)/(55-87) 172/74     Weight: 55.2 kg (121 lb 11.1 oz)  Body mass index is 22.26 kg/m².  No intake or output data in the 24 hours ending 06/15/18 1639   Physical Exam    Constitutional: She is oriented to person, place, and time. She is cooperative. She appears ill. No distress.   HENT:   Head: Normocephalic and atraumatic.   Eyes: Conjunctivae are normal.   PERRL   Neck: Neck supple. No JVD present.   Cardiovascular: Normal rate, regular rhythm and normal heart sounds.    Pulmonary/Chest: Effort normal and breath sounds normal.   Abdominal: Soft. Bowel sounds are normal. She exhibits no distension. There is no tenderness.   Musculoskeletal: Normal range of motion. She exhibits edema (+1 bilateral lower extremity ).   Neurological: She is alert and oriented to person, place, and time.   Patient continue to display signs of mild baseline dementia.    Skin: Skin is warm and dry.   Psychiatric: She has a normal mood and affect. Her behavior is normal. Thought content normal. Cognition and memory are impaired.   Nursing note and vitals reviewed.      Significant Labs:   CBC:   Recent Labs  Lab 06/14/18  1152 06/15/18  0614   WBC 14.27* 7.48   HGB 9.5* 8.0*   HCT 29.3* 24.7*    205     CMP:   Recent Labs  Lab 06/14/18  1152 06/15/18  0613    141   K 3.2* 3.5    108   CO2 26 25   * 103   BUN 25* 25*   CREATININE 1.4 1.3   CALCIUM 8.8 8.4*   PROT 7.3 5.8*   ALBUMIN 3.6 2.9*   BILITOT 1.5* 1.1*   ALKPHOS 74 56   AST 21 20   ALT 6* <5*   ANIONGAP 11 8   EGFRNONAA 34* 37*     All pertinent labs within the past 24 hours have been reviewed.    Significant Imaging: I have reviewed all pertinent imaging results/findings within the past 24 hours.

## 2018-06-15 NOTE — PT/OT/SLP EVAL
Physical Therapy Evaluation    Patient Name:  Brynn Augustine   MRN:  [id number]    Recommendations:     Discharge Recommendations:  nursing facility, skilled   Discharge Equipment Recommendations: none   Barriers to discharge: Decreased caregiver support    Assessment:     Brynn Augustine is a 87 y.o. female admitted with a medical diagnosis of Hypertensive urgency.  She presents with the following impairments/functional limitations:  weakness, impaired endurance, gait instability, decreased lower extremity function, impaired balance, decreased safety awareness, impaired cognition, impaired functional mobilty .    Rehab Prognosis:  GOOD; patient would benefit from acute skilled PT services to address these deficits and reach maximum level of function.      Recent Surgery: * No surgery found *      Plan:     During this hospitalization, patient to be seen   to address the above listed problems via gait training, therapeutic activities, therapeutic exercises  · Plan of Care Expires:  06/22/18   Plan of Care Reviewed with: patient, son    Subjective     Communicated with NURSE SHELLEY AND EPIC CHART REVIEW prior to session.  Patient found SUP IN BED WITH SON PRESENT upon PT entry to room, agreeable to evaluation.      Chief Complaint: NONE  Patient comments/goals: INC STRENGTH  Pain/Comfort:  · Pain Rating 1: 0/10  · Pain Rating Post-Intervention 1: 0/10    Patients cultural, spiritual, Oriental orthodox conflicts given the current situation:      Living Environment:  PT LIVES AT HOME ALONE AND HAS NO STEPS TO ENTER HOME  Prior to admission, patients level of function was MARICARMEN WITH RW AND DRESSES AND BATHES SELF. PT IS TAKE IN WC IN COMMUNITY BY SON.  Patient has the following equipment: cane, straight, walker, rolling, rollator, grab bar, wheelchair.  DME owned (not currently used): none.  Upon discharge, patient will have assistance from FAMILY.    Objective:     Patient found with: peripheral IV, telemetry, bed alarm     General  Precautions: Standard, fall   Orthopedic Precautions:N/A   Braces: N/A     Exams:  · RLE ROM: LIMITED  · RLE Strength: LIMITED  · LLE ROM: LIMITED  · LLE Strength: LIMITED    Functional Mobility:  · PT SUP>SIT EOB WITH SBA AND SCOOTED TO EOB WITH MIN A PT STOOD WITH RW AND MIN A FOR GT X 140' WITH MIN A. PT WITH POST LEAN AND REQUIRES MIN A FOR WT SHIFT ANT. PT RETURNED TO RM T/F TO CHAIR WITH MIN A. PT LEFT SEATED WITH SON PRESENT AND AWARE TO CALL FOR ASSIST UP OUT OF CHAIR.     AM-PAC 6 CLICK MOBILITY  Total Score:17     Patient left up in chair with call button in reach and SON present.    GOALS:    Physical Therapy Goals        Problem: Physical Therapy Goal    Goal Priority Disciplines Outcome Goal Variances Interventions   Physical Therapy Goal     PT/OT, PT      Description:  PT WILL BE SEEN FOR P.T. FOR A MIN OF 5 OUT OF 7 DAYS A WEEK  LT18  1. PT WILL COMPLETE BED MOBILITY MARICARMEN  2. PT WILL T/F TO CHAIR WITH RW AND CGA  3. PT WILL GT TRAIN X 150' WITH RW AND CGA  4. PT WILL COMPLETE B LE TE X 20 REPS                    History:     Past Medical History:   Diagnosis Date    Anemia     Arthritis     Back pain     Breast cancer 2008    right    Diabetes with neurologic complications     Glaucoma     Gout     Hyperlipidemia     Hypertension     Hypoglycemia 2014    Kidney disease, chronic, stage IV (GFR 15-29 ml/min)     Renal manifestation of secondary diabetes mellitus     Retinitis pigmentosa 2016    outside eye exam (No BDR)    Skin cancer     several       Past Surgical History:   Procedure Laterality Date    APPENDECTOMY  1964    incidental with choly    BREAST SURGERY Right 2008    excisional Bx =infiltrating ductal ca, low grade ;calcifications identified within benign and malignant ducts.    BREAST SURGERY Right 2008    sentinal node and lymoh node dissection    CHOLECYSTECTOMY  1964    EYE SURGERY Bilateral 2006    IOL ou ( ) and YAGOD     HYSTERECTOMY  1971    Moh's  procedure Left 04/25/2011    BCC, left chin; BSC on Lt upper lip, BCC lt side of nose (dR FELIZ)    SPINE SURGERY  1975    Lumbosacral disc       Clinical Decision Making:     History  Co-morbidities and personal factors that may impact the plan of care Examination  Body Structures and Functions, activity limitations and participation restrictions that may impact the plan of care Clinical Presentation   Decision Making/ Complexity Score   Co-morbidities:   [] Time since onset of injury / illness / exacerbation  [] Status of current condition  []Patient's cognitive status and safety concerns    [] Multiple Medical Problems (see med hx)  Personal Factors:   [] Patient's age  [] Prior Level of function   [] Patient's home situation (environment and family support)  [] Patient's level of motivation  [] Expected progression of patient      HISTORY:(criteria)    [] 39051 - no personal factors/history    [] 36377 - has 1-2 personal factor/comorbidity     [] 41787 - has >3 personal factor/comorbidity     Body Regions:  [] Objective examination findings  [] Head     []  Neck  [] Trunk   [] Upper Extremity  [] Lower Extremity    Body Systems:  [] For communication ability, affect, cognition, language, and learning style: the assessment of the ability to make needs known, consciousness, orientation (person, place, and time), expected emotional /behavioral responses, and learning preferences (eg, learning barriers, education  needs)  [] For the neuromuscular system: a general assessment of gross coordinated movement (eg, balance, gait, locomotion, transfers, and transitions) and motor function  (motor control and motor learning)  [] For the musculoskeletal system: the assessment of gross symmetry, gross range of motion, gross strength, height, and weight  [] For the integumentary system: the assessment of pliability(texture), presence of scar formation, skin color, and skin integrity  [] For  cardiovascular/pulmonary system: the assessment of heart rate, respiratory rate, blood pressure, and edema     Activity limitations:    [] Patient's cognitive status and saf ety concerns          [] Status of current condition      [] Weight bearing restriction  [] Cardiopulmunary Restriction    Participation Restrictions:   [] Goals and goal agreement with the patient     [] Rehab potential (prognosis) and probable outcome      Examination of Body System: (criteria)    [] 49920 - addressing 1-2 elements    [] 43037 - addressing a total of 3 or more elements     [] 31695 -  Addressing a total of 4 or more elements         Clinical Presentation: (criteria)  Choose one     On examination of body system using standardized tests and measures patient presents with (CHOOSE ONE) elements from any of the following: body structures and functions, activity limitations, and/or participation restrictions.  Leading to a clinical presentation that is considered (CHOOSE ONE)                              Clinical Decision Making  (Eval Complexity):  Choose One     Time Tracking:     PT Received On: 06/15/18  PT Start Time: 0805     PT Stop Time: 0830  PT Total Time (min): 25 min     Billable Minutes: Evaluation 15 and Gait Training 10      Janet Braxton, PT  06/15/2018

## 2018-06-15 NOTE — PLAN OF CARE
Problem: Patient Care Overview  Goal: Interdisciplinary Rounds/Family Conf  Outcome: Ongoing (interventions implemented as appropriate)  Pt stable. Plan of care reviewed with patient. Patient verbalized understanding. Bed low, wheels locked, bed alarm on, call light within reach. Patient instructed to call for assistance. Will continue to monitor.     Turning Pt every 2 hours.   Foot care provided, removed feces from between juan. Toes       Value Min Max   Temp 96.8 °F (36 °C) 98.5 °F (36.9 °C)   Pulse 74 88   Resp 18 30   BP: Systolic 114 224   BP: Diastolic 55 93   MAP (mmHg) 108 134   SpO2 96 % 100 %

## 2018-06-15 NOTE — PT/OT/SLP EVAL
Occupational Therapy   Evaluation    Name: Brynn Augustine  MRN: [id number]  Admitting Diagnosis:  Hypertensive urgency      Recommendations:     Discharge Recommendations: nursing facility, skilled  Discharge Equipment Recommendations:  none  Barriers to discharge:  Decreased caregiver support    History:     Occupational Profile:  Living Environment: LIVES ALONE IN ONE STORY HOUSE, NO STEPS TO ENTER  Previous level of function: INDEPENDENT-MOD I WITH SELF-CARE; PT'S FAMILY PREPARES ALL MEALS FOR PATIENT  Roles and Routines: MINIMAL ACTIVITIES  Equipment Owned:  cane, straight, walker, rolling, shower chair, grab bar, wheelchair  Assistance upon Discharge: PT'S FAMILY WORKS, UNABLE TO HAVE 24 HR CARE    Past Medical History:   Diagnosis Date    Anemia     Arthritis     Back pain     Breast cancer 2008    right    Diabetes with neurologic complications     Glaucoma     Gout     Hyperlipidemia     Hypertension     Hypoglycemia 12/6/2014    Kidney disease, chronic, stage IV (GFR 15-29 ml/min)     Renal manifestation of secondary diabetes mellitus     Retinitis pigmentosa 05/04/2016    outside eye exam (No BDR)    Skin cancer     several       Past Surgical History:   Procedure Laterality Date    APPENDECTOMY  1964    incidental with choly    BREAST SURGERY Right 08/13/2008    excisional Bx =infiltrating ductal ca, low grade ;calcifications identified within benign and malignant ducts.    BREAST SURGERY Right 09/09/2008    sentinal node and lymoh node dissection    CHOLECYSTECTOMY  1964    EYE SURGERY Bilateral 2006    IOL ou ( 2006) and YAGOD    HYSTERECTOMY  1971    Moh's  procedure Left 04/25/2011    BCC, left chin; BSC on Lt upper lip, BCC lt side of nose (dR FELIZ)    SPINE SURGERY  1975    Lumbosacral disc       Subjective     Chief Complaint: WEAKNESS  Patient/Family stated goals: RETURN TO PLOF  Communicated with: RN prior to session.  Pain/Comfort:  · Pain Rating 1: 0/10  · Pain Rating  Post-Intervention 1: 0/10    Patients cultural, spiritual, Gnosticism conflicts given the current situation:      Objective:     Patient found with: peripheral IV, bed alarm    General Precautions: Standard, fall   Orthopedic Precautions:N/A   Braces: N/A     Occupational Performance:    Bed Mobility:    · Patient completed Rolling/Turning to Left with  stand by assistance  · Patient completed Scooting/Bridging with contact guard assistance  · Patient completed Supine to Sit with stand by assistance    Functional Mobility/Transfers:  · Patient completed Sit <> Stand Transfer with minimum assistance  with  rolling walker   · Patient completed Bed <> Chair Transfer using Stand Pivot technique with minimum assistance with rolling walker  · Functional Mobility: PT AMBULATED WITH RW WITH R FOOT DRAG    Activities of Daily Living:  · LB Dressing: minimum assistance MIN A FOR SITTING BALANCE WHILE PATIENT DONNED BILATERAL SOCKS    Cognitive/Visual Perceptual:  Cognitive/Psychosocial Skills:  -       Oriented to: Person   -       Follows Commands/attention:Follows two-step commands  -       Memory: Impaired STM and Impaired LTM  -       Safety awareness/insight to disability: impaired     Physical Exam:  Balance:    -       POOR  Postural examination/scapula alignment:    -       Rounded shoulders  -       Forward head  Upper Extremity Range of Motion:     -       Right Upper Extremity: WFL  -       Left Upper Extremity: WFL  Upper Extremity Strength:    -       Right Upper Extremity: WFL  -       Left Upper Extremity: WFL    Patient left up in chair with all lines intact, call button in reach, NRUSING notified and SON present    AMPA 6 Click:  AMPAC Total Score: 18    Treatment & Education:  PT PARTICIPATED IN INITIAL OT EVALUATION TODAY TO ASSESS FOR CURRENT LEVEL OF FUNCTION. PLEASE SEE ABOVE FOR DETAILS. PT WILL BENEFIT FROM SKILLED OT SERVICES TO ADDRESS ABOVE DEFICITS TO INCREASE FUNCTIONAL  "INDEPENDENCE.  Education:    Assessment:     Brynn Augustine is a 87 y.o. female with a medical diagnosis of Hypertensive urgency.  She presents with DEBILITY, IMPAIRED ADLS, FUNCTIONAL MOBILITY AND IMPAIRED SAFETY AWARENESS.  Performance deficits affecting function are impaired endurance, impaired functional mobilty, impaired self care skills, weakness, impaired balance, impaired cognition, decreased lower extremity function, decreased upper extremity function, decreased coordination, decreased safety awareness, impaired coordination.      Rehab Prognosis:  FAIR; patient would benefit from acute skilled OT services to address these deficits and reach maximum level of function.         Clinical Decision Makin.  OT Mod:  "Pt evaluation falls under moderate complexity for evaluation coding due to identification of 3-5 performance deficits noted as stated above. Eval required Min/Mod assistance to complete on this date and detailed assessment(s) were utilized. Moreover, an expanded review of history and occupational profile obtained with additional review of cognitive, physical and psychosocial hx."     Plan:     Patient to be seen 3 x/week to address the above listed problems via self-care/home management, therapeutic activities, therapeutic exercises  · Plan of Care Expires: 18  · Plan of Care Reviewed with: patient, son    This Plan of care has been discussed with the patient who was involved in its development and understands and is in agreement with the identified goals and treatment plan    GOALS:    Occupational Therapy Goals        Problem: Occupational Therapy Goal    Goal Priority Disciplines Outcome Interventions   Occupational Therapy Goal     OT, PT/OT     Description:  Goals to be met by: 18     Patient will increase functional independence with ADLs by performing:    UE Dressing with Set-up Assistance.  LE Dressing with Stand-by Assistance.  Grooming while standing with Minimal " Assistance.  Toileting from toilet with Minimal Assistance for hygiene and clothing management.   Toilet transfer to toilet with Minimal Assistance.  Upper extremity exercise program x10 reps per handout, with assistance as needed.                      Time Tracking:     OT Date of Treatment: 06/15/18  OT Start Time: 0800  OT Stop Time: 0815  OT Total Time (min): 15 min    Billable Minutes:Evaluation 15    Jen Hendricks OT  6/15/2018

## 2018-06-15 NOTE — PROGRESS NOTES
Patient helped to restroom per myself and derrell charge nurse. Patient become unresponsive on toilet after having bowel movement. Rapid response called. Patient placed in bed. Rapid response team arrived (ICU nurse Marilin Moralez with [facility] medicine, Alley Avilez NP) patient became alert, oriented and talking after placed in bed.

## 2018-06-16 LAB
ALBUMIN SERPL BCP-MCNC: 3.1 G/DL
ALP SERPL-CCNC: 59 U/L
ALT SERPL W/O P-5'-P-CCNC: <5 U/L
ANION GAP SERPL CALC-SCNC: 9 MMOL/L
AST SERPL-CCNC: 19 U/L
BASOPHILS # BLD AUTO: 0.01 K/UL
BASOPHILS NFR BLD: 0.2 %
BILIRUB SERPL-MCNC: 0.8 MG/DL
BUN SERPL-MCNC: 27 MG/DL
CALCIUM SERPL-MCNC: 8.9 MG/DL
CHLORIDE SERPL-SCNC: 103 MMOL/L
CO2 SERPL-SCNC: 28 MMOL/L
CREAT SERPL-MCNC: 1.6 MG/DL
DIFFERENTIAL METHOD: ABNORMAL
EOSINOPHIL # BLD AUTO: 0.4 K/UL
EOSINOPHIL NFR BLD: 6.2 %
ERYTHROCYTE [DISTWIDTH] IN BLOOD BY AUTOMATED COUNT: 18.2 %
EST. GFR  (AFRICAN AMERICAN): 33 ML/MIN/1.73 M^2
EST. GFR  (NON AFRICAN AMERICAN): 29 ML/MIN/1.73 M^2
GLUCOSE SERPL-MCNC: 109 MG/DL
HCT VFR BLD AUTO: 25.7 %
HGB BLD-MCNC: 8.2 G/DL
LYMPHOCYTES # BLD AUTO: 2.1 K/UL
LYMPHOCYTES NFR BLD: 37.2 %
MAGNESIUM SERPL-MCNC: 1.8 MG/DL
MCH RBC QN AUTO: 32.2 PG
MCHC RBC AUTO-ENTMCNC: 31.9 G/DL
MCV RBC AUTO: 101 FL
MONOCYTES # BLD AUTO: 0.6 K/UL
MONOCYTES NFR BLD: 10.1 %
NEUTROPHILS # BLD AUTO: 2.6 K/UL
NEUTROPHILS NFR BLD: 46.3 %
PHOSPHATE SERPL-MCNC: 3.3 MG/DL
PLATELET # BLD AUTO: 207 K/UL
PMV BLD AUTO: 11 FL
POCT GLUCOSE: 101 MG/DL (ref 70–110)
POCT GLUCOSE: 106 MG/DL (ref 70–110)
POCT GLUCOSE: 111 MG/DL (ref 70–110)
POCT GLUCOSE: 125 MG/DL (ref 70–110)
POTASSIUM SERPL-SCNC: 3.6 MMOL/L
PROT SERPL-MCNC: 6.3 G/DL
RBC # BLD AUTO: 2.55 M/UL
SODIUM SERPL-SCNC: 140 MMOL/L
WBC # BLD AUTO: 5.65 K/UL

## 2018-06-16 PROCEDURE — 36415 COLL VENOUS BLD VENIPUNCTURE: CPT

## 2018-06-16 PROCEDURE — 25000003 PHARM REV CODE 250: Performed by: PHYSICIAN ASSISTANT

## 2018-06-16 PROCEDURE — 25000003 PHARM REV CODE 250: Performed by: FAMILY MEDICINE

## 2018-06-16 PROCEDURE — 97116 GAIT TRAINING THERAPY: CPT

## 2018-06-16 PROCEDURE — 84100 ASSAY OF PHOSPHORUS: CPT

## 2018-06-16 PROCEDURE — 83735 ASSAY OF MAGNESIUM: CPT

## 2018-06-16 PROCEDURE — 63600175 PHARM REV CODE 636 W HCPCS: Performed by: PHYSICIAN ASSISTANT

## 2018-06-16 PROCEDURE — 85025 COMPLETE CBC W/AUTO DIFF WBC: CPT

## 2018-06-16 PROCEDURE — 21400001 HC TELEMETRY ROOM

## 2018-06-16 PROCEDURE — 80053 COMPREHEN METABOLIC PANEL: CPT

## 2018-06-16 PROCEDURE — G0378 HOSPITAL OBSERVATION PER HR: HCPCS

## 2018-06-16 RX ORDER — PANTOPRAZOLE SODIUM 40 MG/1
40 TABLET, DELAYED RELEASE ORAL DAILY
Status: DISCONTINUED | OUTPATIENT
Start: 2018-06-16 | End: 2018-06-20 | Stop reason: HOSPADM

## 2018-06-16 RX ADMIN — PANTOPRAZOLE SODIUM 40 MG: 40 TABLET, DELAYED RELEASE ORAL at 11:06

## 2018-06-16 RX ADMIN — ALLOPURINOL 200 MG: 100 TABLET ORAL at 08:06

## 2018-06-16 RX ADMIN — CARVEDILOL 6.25 MG: 6.25 TABLET, FILM COATED ORAL at 08:06

## 2018-06-16 RX ADMIN — HEPARIN SODIUM 5000 UNITS: 5000 INJECTION, SOLUTION INTRAVENOUS; SUBCUTANEOUS at 09:06

## 2018-06-16 RX ADMIN — HEPARIN SODIUM 5000 UNITS: 5000 INJECTION, SOLUTION INTRAVENOUS; SUBCUTANEOUS at 02:06

## 2018-06-16 RX ADMIN — CARVEDILOL 6.25 MG: 6.25 TABLET, FILM COATED ORAL at 09:06

## 2018-06-16 RX ADMIN — HEPARIN SODIUM 5000 UNITS: 5000 INJECTION, SOLUTION INTRAVENOUS; SUBCUTANEOUS at 06:06

## 2018-06-16 RX ADMIN — MAGNESIUM OXIDE TAB 400 MG (241.3 MG ELEMENTAL MG) 400 MG: 400 (241.3 MG) TAB at 08:06

## 2018-06-16 RX ADMIN — AMLODIPINE BESYLATE 5 MG: 5 TABLET ORAL at 08:06

## 2018-06-16 RX ADMIN — MAGNESIUM OXIDE TAB 400 MG (241.3 MG ELEMENTAL MG) 400 MG: 400 (241.3 MG) TAB at 09:06

## 2018-06-16 NOTE — PROGRESS NOTES
[facility] Hospital Medicine  Progress Note    Patient Name: Brynn Augustine  MRN: [id number]  Patient Class: OP- Observation   Admission Date: 6/14/2018  Length of Stay: 0 days  Attending Physician: Joseph Miranda MD  Primary Care Provider: Gabby Carrera MD        Subjective:     Principal Problem:Syncope    HPI:  Brynn Augustine is an 87 year old female with diabetes mellitus type II, hypertension and stage IV chronic kidney disease who presents to the ED with reports of a fall at home. The patient's son reports that he found her laying in her bath tub this morning at 7 AM. He suspects she fell into the tub while attempting to use the restroom during the night. The patient reports no complaints other than feeling fatigued. She denies pain. She is at her normal baseline health and does a history of chronic vision loss. She is oriented to person and place but does not time. At the time of exam, she is unable to recall common details such as her birthday and who is president. However, her son reports that she was able to recall these details earlier in the day. He also reports that she does not have a history of memory issues or a diagnosis of dementia. She lives alone, but he checks on her daily. In the ED, the patient had an increased CPK of 419, CPK MB of 22.0, and BNP of 1,313. Code status was discussed. She is a DNR. Her three children are her surrogate medical decision makers.       Hospital Course:  The patient was placed in Observation due to reports of being found in her bathtub after a fall. Upon arrival, she was found to have a hypertensive urgency, mild encephalopathy, elevated CPK, hypokalemia and hypomagnesemia. The patient was observed overnight without incident. However, this morning, while on the toilet having a bowel movement, she had a syncopal episode. The patient was further evaluated with orthostatic vital signs, 2D echo and carotid US. There were no significant findings. Her  Coreg dose was decreased and her Lasix was held. Physical therapy evaluated the patient and recommended skilled nursing care. This was discussed with the patient and family. Patient and family was provided SNF listing for in [facility].       Interval History: No acute events overnight. Awaiting placement.     Review of Systems   Constitutional: Positive for fatigue. Negative for appetite change, chills, diaphoresis and fever.   HENT: Negative for congestion, ear pain, mouth sores, sore throat and trouble swallowing.    Eyes: Negative for pain and visual disturbance.   Respiratory: Negative for cough, chest tightness and shortness of breath.    Cardiovascular: Negative for chest pain, palpitations and leg swelling.   Gastrointestinal: Negative for abdominal pain, constipation and nausea.   Endocrine: Negative for cold intolerance, heat intolerance, polydipsia and polyuria.   Genitourinary: Negative for dysuria, frequency and hematuria.   Musculoskeletal: Negative for arthralgias, back pain, myalgias and neck pain.   Skin: Negative for pallor, rash and wound.   Allergic/Immunologic: Negative for environmental allergies and immunocompromised state.   Neurological: Negative for dizziness, seizures, syncope, weakness, numbness and headaches.        Positive for a fall.    Hematological: Negative for adenopathy. Does not bruise/bleed easily.   Psychiatric/Behavioral: Negative for agitation, confusion and sleep disturbance.     Objective:     Vital Signs (Most Recent):  Temp: 98.4 °F (36.9 °C) (06/16/18 1323)  Pulse: 71 (06/16/18 1323)  Resp: 16 (06/16/18 0739)  BP: 138/63 (06/16/18 1323)  SpO2: 100 % (06/16/18 1323) Vital Signs (24h Range):  Temp:  [97.4 °F (36.3 °C)-98.5 °F (36.9 °C)] 98.4 °F (36.9 °C)  Pulse:  [69-97] 71  Resp:  [16-18] 16  SpO2:  [96 %-100 %] 100 %  BP: (134-191)/(11-83) 138/63     Weight: 55.2 kg (121 lb 11.1 oz)  Body mass index is 22.26 kg/m².  No intake or output data in the 24  hours ending 06/16/18 1603   Physical Exam   Constitutional: She is oriented to person, place, and time. She is cooperative. She appears ill. No distress.   HENT:   Head: Normocephalic and atraumatic.   Eyes: Conjunctivae are normal.   PERRL   Neck: Neck supple. No JVD present.   Cardiovascular: Normal rate, regular rhythm and normal heart sounds.    Pulmonary/Chest: Effort normal and breath sounds normal.   Abdominal: Soft. Bowel sounds are normal. She exhibits no distension. There is no tenderness.   Musculoskeletal: Normal range of motion. She exhibits edema (+1 bilateral lower extremity ).   Neurological: She is alert and oriented to person, place, and time.   Patient continue to display signs of mild baseline dementia.    Skin: Skin is warm and dry.   Psychiatric: She has a normal mood and affect. Her behavior is normal. Thought content normal. Cognition and memory are impaired.   Nursing note and vitals reviewed.      Significant Labs:   BMP:   Recent Labs  Lab 06/16/18  0542         K 3.6      CO2 28   BUN 27*   CREATININE 1.6*   CALCIUM 8.9   MG 1.8     CBC:   Recent Labs  Lab 06/15/18  0614 06/16/18  0542   WBC 7.48 5.65   HGB 8.0* 8.2*   HCT 24.7* 25.7*    207     CMP:   Recent Labs  Lab 06/15/18  0613 06/16/18  0542    140   K 3.5 3.6    103   CO2 25 28    109   BUN 25* 27*   CREATININE 1.3 1.6*   CALCIUM 8.4* 8.9   PROT 5.8* 6.3   ALBUMIN 2.9* 3.1*   BILITOT 1.1* 0.8   ALKPHOS 56 59   AST 20 19   ALT <5* <5*   ANIONGAP 8 9   EGFRNONAA 37* 29*       Significant Imaging:   Imaging Results          X-Ray Chest 1 View (Final result)  Result time 06/14/18 11:31:54    Final result by ISIDORO Payne Sr., MD (06/14/18 11:31:54)                 Impression:      1. There has been interval opacification of the base of the left hemithorax.  This is characteristic of a small left pleural effusion.  2. There is no acute pulmonary process visualized.  3. There is a mild  amount of dextroconvex scoliosis of the thoracic spine.  .      Electronically signed by: Jonathon Payne MD  Date:    06/14/2018  Time:    11:31             Narrative:    EXAMINATION:  XR CHEST 1 VIEW    CLINICAL HISTORY:  weakness;    COMPARISON:  12/06/2014    FINDINGS:  The size of the heart is prominent.  There is no acute pulmonary process visualized.  There are calcific densities projected over the right breast and right hemithorax.  There has been interval opacification of the base of the left hemithorax.  There is no pneumothorax.  The right costophrenic angle is sharp.  There is a mild amount of dextroconvex scoliosis of the thoracic spine.                               CT Head Without Contrast (Final result)  Result time 06/14/18 11:28:56    Final result by ISIDORO Payne Sr., MD (06/14/18 11:28:56)                 Impression:      1. There is no evidence of an acute ischemic event. There are moderate bilateral periventricular ischemic white matter changes.  2. There is no acute appearing intracranial hemorrhage.  All CT scans at this facility use dose modulation, iterative reconstruction, and/or weight base dosing when appropriate to reduce radiation dose when appropriate to reduce radiation dose to as low as reasonably achievable.      Electronically signed by: Jonathon Payne MD  Date:    06/14/2018  Time:    11:28             Narrative:    EXAMINATION:  CT HEAD WITHOUT CONTRAST    CLINICAL HISTORY:  WeaknessDizziness;    TECHNIQUE:  Standard brain CT protocol without IV contrast was performed.    COMPARISON:  06/14/2017    FINDINGS:  There is no evidence of an acute ischemic event.  There are moderate bilateral periventricular ischemic white matter changes.  There is no acute appearing intracranial hemorrhage.  There is no calvarial fracture.  The visualized portion of the paranasal sinuses is clear.                              Assessment/Plan:      * Syncope    -Likely due to a vasovagal episode?  Orthostatics   -+orthostatis   -Decreased Coreg.   -Monitor orthostatics qshift   -Improved   -Chilo Carotid US revealed 20-50% stenosis within the right internal carotid artery and 50-59% stenosis within the left internal carotid artery.    -Statin added           Encephalopathy    -Overall improved. However, patient likely has a baseline dementia.   -Work up for acute metabolic cause is negative.    -Outpatient Neurology follow up recommended.           DM II (diabetes mellitus, type II), controlled    -NISS.   -ADA diet.   -Hemoglobin A1C was 5.5 on 6/14/18.           Hypomagnesemia    Monitor and replace as needed.   Resolved         Hypokalemia    Monitor and replace as needed.   Resolved         At risk for falls    -Bed alarm.   -Fall precautions.   -PT and OT recommends skilled nursing             Hypertensive urgency    -Improved.   -Coreg dose decreased due to syncope.   -Continue Norvasc.   -Hydralazine as needed.           CKD (chronic kidney disease), stage IV    -Stable   -Baseline Creatinine 1.8  -Appropriately dose medications and monitor.             VTE Risk Mitigation         Ordered     heparin (porcine) injection 5,000 Units  Every 8 hours      06/14/18 1622     IP VTE HIGH RISK PATIENT  Once      06/14/18 1348              Violetta Barrios NP  Department of Hospital Medicine   [facility]

## 2018-06-16 NOTE — PLAN OF CARE
Problem: Patient Care Overview  Goal: Interdisciplinary Rounds/Family Conf  Outcome: Ongoing (interventions implemented as appropriate)  Pt stable. Plan of care reviewed with patient. Patient verbalized understanding. Bed low, wheels locked, bed alarm on, call light within reach. Patient instructed to call for assistance. Will continue to monitor.     Turning Pt every 2 hours.   Blanchable redness to sacrum. Skin barrier applied and dressing placed. Wound care consult placed.   Confused of situation and place but easily oriented.   Weakness reported with exertion.    Value Min Max   Temp 97.6 °F (36.4 °C) 98.9 °F (37.2 °C)   Pulse 69 97   Resp 16 20   BP: Systolic 134 191   BP: Diastolic 11 87   MAP (mmHg) 84 119   SpO2 94 % 99 %

## 2018-06-16 NOTE — PT/OT/SLP PROGRESS
Physical Therapy Treatment    Patient Name:  Brynn Augustine   MRN:  [id number]    Recommendations:     Discharge Recommendations:  nursing facility, skilled   Discharge Equipment Recommendations: none   Barriers to discharge: Decreased caregiver support    Assessment:     Brynn Augustine is a 87 y.o. female admitted with a medical diagnosis of Syncope.  She presents with the following impairments/functional limitations:  weakness, impaired endurance, impaired self care skills, gait instability, visual deficits, impaired functional mobilty, impaired balance, decreased safety awareness .    Pt continues to require assistance for all mobility for safety. Limited by cognition, but demonstrates good potential to make functional gains.     Rehab Prognosis:  Good  ; patient would benefit from acute skilled PT services to address these deficits and reach maximum level of function.      Recent Surgery: * No surgery found *      Plan:     During this hospitalization, patient to be seen  (pt will be seen a minimum of 5-+7 x weej ) to address the above listed problems via gait training, therapeutic activities, therapeutic exercises  · Plan of Care Expires:  06/22/18   Plan of Care Reviewed with: patient    Subjective     Communicated with epic and nurse prior to session.  Patient found in bed with bed alarm on  upon PT entry to room, agreeable to treatment.      Chief Complaint: none  Patient comments/goals: none stated (mild confusion)   Pain/Comfort:  · Pain Rating 1: 0/10    Patients cultural, spiritual, Tenriism conflicts given the current situation:      Objective:     Patient found with: telemetry, peripheral IV (bed alarm)     General Precautions: Standard, fall (decreased vision)   Orthopedic Precautions:N/A   Braces:       Functional Mobility:  · Bed Mobility:     · Rolling Left:  contact guard assistance  · Bridging: supervision  · Supine to Sit: contact guard assistance  · Sit to Supine: not tested as pt sat up in the  chair with her son in the room.  · Transfers:     · Sit to Stand:  contact guard assistance with no AD  · Bed to Chair: minimum assistance with  rolling walker  using  Step Transfer  · Gait: 150 feet in the wise way with Min a for balance and safety. Had one LOB when making a 180 degree turn. Assistance required to prevent falling. NO dizziness noted.       AM-PAC 6 CLICK MOBILITY  Turning over in bed (including adjusting bedclothes, sheets and blankets)?: 4  Sitting down on and standing up from a chair with arms (e.g., wheelchair, bedside commode, etc.): 3  Moving from lying on back to sitting on the side of the bed?: 3  Moving to and from a bed to a chair (including a wheelchair)?: 3  Need to walk in hospital room?: 3  Climbing 3-5 steps with a railing?: 1  Total Score: 17       Therapeutic Activities and Exercises:   Sat in the bedside chair and worked on brushing teeth and hair. Required assistance.     Patient left up in chair with all lines intact, call button in reach, nurse and tech notified and son present. Instructed son that he can not leave his mom up in the chair unattended due to fall risk. He understands to call the  prior to leaving so that his mom can be assisted back to bed. ####    GOALS:    Physical Therapy Goals        Problem: Physical Therapy Goal    Goal Priority Disciplines Outcome Goal Variances Interventions   Physical Therapy Goal     PT/OT, PT      Description:  PT WILL BE SEEN FOR P.T. FOR A MIN OF 5 OUT OF 7 DAYS A WEEK  LT18  1. PT WILL COMPLETE BED MOBILITY MARICARMEN  2. PT WILL T/F TO CHAIR WITH RW AND CGA  3. PT WILL GT TRAIN X 150' WITH RW AND CGA  4. PT WILL COMPLETE B LE TE X 20 REPS                    Time Tracking:     PT Received On: 18  PT Start Time: 1120     PT Stop Time: 1135  PT Total Time (min): 15 min     Billable Minutes: Gait Training 15 and Therapeutic Activity 0    Treatment Type: Treatment  PT/PTA: PTA     PTA Visit Number: 1     Lesley  Mick, PTA  06/16/2018

## 2018-06-16 NOTE — ASSESSMENT & PLAN NOTE
-Likely due to a vasovagal episode? Orthostatics   -+orthostatis   -Decreased Coreg.   -Monitor orthostatics qshift   -Improved   -Chilo Carotid US revealed 20-50% stenosis within the right internal carotid artery and 50-59% stenosis within the left internal carotid artery.    -Statin added

## 2018-06-16 NOTE — PLAN OF CARE
Problem: Patient Care Overview  Goal: Plan of Care Review  Outcome: Ongoing (interventions implemented as appropriate)  Fall precautions maintained, pt free from falls/injuries  PT repositions and ambulates w/ assist  Pt has no c/o pain  Oriented to only person  POC and medications reviewed with pt, pt verbalized understanding.  Side rails x 2 up, bed locked and low.  No signs/symptoms of acute distress.  Chart check done. Will cont to monitor.

## 2018-06-16 NOTE — SUBJECTIVE & OBJECTIVE
Interval History: No acute events overnight. Awaiting placement.     Review of Systems   Constitutional: Positive for fatigue. Negative for appetite change, chills, diaphoresis and fever.   HENT: Negative for congestion, ear pain, mouth sores, sore throat and trouble swallowing.    Eyes: Negative for pain and visual disturbance.   Respiratory: Negative for cough, chest tightness and shortness of breath.    Cardiovascular: Negative for chest pain, palpitations and leg swelling.   Gastrointestinal: Negative for abdominal pain, constipation and nausea.   Endocrine: Negative for cold intolerance, heat intolerance, polydipsia and polyuria.   Genitourinary: Negative for dysuria, frequency and hematuria.   Musculoskeletal: Negative for arthralgias, back pain, myalgias and neck pain.   Skin: Negative for pallor, rash and wound.   Allergic/Immunologic: Negative for environmental allergies and immunocompromised state.   Neurological: Negative for dizziness, seizures, syncope, weakness, numbness and headaches.        Positive for a fall.    Hematological: Negative for adenopathy. Does not bruise/bleed easily.   Psychiatric/Behavioral: Negative for agitation, confusion and sleep disturbance.     Objective:     Vital Signs (Most Recent):  Temp: 98.4 °F (36.9 °C) (06/16/18 1323)  Pulse: 71 (06/16/18 1323)  Resp: 16 (06/16/18 0739)  BP: 138/63 (06/16/18 1323)  SpO2: 100 % (06/16/18 1323) Vital Signs (24h Range):  Temp:  [97.4 °F (36.3 °C)-98.5 °F (36.9 °C)] 98.4 °F (36.9 °C)  Pulse:  [69-97] 71  Resp:  [16-18] 16  SpO2:  [96 %-100 %] 100 %  BP: (134-191)/(11-83) 138/63     Weight: 55.2 kg (121 lb 11.1 oz)  Body mass index is 22.26 kg/m².  No intake or output data in the 24 hours ending 06/16/18 1603   Physical Exam   Constitutional: She is oriented to person, place, and time. She is cooperative. She appears ill. No distress.   HENT:   Head: Normocephalic and atraumatic.   Eyes: Conjunctivae are normal.   PERRL   Neck: Neck supple.  No JVD present.   Cardiovascular: Normal rate, regular rhythm and normal heart sounds.    Pulmonary/Chest: Effort normal and breath sounds normal.   Abdominal: Soft. Bowel sounds are normal. She exhibits no distension. There is no tenderness.   Musculoskeletal: Normal range of motion. She exhibits edema (+1 bilateral lower extremity ).   Neurological: She is alert and oriented to person, place, and time.   Patient continue to display signs of mild baseline dementia.    Skin: Skin is warm and dry.   Psychiatric: She has a normal mood and affect. Her behavior is normal. Thought content normal. Cognition and memory are impaired.   Nursing note and vitals reviewed.      Significant Labs:   BMP:   Recent Labs  Lab 06/16/18  0542         K 3.6      CO2 28   BUN 27*   CREATININE 1.6*   CALCIUM 8.9   MG 1.8     CBC:   Recent Labs  Lab 06/15/18  0614 06/16/18  0542   WBC 7.48 5.65   HGB 8.0* 8.2*   HCT 24.7* 25.7*    207     CMP:   Recent Labs  Lab 06/15/18  0613 06/16/18  0542    140   K 3.5 3.6    103   CO2 25 28    109   BUN 25* 27*   CREATININE 1.3 1.6*   CALCIUM 8.4* 8.9   PROT 5.8* 6.3   ALBUMIN 2.9* 3.1*   BILITOT 1.1* 0.8   ALKPHOS 56 59   AST 20 19   ALT <5* <5*   ANIONGAP 8 9   EGFRNONAA 37* 29*       Significant Imaging:   Imaging Results          X-Ray Chest 1 View (Final result)  Result time 06/14/18 11:31:54    Final result by ISIDORO Payne Sr., MD (06/14/18 11:31:54)                 Impression:      1. There has been interval opacification of the base of the left hemithorax.  This is characteristic of a small left pleural effusion.  2. There is no acute pulmonary process visualized.  3. There is a mild amount of dextroconvex scoliosis of the thoracic spine.  .      Electronically signed by: Jonathon Payne MD  Date:    06/14/2018  Time:    11:31             Narrative:    EXAMINATION:  XR CHEST 1 VIEW    CLINICAL  HISTORY:  weakness;    COMPARISON:  12/06/2014    FINDINGS:  The size of the heart is prominent.  There is no acute pulmonary process visualized.  There are calcific densities projected over the right breast and right hemithorax.  There has been interval opacification of the base of the left hemithorax.  There is no pneumothorax.  The right costophrenic angle is sharp.  There is a mild amount of dextroconvex scoliosis of the thoracic spine.                               CT Head Without Contrast (Final result)  Result time 06/14/18 11:28:56    Final result by ISIDORO Payne Sr., MD (06/14/18 11:28:56)                 Impression:      1. There is no evidence of an acute ischemic event. There are moderate bilateral periventricular ischemic white matter changes.  2. There is no acute appearing intracranial hemorrhage.  All CT scans at this facility use dose modulation, iterative reconstruction, and/or weight base dosing when appropriate to reduce radiation dose when appropriate to reduce radiation dose to as low as reasonably achievable.      Electronically signed by: Jonathon Payne MD  Date:    06/14/2018  Time:    11:28             Narrative:    EXAMINATION:  CT HEAD WITHOUT CONTRAST    CLINICAL HISTORY:  WeaknessDizziness;    TECHNIQUE:  Standard brain CT protocol without IV contrast was performed.    COMPARISON:  06/14/2017    FINDINGS:  There is no evidence of an acute ischemic event.  There are moderate bilateral periventricular ischemic white matter changes.  There is no acute appearing intracranial hemorrhage.  There is no calvarial fracture.  The visualized portion of the paranasal sinuses is clear.

## 2018-06-16 NOTE — PLAN OF CARE
Consult received for home health.  Met with patient and family at the bedside.  PT recommendation was SNF.  Patient was previously provided with a SNF listing but wanted in network [place] facilities.  CM provided in network listing of facilities in [facility].  Family will review and contact CM with decision.

## 2018-06-16 NOTE — PLAN OF CARE
Problem: Patient Care Overview  Goal: Plan of Care Review  Outcome: Ongoing (interventions implemented as appropriate)  Pt required CGA with bed mobility for safety. Ambulated in the wise with a RW ~140 feet. Mild unsteadiness and a Loss of balance with turning.

## 2018-06-17 LAB
ALBUMIN SERPL BCP-MCNC: 3.1 G/DL
ALP SERPL-CCNC: 58 U/L
ALT SERPL W/O P-5'-P-CCNC: <5 U/L
ANION GAP SERPL CALC-SCNC: 8 MMOL/L
AST SERPL-CCNC: 16 U/L
BASOPHILS # BLD AUTO: 0.02 K/UL
BASOPHILS NFR BLD: 0.3 %
BILIRUB SERPL-MCNC: 0.8 MG/DL
BUN SERPL-MCNC: 37 MG/DL
CALCIUM SERPL-MCNC: 9 MG/DL
CHLORIDE SERPL-SCNC: 102 MMOL/L
CO2 SERPL-SCNC: 28 MMOL/L
CREAT SERPL-MCNC: 1.9 MG/DL
DIFFERENTIAL METHOD: ABNORMAL
EOSINOPHIL # BLD AUTO: 0.4 K/UL
EOSINOPHIL NFR BLD: 5.8 %
ERYTHROCYTE [DISTWIDTH] IN BLOOD BY AUTOMATED COUNT: 18.6 %
EST. GFR  (AFRICAN AMERICAN): 27 ML/MIN/1.73 M^2
EST. GFR  (NON AFRICAN AMERICAN): 23 ML/MIN/1.73 M^2
GLUCOSE SERPL-MCNC: 116 MG/DL
HCT VFR BLD AUTO: 26.1 %
HGB BLD-MCNC: 8.2 G/DL
LYMPHOCYTES # BLD AUTO: 2.6 K/UL
LYMPHOCYTES NFR BLD: 36.3 %
MAGNESIUM SERPL-MCNC: 2.2 MG/DL
MCH RBC QN AUTO: 31.5 PG
MCHC RBC AUTO-ENTMCNC: 31.4 G/DL
MCV RBC AUTO: 100 FL
MONOCYTES # BLD AUTO: 0.6 K/UL
MONOCYTES NFR BLD: 8.1 %
NEUTROPHILS # BLD AUTO: 3.5 K/UL
NEUTROPHILS NFR BLD: 49.5 %
PHOSPHATE SERPL-MCNC: 3.5 MG/DL
PLATELET # BLD AUTO: 221 K/UL
PMV BLD AUTO: 11.5 FL
POCT GLUCOSE: 109 MG/DL (ref 70–110)
POCT GLUCOSE: 112 MG/DL (ref 70–110)
POCT GLUCOSE: 118 MG/DL (ref 70–110)
POCT GLUCOSE: 128 MG/DL (ref 70–110)
POTASSIUM SERPL-SCNC: 3.9 MMOL/L
PROT SERPL-MCNC: 6.4 G/DL
RBC # BLD AUTO: 2.6 M/UL
SODIUM SERPL-SCNC: 138 MMOL/L
WBC # BLD AUTO: 7.06 K/UL

## 2018-06-17 PROCEDURE — 85025 COMPLETE CBC W/AUTO DIFF WBC: CPT

## 2018-06-17 PROCEDURE — 97110 THERAPEUTIC EXERCISES: CPT

## 2018-06-17 PROCEDURE — 97116 GAIT TRAINING THERAPY: CPT

## 2018-06-17 PROCEDURE — 84100 ASSAY OF PHOSPHORUS: CPT

## 2018-06-17 PROCEDURE — 83735 ASSAY OF MAGNESIUM: CPT

## 2018-06-17 PROCEDURE — 25000003 PHARM REV CODE 250: Performed by: NURSE PRACTITIONER

## 2018-06-17 PROCEDURE — 63600175 PHARM REV CODE 636 W HCPCS: Performed by: PHYSICIAN ASSISTANT

## 2018-06-17 PROCEDURE — 21400001 HC TELEMETRY ROOM

## 2018-06-17 PROCEDURE — 36415 COLL VENOUS BLD VENIPUNCTURE: CPT

## 2018-06-17 PROCEDURE — 80053 COMPREHEN METABOLIC PANEL: CPT

## 2018-06-17 PROCEDURE — 25000003 PHARM REV CODE 250: Performed by: PHYSICIAN ASSISTANT

## 2018-06-17 PROCEDURE — 25000003 PHARM REV CODE 250: Performed by: FAMILY MEDICINE

## 2018-06-17 RX ORDER — SODIUM CHLORIDE 9 MG/ML
INJECTION, SOLUTION INTRAVENOUS CONTINUOUS
Status: DISCONTINUED | OUTPATIENT
Start: 2018-06-17 | End: 2018-06-19

## 2018-06-17 RX ADMIN — CARVEDILOL 6.25 MG: 6.25 TABLET, FILM COATED ORAL at 09:06

## 2018-06-17 RX ADMIN — HEPARIN SODIUM 5000 UNITS: 5000 INJECTION, SOLUTION INTRAVENOUS; SUBCUTANEOUS at 02:06

## 2018-06-17 RX ADMIN — SODIUM CHLORIDE: 0.9 INJECTION, SOLUTION INTRAVENOUS at 10:06

## 2018-06-17 RX ADMIN — HEPARIN SODIUM 5000 UNITS: 5000 INJECTION, SOLUTION INTRAVENOUS; SUBCUTANEOUS at 09:06

## 2018-06-17 RX ADMIN — MAGNESIUM OXIDE TAB 400 MG (241.3 MG ELEMENTAL MG) 400 MG: 400 (241.3 MG) TAB at 09:06

## 2018-06-17 RX ADMIN — ALLOPURINOL 200 MG: 100 TABLET ORAL at 09:06

## 2018-06-17 RX ADMIN — HEPARIN SODIUM 5000 UNITS: 5000 INJECTION, SOLUTION INTRAVENOUS; SUBCUTANEOUS at 05:06

## 2018-06-17 RX ADMIN — AMLODIPINE BESYLATE 5 MG: 5 TABLET ORAL at 09:06

## 2018-06-17 RX ADMIN — PANTOPRAZOLE SODIUM 40 MG: 40 TABLET, DELAYED RELEASE ORAL at 09:06

## 2018-06-17 NOTE — PLAN OF CARE
Problem: Patient Care Overview  Goal: Plan of Care Review  Outcome: Outcome(s) achieved Date Met: 06/17/18  Fall precautions maintained. Pt free from falls/injuries.  Patient denies any complaints of pain.   Antibiotics given as prescribed.  Ambulates and repositions with assistance.   Telemetry monitor running normal sinus rhythm.  Accucheck done, coverage given as needed.  Plan of care and medications discussed with patient.  Patient verbalized understanding.  Bed locked and low, call bell within reach.  Chart check done. Will continue to monitor.]\

## 2018-06-17 NOTE — PLAN OF CARE
Problem: Patient Care Overview  Goal: Plan of Care Review  Outcome: Ongoing (interventions implemented as appropriate)  Pt required CGA with bed mobility. Ambulated with a RW and assistance , 150 feet.

## 2018-06-17 NOTE — PROGRESS NOTES
[facility] Hospital Medicine  Progress Note    Patient Name: Brynn Augustine  MRN: [id number]  Patient Class: OP- Observation   Admission Date: 6/14/2018  Length of Stay: 0 days  Attending Physician: Adonis Meade MD  Primary Care Provider: Gabby Carrera MD        Subjective:     Principal Problem:Syncope    HPI:  Brynn Augustine is an 87 year old female with diabetes mellitus type II, hypertension and stage IV chronic kidney disease who presents to the ED with reports of a fall at home. The patient's son reports that he found her laying in her bath tub this morning at 7 AM. He suspects she fell into the tub while attempting to use the restroom during the night. The patient reports no complaints other than feeling fatigued. She denies pain. She is at her normal baseline health and does a history of chronic vision loss. She is oriented to person and place but does not time. At the time of exam, she is unable to recall common details such as her birthday and who is president. However, her son reports that she was able to recall these details earlier in the day. He also reports that she does not have a history of memory issues or a diagnosis of dementia. She lives alone, but he checks on her daily. In the ED, the patient had an increased CPK of 419, CPK MB of 22.0, and BNP of 1,313. Code status was discussed. She is a DNR. Her three children are her surrogate medical decision makers.       Hospital Course:  The patient was placed in Observation due to reports of being found in her bathtub after a fall. Upon arrival, she was found to have a hypertensive urgency, mild encephalopathy, elevated CPK, hypokalemia and hypomagnesemia. The patient was observed overnight without incident. However, this morning, while on the toilet having a bowel movement, she had a syncopal episode. The patient was further evaluated with orthostatic vital signs, 2D echo and carotid US. There were no significant findings. Her  Coreg dose was decreased and her Lasix was held. Physical therapy evaluated the patient and recommended skilled nursing care. This was discussed with the patient and family. Patient and family was provided SNF listing for in [facility]. 2D echo revealed Normal EF 60-65%, DD, and pulmonary HTN. Bilateral carotid US: 20-50% stenosis within the right internal carotid artery and 50-59% stenosis within the left internal carotid artery. Awaiting on placement.     Interval History: No acute events overnight. Awaiting placement.     Review of Systems   Constitutional: Positive for fatigue. Negative for appetite change, chills, diaphoresis and fever.   HENT: Negative for congestion, ear pain, mouth sores, sore throat and trouble swallowing.    Eyes: Negative for pain and visual disturbance.   Respiratory: Negative for cough, chest tightness and shortness of breath.    Cardiovascular: Negative for chest pain, palpitations and leg swelling.   Gastrointestinal: Negative for abdominal pain, constipation and nausea.   Endocrine: Negative for cold intolerance, heat intolerance, polydipsia and polyuria.   Genitourinary: Negative for dysuria, frequency and hematuria.   Musculoskeletal: Negative for arthralgias, back pain, myalgias and neck pain.   Skin: Negative for pallor, rash and wound.   Allergic/Immunologic: Negative for environmental allergies and immunocompromised state.   Neurological: Negative for dizziness, seizures, syncope, weakness, numbness and headaches.        Positive for a fall.    Hematological: Negative for adenopathy. Does not bruise/bleed easily.   Psychiatric/Behavioral: Negative for agitation, confusion and sleep disturbance.     Objective:     Vital Signs (Most Recent):  Temp: 98.1 °F (36.7 °C) (06/17/18 0730)  Pulse: 74 (06/17/18 0730)  Resp: 19 (06/17/18 0730)  BP: (!) 181/79 (06/17/18 0730)  SpO2: 100 % (06/17/18 0730) Vital Signs (24h Range):  Temp:  [98 °F (36.7 °C)-98.5 °F (36.9 °C)]  98.1 °F (36.7 °C)  Pulse:  [68-88] 74  Resp:  [18-20] 19  SpO2:  [95 %-100 %] 100 %  BP: (131-181)/(58-79) 181/79     Weight: 55.2 kg (121 lb 11.1 oz)  Body mass index is 22.99 kg/m².  No intake or output data in the 24 hours ending 06/17/18 0938   Physical Exam   Constitutional: She is oriented to person, place, and time. She is cooperative. She appears ill. No distress.   HENT:   Head: Normocephalic and atraumatic.   Eyes: Conjunctivae are normal.   PERRL   Neck: Neck supple. No JVD present.   Cardiovascular: Normal rate, regular rhythm and normal heart sounds.    Pulmonary/Chest: Effort normal and breath sounds normal.   Abdominal: Soft. Bowel sounds are normal. She exhibits no distension. There is no tenderness.   Musculoskeletal: Normal range of motion. She exhibits no edema.   Neurological: She is alert and oriented to person, place, and time.   Patient continue to display signs of mild baseline dementia.    Skin: Skin is warm and dry.   Psychiatric: She has a normal mood and affect. Her behavior is normal. Thought content normal. Cognition and memory are impaired.   Nursing note and vitals reviewed.      Significant Labs:   Blood Culture: No results for input(s): LABBLOO in the last 48 hours.  BMP:   Recent Labs  Lab 06/17/18  0610   *      K 3.9      CO2 28   BUN 37*   CREATININE 1.9*   CALCIUM 9.0   MG 2.2     CBC:   Recent Labs  Lab 06/16/18  0542 06/17/18  0610   WBC 5.65 7.06   HGB 8.2* 8.2*   HCT 25.7* 26.1*    221     CMP:   Recent Labs  Lab 06/16/18  0542 06/17/18  0610    138   K 3.6 3.9    102   CO2 28 28    116*   BUN 27* 37*   CREATININE 1.6* 1.9*   CALCIUM 8.9 9.0   PROT 6.3 6.4   ALBUMIN 3.1* 3.1*   BILITOT 0.8 0.8   ALKPHOS 59 58   AST 19 16   ALT <5* <5*   ANIONGAP 9 8   EGFRNONAA 29* 23*     All pertinent labs within the past 24 hours have been reviewed.    Significant Imaging:   Imaging Results          X-Ray Chest 1 View (Final result)  Result  time 06/14/18 11:31:54    Final result by ISIDORO Payne Sr., MD (06/14/18 11:31:54)                 Impression:      1. There has been interval opacification of the base of the left hemithorax.  This is characteristic of a small left pleural effusion.  2. There is no acute pulmonary process visualized.  3. There is a mild amount of dextroconvex scoliosis of the thoracic spine.  .      Electronically signed by: Jonathon Payne MD  Date:    06/14/2018  Time:    11:31             Narrative:    EXAMINATION:  XR CHEST 1 VIEW    CLINICAL HISTORY:  weakness;    COMPARISON:  12/06/2014    FINDINGS:  The size of the heart is prominent.  There is no acute pulmonary process visualized.  There are calcific densities projected over the right breast and right hemithorax.  There has been interval opacification of the base of the left hemithorax.  There is no pneumothorax.  The right costophrenic angle is sharp.  There is a mild amount of dextroconvex scoliosis of the thoracic spine.                               CT Head Without Contrast (Final result)  Result time 06/14/18 11:28:56    Final result by ISIDORO Payne Sr., MD (06/14/18 11:28:56)                 Impression:      1. There is no evidence of an acute ischemic event. There are moderate bilateral periventricular ischemic white matter changes.  2. There is no acute appearing intracranial hemorrhage.  All CT scans at this facility use dose modulation, iterative reconstruction, and/or weight base dosing when appropriate to reduce radiation dose when appropriate to reduce radiation dose to as low as reasonably achievable.      Electronically signed by: Jonathon Payne MD  Date:    06/14/2018  Time:    11:28             Narrative:    EXAMINATION:  CT HEAD WITHOUT CONTRAST    CLINICAL HISTORY:  WeaknessDizziness;    TECHNIQUE:  Standard brain CT protocol without IV contrast was performed.    COMPARISON:  06/14/2017    FINDINGS:  There is no evidence of an acute ischemic  event.  There are moderate bilateral periventricular ischemic white matter changes.  There is no acute appearing intracranial hemorrhage.  There is no calvarial fracture.  The visualized portion of the paranasal sinuses is clear.                              Assessment/Plan:      * Syncope    -Likely due to a vasovagal episode? Orthostatics   -+orthostatis   -Decreased Coreg.   -Monitor orthostatics qshift   -Improved   -Chilo Carotid US revealed 20-50% stenosis within the right internal carotid artery and 50-59% stenosis within the left internal carotid artery.              Encephalopathy    -Overall improved. However, patient likely has a baseline dementia.   -Work up for acute metabolic cause is negative.    -Outpatient Neurology follow up recommended.           DM II (diabetes mellitus, type II), controlled    -NISS.   -ADA diet.   -Hemoglobin A1C was 5.5 on 6/14/18.           Hypomagnesemia    Monitor and replace as needed.   Resolved         Hypokalemia    Monitor and replace as needed.   Resolved         At risk for falls    -Bed alarm.   -Fall precautions.   -PT and OT recommends skilled nursing             Hypertensive urgency    -Improved.   -Coreg dose decreased due to syncope.   -Continue Norvasc.   -Hydralazine as needed.           CKD (chronic kidney disease), stage IV    -Stable   -Baseline Creatinine 1.8  -Appropriately dose medications and monitor.  -Creatinine 1.9  -gentle IVFs              VTE Risk Mitigation         Ordered     heparin (porcine) injection 5,000 Units  Every 8 hours      06/14/18 1622     IP VTE HIGH RISK PATIENT  Once      06/14/18 1348              Violetta Barrios NP  Department of Hospital Medicine   [facility]

## 2018-06-17 NOTE — PT/OT/SLP PROGRESS
"Physical Therapy Treatment    Patient Name:  Brynn Augustine   MRN:  [id number]    Recommendations:     Discharge Recommendations:  nursing facility, skilled   Discharge Equipment Recommendations: none   Barriers to discharge: None    Assessment:     Brynn Augustine is a 87 y.o. female admitted with a medical diagnosis of Syncope.  She presents with the following impairments/functional limitations:  weakness, impaired endurance, impaired sensation, impaired self care skills, impaired functional mobilty, gait instability, impaired balance, visual deficits, impaired cognition, decreased safety awareness, decreased ROM .  Continues to require assistance for safe mobility . Good participation, demonstrated the ability to make functional gains.    Rehab Prognosis:  good; patient would benefit from acute skilled PT services to address these deficits and reach maximum level of function.      Recent Surgery: * No surgery found *      Plan:     During this hospitalization, patient to be seen  (5-7 x week as tolerated) to address the above listed problems via gait training, therapeutic activities, therapeutic exercises  · Plan of Care Expires:  06/22/18   Plan of Care Reviewed with: patient, son    Subjective     Communicated with [facility] and pt's nurse-Mouna prior to session.  Patient found sitting in bed, son in room upon PT entry to room, agreeable to treatment.      Chief Complaint: none  Patient comments/goals: "I like to exercise, I do it when I am watching TV"  Pain/Comfort:  · Pain Rating 1: 0/10  · Pain Rating Post-Intervention 2: 0/10    Patients cultural, spiritual, Mandaeism conflicts given the current situation:      Objective:     Patient found with: peripheral IV, telemetry     General Precautions: Standard, fall (decreased vision)   Orthopedic Precautions:N/A   Braces:       Functional Mobility:  · Bed Mobility:     · Rolling Left:  contact guard assistance  · Supine to Sit: contact guard assistance  · Transfers:   "   · Sit to Stand:  contact guard assistance with no AD  · Bed to Chair: minimum assistance with  rolling walker  using  Step Transfer  · Gait: 150 feet with a RW and MIN A. Reqires MIN A for safety due to decreased vision and cognition. No LOB with turning today. Inconsistant gait pattern.       AM-PAC 6 CLICK MOBILITY  Turning over in bed (including adjusting bedclothes, sheets and blankets)?: 4  Sitting down on and standing up from a chair with arms (e.g., wheelchair, bedside commode, etc.): 3  Moving from lying on back to sitting on the side of the bed?: 3  Moving to and from a bed to a chair (including a wheelchair)?: 3  Need to walk in hospital room?: 3  Climbing 3-5 steps with a railing?: 1  Total Score: 17       Therapeutic Activities and Exercises:   20 reps of active, seated LE therex: ankle pumps for circulation, knee extensions, hip flexion and hip ab/adduction.     Patient left up in chair with all lines intact, call button in reach, nurse and nurse tech notified and son present..    GOALS:    Physical Therapy Goals        Problem: Physical Therapy Goal    Goal Priority Disciplines Outcome Goal Variances Interventions   Physical Therapy Goal     PT/OT, PT      Description:  PT WILL BE SEEN FOR P.T. FOR A MIN OF 5 OUT OF 7 DAYS A WEEK  LT18  1. PT WILL COMPLETE BED MOBILITY MARICARMEN  2. PT WILL T/F TO CHAIR WITH RW AND CGA  3. PT WILL GT TRAIN X 150' WITH RW AND CGA  4. PT WILL COMPLETE B LE TE X 20 REPS                    Time Tracking:     PT Received On: 18  PT Start Time: 1000     PT Stop Time: 1030  PT Total Time (min): 30 min     Billable Minutes: Gait Training 15 and Therapeutic Exercise 15    Treatment Type: Treatment  PT/PTA: PTA     PTA Visit Number: 2     Lesley Barton, PTA  2018

## 2018-06-17 NOTE — ASSESSMENT & PLAN NOTE
-Stable   -Baseline Creatinine 1.8  -Appropriately dose medications and monitor.  -Creatinine 1.9  -gentle IVFs

## 2018-06-17 NOTE — PLAN OF CARE
Problem: Patient Care Overview  Goal: Interdisciplinary Rounds/Family Conf  Outcome: Ongoing (interventions implemented as appropriate)  Pt stable. Plan of care reviewed with patient. Patient verbalized understanding. Bed low, wheels locked, bed alarm on, call light within reach. Patient instructed to call for assistance. Will continue to monitor.     Turning Pt every 2 hours.     Value Min Max   Temp 97.4 °F (36.3 °C) 98.5 °F (36.9 °C)   Pulse 68 88   Resp 16 18   BP: Systolic 131 169   BP: Diastolic 58 77   MAP (mmHg) 83 106   SpO2 95 % 100 %

## 2018-06-17 NOTE — ASSESSMENT & PLAN NOTE
-Likely due to a vasovagal episode? Orthostatics   -+orthostatis   -Decreased Coreg.   -Monitor orthostatics qshift   -Improved   -Chilo Carotid US revealed 20-50% stenosis within the right internal carotid artery and 50-59% stenosis within the left internal carotid artery.

## 2018-06-17 NOTE — SUBJECTIVE & OBJECTIVE
Interval History: No acute events overnight. Awaiting placement.     Review of Systems   Constitutional: Positive for fatigue. Negative for appetite change, chills, diaphoresis and fever.   HENT: Negative for congestion, ear pain, mouth sores, sore throat and trouble swallowing.    Eyes: Negative for pain and visual disturbance.   Respiratory: Negative for cough, chest tightness and shortness of breath.    Cardiovascular: Negative for chest pain, palpitations and leg swelling.   Gastrointestinal: Negative for abdominal pain, constipation and nausea.   Endocrine: Negative for cold intolerance, heat intolerance, polydipsia and polyuria.   Genitourinary: Negative for dysuria, frequency and hematuria.   Musculoskeletal: Negative for arthralgias, back pain, myalgias and neck pain.   Skin: Negative for pallor, rash and wound.   Allergic/Immunologic: Negative for environmental allergies and immunocompromised state.   Neurological: Negative for dizziness, seizures, syncope, weakness, numbness and headaches.        Positive for a fall.    Hematological: Negative for adenopathy. Does not bruise/bleed easily.   Psychiatric/Behavioral: Negative for agitation, confusion and sleep disturbance.     Objective:     Vital Signs (Most Recent):  Temp: 98.1 °F (36.7 °C) (06/17/18 0730)  Pulse: 74 (06/17/18 0730)  Resp: 19 (06/17/18 0730)  BP: (!) 181/79 (06/17/18 0730)  SpO2: 100 % (06/17/18 0730) Vital Signs (24h Range):  Temp:  [98 °F (36.7 °C)-98.5 °F (36.9 °C)] 98.1 °F (36.7 °C)  Pulse:  [68-88] 74  Resp:  [18-20] 19  SpO2:  [95 %-100 %] 100 %  BP: (131-181)/(58-79) 181/79     Weight: 55.2 kg (121 lb 11.1 oz)  Body mass index is 22.99 kg/m².  No intake or output data in the 24 hours ending 06/17/18 0938   Physical Exam   Constitutional: She is oriented to person, place, and time. She is cooperative. She appears ill. No distress.   HENT:   Head: Normocephalic and atraumatic.   Eyes: Conjunctivae are normal.   PERRL   Neck: Neck  supple. No JVD present.   Cardiovascular: Normal rate, regular rhythm and normal heart sounds.    Pulmonary/Chest: Effort normal and breath sounds normal.   Abdominal: Soft. Bowel sounds are normal. She exhibits no distension. There is no tenderness.   Musculoskeletal: Normal range of motion. She exhibits no edema.   Neurological: She is alert and oriented to person, place, and time.   Patient continue to display signs of mild baseline dementia.    Skin: Skin is warm and dry.   Psychiatric: She has a normal mood and affect. Her behavior is normal. Thought content normal. Cognition and memory are impaired.   Nursing note and vitals reviewed.      Significant Labs:   Blood Culture: No results for input(s): LABBLOO in the last 48 hours.  BMP:   Recent Labs  Lab 06/17/18  0610   *      K 3.9      CO2 28   BUN 37*   CREATININE 1.9*   CALCIUM 9.0   MG 2.2     CBC:   Recent Labs  Lab 06/16/18  0542 06/17/18  0610   WBC 5.65 7.06   HGB 8.2* 8.2*   HCT 25.7* 26.1*    221     CMP:   Recent Labs  Lab 06/16/18  0542 06/17/18  0610    138   K 3.6 3.9    102   CO2 28 28    116*   BUN 27* 37*   CREATININE 1.6* 1.9*   CALCIUM 8.9 9.0   PROT 6.3 6.4   ALBUMIN 3.1* 3.1*   BILITOT 0.8 0.8   ALKPHOS 59 58   AST 19 16   ALT <5* <5*   ANIONGAP 9 8   EGFRNONAA 29* 23*     All pertinent labs within the past 24 hours have been reviewed.    Significant Imaging:   Imaging Results          X-Ray Chest 1 View (Final result)  Result time 06/14/18 11:31:54    Final result by ISIDORO Payne Sr., MD (06/14/18 11:31:54)                 Impression:      1. There has been interval opacification of the base of the left hemithorax.  This is characteristic of a small left pleural effusion.  2. There is no acute pulmonary process visualized.  3. There is a mild amount of dextroconvex scoliosis of the thoracic spine.  .      Electronically signed by: Jonathon Payne MD  Date:    06/14/2018  Time:    11:31              Narrative:    EXAMINATION:  XR CHEST 1 VIEW    CLINICAL HISTORY:  weakness;    COMPARISON:  12/06/2014    FINDINGS:  The size of the heart is prominent.  There is no acute pulmonary process visualized.  There are calcific densities projected over the right breast and right hemithorax.  There has been interval opacification of the base of the left hemithorax.  There is no pneumothorax.  The right costophrenic angle is sharp.  There is a mild amount of dextroconvex scoliosis of the thoracic spine.                               CT Head Without Contrast (Final result)  Result time 06/14/18 11:28:56    Final result by ISIDORO Payne Sr., MD (06/14/18 11:28:56)                 Impression:      1. There is no evidence of an acute ischemic event. There are moderate bilateral periventricular ischemic white matter changes.  2. There is no acute appearing intracranial hemorrhage.  All CT scans at this facility use dose modulation, iterative reconstruction, and/or weight base dosing when appropriate to reduce radiation dose when appropriate to reduce radiation dose to as low as reasonably achievable.      Electronically signed by: Jonathon Payne MD  Date:    06/14/2018  Time:    11:28             Narrative:    EXAMINATION:  CT HEAD WITHOUT CONTRAST    CLINICAL HISTORY:  WeaknessDizziness;    TECHNIQUE:  Standard brain CT protocol without IV contrast was performed.    COMPARISON:  06/14/2017    FINDINGS:  There is no evidence of an acute ischemic event.  There are moderate bilateral periventricular ischemic white matter changes.  There is no acute appearing intracranial hemorrhage.  There is no calvarial fracture.  The visualized portion of the paranasal sinuses is clear.

## 2018-06-18 ENCOUNTER — TELEPHONE (OUTPATIENT)
Dept: NEPHROLOGY | Facility: CLINIC | Age: 83
End: 2018-06-18

## 2018-06-18 PROBLEM — L30.8 DERMATITIS ASSOCIATED WITH MOISTURE: Status: ACTIVE | Noted: 2018-06-18

## 2018-06-18 LAB
ALBUMIN SERPL BCP-MCNC: 3.1 G/DL
ALP SERPL-CCNC: 60 U/L
ALT SERPL W/O P-5'-P-CCNC: <5 U/L
ANION GAP SERPL CALC-SCNC: 8 MMOL/L
AST SERPL-CCNC: 19 U/L
BASOPHILS # BLD AUTO: 0.02 K/UL
BASOPHILS NFR BLD: 0.3 %
BILIRUB SERPL-MCNC: 0.6 MG/DL
BUN SERPL-MCNC: 44 MG/DL
CALCIUM SERPL-MCNC: 9.1 MG/DL
CHLORIDE SERPL-SCNC: 103 MMOL/L
CO2 SERPL-SCNC: 28 MMOL/L
CREAT SERPL-MCNC: 2.1 MG/DL
DIFFERENTIAL METHOD: ABNORMAL
EOSINOPHIL # BLD AUTO: 0.4 K/UL
EOSINOPHIL NFR BLD: 5.9 %
ERYTHROCYTE [DISTWIDTH] IN BLOOD BY AUTOMATED COUNT: 18.3 %
EST. GFR  (AFRICAN AMERICAN): 24 ML/MIN/1.73 M^2
EST. GFR  (NON AFRICAN AMERICAN): 21 ML/MIN/1.73 M^2
GLUCOSE SERPL-MCNC: 109 MG/DL
HCT VFR BLD AUTO: 25.9 %
HGB BLD-MCNC: 8.3 G/DL
LYMPHOCYTES # BLD AUTO: 2.3 K/UL
LYMPHOCYTES NFR BLD: 37.1 %
MAGNESIUM SERPL-MCNC: 2.5 MG/DL
MCH RBC QN AUTO: 32.4 PG
MCHC RBC AUTO-ENTMCNC: 32 G/DL
MCV RBC AUTO: 101 FL
MONOCYTES # BLD AUTO: 0.5 K/UL
MONOCYTES NFR BLD: 8.7 %
NEUTROPHILS # BLD AUTO: 3 K/UL
NEUTROPHILS NFR BLD: 48 %
PHOSPHATE SERPL-MCNC: 4.1 MG/DL
PLATELET # BLD AUTO: 210 K/UL
PMV BLD AUTO: 11.1 FL
POCT GLUCOSE: 103 MG/DL (ref 70–110)
POCT GLUCOSE: 105 MG/DL (ref 70–110)
POCT GLUCOSE: 126 MG/DL (ref 70–110)
POCT GLUCOSE: 212 MG/DL (ref 70–110)
POTASSIUM SERPL-SCNC: 4.4 MMOL/L
PROT SERPL-MCNC: 6.3 G/DL
RBC # BLD AUTO: 2.56 M/UL
SODIUM SERPL-SCNC: 139 MMOL/L
WBC # BLD AUTO: 6.22 K/UL

## 2018-06-18 PROCEDURE — 96372 THER/PROPH/DIAG INJ SC/IM: CPT

## 2018-06-18 PROCEDURE — 97116 GAIT TRAINING THERAPY: CPT

## 2018-06-18 PROCEDURE — 63600175 PHARM REV CODE 636 W HCPCS: Performed by: PHYSICIAN ASSISTANT

## 2018-06-18 PROCEDURE — 97110 THERAPEUTIC EXERCISES: CPT

## 2018-06-18 PROCEDURE — 83735 ASSAY OF MAGNESIUM: CPT

## 2018-06-18 PROCEDURE — 84100 ASSAY OF PHOSPHORUS: CPT

## 2018-06-18 PROCEDURE — 36415 COLL VENOUS BLD VENIPUNCTURE: CPT

## 2018-06-18 PROCEDURE — 85025 COMPLETE CBC W/AUTO DIFF WBC: CPT

## 2018-06-18 PROCEDURE — 25000003 PHARM REV CODE 250: Performed by: PHYSICIAN ASSISTANT

## 2018-06-18 PROCEDURE — 25000003 PHARM REV CODE 250: Performed by: FAMILY MEDICINE

## 2018-06-18 PROCEDURE — 80053 COMPREHEN METABOLIC PANEL: CPT

## 2018-06-18 PROCEDURE — 21400001 HC TELEMETRY ROOM

## 2018-06-18 RX ORDER — AMLODIPINE BESYLATE 10 MG/1
10 TABLET ORAL DAILY
Status: DISCONTINUED | OUTPATIENT
Start: 2018-06-19 | End: 2018-06-20 | Stop reason: HOSPADM

## 2018-06-18 RX ORDER — REGADENOSON 0.08 MG/ML
0.4 INJECTION, SOLUTION INTRAVENOUS ONCE
Status: DISCONTINUED | OUTPATIENT
Start: 2018-06-19 | End: 2018-06-19

## 2018-06-18 RX ADMIN — HEPARIN SODIUM 5000 UNITS: 5000 INJECTION, SOLUTION INTRAVENOUS; SUBCUTANEOUS at 05:06

## 2018-06-18 RX ADMIN — AMLODIPINE BESYLATE 5 MG: 5 TABLET ORAL at 08:06

## 2018-06-18 RX ADMIN — MAGNESIUM OXIDE TAB 400 MG (241.3 MG ELEMENTAL MG) 400 MG: 400 (241.3 MG) TAB at 08:06

## 2018-06-18 RX ADMIN — MAGNESIUM OXIDE TAB 400 MG (241.3 MG ELEMENTAL MG) 400 MG: 400 (241.3 MG) TAB at 10:06

## 2018-06-18 RX ADMIN — HEPARIN SODIUM 5000 UNITS: 5000 INJECTION, SOLUTION INTRAVENOUS; SUBCUTANEOUS at 10:06

## 2018-06-18 RX ADMIN — PANTOPRAZOLE SODIUM 40 MG: 40 TABLET, DELAYED RELEASE ORAL at 08:06

## 2018-06-18 RX ADMIN — INSULIN ASPART 2 UNITS: 100 INJECTION, SOLUTION INTRAVENOUS; SUBCUTANEOUS at 11:06

## 2018-06-18 RX ADMIN — ALLOPURINOL 200 MG: 100 TABLET ORAL at 08:06

## 2018-06-18 RX ADMIN — CARVEDILOL 6.25 MG: 6.25 TABLET, FILM COATED ORAL at 08:06

## 2018-06-18 RX ADMIN — HEPARIN SODIUM 5000 UNITS: 5000 INJECTION, SOLUTION INTRAVENOUS; SUBCUTANEOUS at 02:06

## 2018-06-18 NOTE — TELEPHONE ENCOUNTER
Returned call to patient's daughter Tiffaine who is requesting that you come visit patient at [facility] when you get a chance. Informed her that you were in clinic and not on call. She expressed understanding. Patient is in Rm 206 if go visit.

## 2018-06-18 NOTE — HPI
Brynn Augustine is an 87 year old female with diabetes mellitus type II, hypertension and stage IV chronic kidney disease who presents to the ED with reports of a fall at home. The patient's son reports that he found her laying in her bath tub for approximately 6 hours. He suspects she fell into the tub while attempting to use the restroom during the night. The patient reports no complaints other than feeling fatigued. She denies pain. She is at her normal baseline health and does a history of chronic vision loss. She is oriented to person and place but does not time. She lives alone, but he checks on her daily. In the ED, the patient had an increased CPK of 419, CPK MB of 22.0, and BNP of 1,313.   Creatine up to 2.1 from 1.4 on admit. H/H down to 8.3/25.9. BNP 1000. Patient denies any chest pain or SOB. Did have dizziness this morning and has occasional dizziness with position change. EKG shows nonspecific ST changes.  20-50 on right and 50-59% stenosis. Head CT negative. +Orthostatic vitals. Follows with Nephrology as an OP.

## 2018-06-18 NOTE — PROGRESS NOTES
[facility] Hospital Medicine  Progress Note    Patient Name: Brynn Augustine  MRN: [id number]  Patient Class: IP- Inpatient   Admission Date: 6/14/2018  Length of Stay: 1 days  Attending Physician: Adonis Meade MD  Primary Care Provider: Gabby Carrera MD        Subjective:     Principal Problem:Syncope    HPI:  Brynn Augustine is an 87 year old female with diabetes mellitus type II, hypertension and stage IV chronic kidney disease who presents to the ED with reports of a fall at home. The patient's son reports that he found her laying in her bath tub this morning at 7 AM. He suspects she fell into the tub while attempting to use the restroom during the night. The patient reports no complaints other than feeling fatigued. She denies pain. She is at her normal baseline health and does a history of chronic vision loss. She is oriented to person and place but does not time. At the time of exam, she is unable to recall common details such as her birthday and who is president. However, her son reports that she was able to recall these details earlier in the day. He also reports that she does not have a history of memory issues or a diagnosis of dementia. She lives alone, but he checks on her daily. In the ED, the patient had an increased CPK of 419, CPK MB of 22.0, and BNP of 1,313. Code status was discussed. She is a DNR. Her three children are her surrogate medical decision makers.       Hospital Course:  Ms Augustine is a 87 year female with PMHx of HTN, CKD and DM. She was admitted to [facility] after reports of being found in her bathtub after a fall. Upon arrival, she was found to have a hypertensive urgency, mild encephalopathy, elevated CPK, hypokalemia and hypomagnesemia. The patient was observed overnight without incident. However, this morning, while on the toilet having a bowel movement, she had a syncopal episode. The patient was further evaluated with orthostatic vital signs, 2D echo and carotid  US. There were no significant findings. Her Coreg dose was decreased and her Lasix was held. Physical therapy evaluated the patient and recommended skilled nursing care. This was discussed with the patient and family. Patient and family was provided SNF listing for in [facility]. 2D echo revealed Normal EF 60-65%, DD, and pulmonary HTN. Bilateral carotid US: 20-50% stenosis within the right internal carotid artery and 50-59% stenosis within the left internal carotid artery. Awaiting on placement.   6/18/2018-While sitting up in chair today, nurse found patient difficult to aroused, eyes open, awaken to painful stimuli SBP in 80's. Patient assisted back to bed, lethargic, however back to baseline.     Interval History: Patient seen and examined. She experienced AMS, awake to painful stimuli.  SBP in 80's while sitting up in chair.   Assisted back to bed, mental status return to baseline.       Review of Systems   Constitutional: Positive for fatigue. Negative for chills and fever.   HENT: Negative for congestion, rhinorrhea and sinus pressure.    Respiratory: Negative for apnea, cough, choking, chest tightness, shortness of breath, wheezing and stridor.    Cardiovascular: Negative for chest pain, palpitations and leg swelling.   Gastrointestinal: Negative for abdominal distention, abdominal pain, diarrhea, nausea and vomiting.   Endocrine: Negative for cold intolerance and heat intolerance.   Genitourinary: Negative for difficulty urinating and hematuria.   Musculoskeletal: Negative for arthralgias and joint swelling.   Skin: Negative for color change, pallor and rash.   Neurological: Positive for dizziness and weakness. Negative for seizures, numbness and headaches.   Psychiatric/Behavioral: Negative for agitation. The patient is not nervous/anxious.      Objective:     Vital Signs (Most Recent):  Temp: 98.6 °F (37 °C) (06/18/18 1150)  Pulse: 70 (06/18/18 1400)  Resp: 18 (06/18/18 1150)  BP: 135/60  (06/18/18 1400)  SpO2: 99 % (06/18/18 1150) Vital Signs (24h Range):  Temp:  [97.5 °F (36.4 °C)-98.6 °F (37 °C)] 98.6 °F (37 °C)  Pulse:  [69-82] 70  Resp:  [18-20] 18  SpO2:  [95 %-99 %] 99 %  BP: ()/(39-78) 135/60     Weight: 55.1 kg (121 lb 7.6 oz)  Body mass index is 22.95 kg/m².    Intake/Output Summary (Last 24 hours) at 06/18/18 1423  Last data filed at 06/17/18 1442   Gross per 24 hour   Intake           523.75 ml   Output                0 ml   Net           523.75 ml      Physical Exam   Constitutional: She appears lethargic. She has a sickly appearance. No distress.   HENT:   Head: Normocephalic and atraumatic.   Eyes: Right eye exhibits no discharge. Left eye exhibits no discharge.   Neck: No JVD present.   Cardiovascular: Exam reveals no gallop and no friction rub.    No murmur heard.  Pulmonary/Chest: She has no wheezes. She has no rales. She exhibits no tenderness.   Abdominal: She exhibits no distension and no mass. There is no tenderness. There is no rebound and no guarding. No hernia.   Musculoskeletal: She exhibits no edema or deformity.   Neurological: She appears lethargic.   Skin: Skin is warm. Capillary refill takes 2 to 3 seconds. She is not diaphoretic.       Significant Labs:   CBC:   Recent Labs  Lab 06/17/18  0610 06/18/18  0506   WBC 7.06 6.22   HGB 8.2* 8.3*   HCT 26.1* 25.9*    210     CMP:   Recent Labs  Lab 06/17/18  0610 06/18/18  0506    139   K 3.9 4.4    103   CO2 28 28   * 109   BUN 37* 44*   CREATININE 1.9* 2.1*   CALCIUM 9.0 9.1   PROT 6.4 6.3   ALBUMIN 3.1* 3.1*   BILITOT 0.8 0.6   ALKPHOS 58 60   AST 16 19   ALT <5* <5*   ANIONGAP 8 8   EGFRNONAA 23* 21*       Significant Imaging:     Imaging Results          X-Ray Chest 1 View (Final result)  Result time 06/14/18 11:31:54    Final result by ISIDORO Payne Sr., MD (06/14/18 11:31:54)                 Impression:      1. There has been interval opacification of the base of the left hemithorax.   This is characteristic of a small left pleural effusion.  2. There is no acute pulmonary process visualized.  3. There is a mild amount of dextroconvex scoliosis of the thoracic spine.  .      Electronically signed by: Jonathon Payne MD  Date:    06/14/2018  Time:    11:31             Narrative:    EXAMINATION:  XR CHEST 1 VIEW    CLINICAL HISTORY:  weakness;    COMPARISON:  12/06/2014    FINDINGS:  The size of the heart is prominent.  There is no acute pulmonary process visualized.  There are calcific densities projected over the right breast and right hemithorax.  There has been interval opacification of the base of the left hemithorax.  There is no pneumothorax.  The right costophrenic angle is sharp.  There is a mild amount of dextroconvex scoliosis of the thoracic spine.                               CT Head Without Contrast (Final result)  Result time 06/14/18 11:28:56    Final result by ISIDORO Payne Sr., MD (06/14/18 11:28:56)                 Impression:      1. There is no evidence of an acute ischemic event. There are moderate bilateral periventricular ischemic white matter changes.  2. There is no acute appearing intracranial hemorrhage.  All CT scans at this facility use dose modulation, iterative reconstruction, and/or weight base dosing when appropriate to reduce radiation dose when appropriate to reduce radiation dose to as low as reasonably achievable.      Electronically signed by: Jonathon Payne MD  Date:    06/14/2018  Time:    11:28             Narrative:    EXAMINATION:  CT HEAD WITHOUT CONTRAST    CLINICAL HISTORY:  WeaknessDizziness;    TECHNIQUE:  Standard brain CT protocol without IV contrast was performed.    COMPARISON:  06/14/2017    FINDINGS:  There is no evidence of an acute ischemic event.  There are moderate bilateral periventricular ischemic white matter changes.  There is no acute appearing intracranial hemorrhage.  There is no calvarial fracture.  The visualized portion of the  paranasal sinuses is clear.                              Assessment/Plan:      * Syncope    -Likely due to a vasovagal episode? Orthostatics   -+orthostatis   -Monitor orthostatics qshift   -Continue IVF  -[place]ilo Carotid US revealed 20-50% stenosis within the right internal carotid artery and 50-59% stenosis within the left internal carotid artery.    Consult cardiology           Encephalopathy    -Overall improved. However, patient likely has a baseline dementia.   -Work up for acute metabolic cause is negative.    -Outpatient Neurology follow up recommended.           DM II (diabetes mellitus, type II), controlled    -NISS.   -ADA diet.   -Hemoglobin A1C was 5.5 on 6/14/18.           Hypomagnesemia    Monitor and replace as needed.   Resolved         Hypokalemia    Monitor and replace as needed.   Resolved         At risk for falls    -Bed alarm.   -Fall precautions.   -PT and OT recommends skilled nursing             Hypertensive urgency    -Improved.   -Coreg dose decreased due to syncope.   -Continue Norvasc.   -Hydralazine as needed.           CKD (chronic kidney disease), stage IV    -Stable   -Baseline Creatinine 1.8  -Appropriately dose medications and monitor.  -Creatinine 2.1 today  -Continue IVF   Monitor             VTE Risk Mitigation         Ordered     heparin (porcine) injection 5,000 Units  Every 8 hours      06/14/18 1622     IP VTE HIGH RISK PATIENT  Once      06/14/18 1348              Akhil Noble NP  Department of Hospital Medicine   [facility]

## 2018-06-18 NOTE — PT/OT/SLP PROGRESS
Physical Therapy  Treatment    Brynn Augustine   MRN: [id number]   Admitting Diagnosis: Syncope    PT Received On: 06/18/18  PT Start Time: 1255     PT Stop Time: 1325    PT Total Time (min): 30 min       Billable Minutes:  Gait Training 15 and Therapeutic Exercise 10    Treatment Type: Treatment  PT/PTA: PTA     PTA Visit Number: 3       General Precautions: Standard, fall  Orthopedic Precautions: N/A   Braces: N/A         Subjective:  Communicated with EPIC AND NURSECARMEN  prior to session.  PATIENT AGREE TO TX NOW.    Pain/Comfort  Pain Rating 1: 0/10    Objective:   Patient found with: peripheral IV, telemetry. PATIENT SUPINE UPON PTA ENTRY TO ROOM FOR TX .    Functional Mobility:  Bed Mobility:    SUPINE TO SIT AT CGA X1    Transfers:   SIT TO STAND , STAND TO SIT AT CGA X1.    Gait:    AMBULATE INTO HALLWAY AT [facility] TO MIN ASSIST X1 WITH RW , PATIENT STEADY ON HER FEET , GOOD PACE.    Stairs:  N/A    Balance:   Static Sit: GOOD-: Takes MODERATE challenges from all directions but inconsistently  Dynamic Sit: GOOD: Maintains balance through MODERATE excursions of active trunk movement  Static Stand: FAIR+: Takes MINIMAL challenges from all directions  Dynamic stand: POOR+: Needs MIN (minimal ) assist during gait     Therapeutic Activities and Exercises:  JOVON LE TE ALL AVAIALBLE PLANES, GT , T/FS OOB TO B/S CHAIR.    AM-PAC 6 CLICK MOBILITY  How much help from another person does this patient currently need?   1 = Unable, Total/Dependent Assistance  2 = A lot, Maximum/Moderate Assistance  3 = A little, Minimum/Contact Guard/Supervision  4 = None, Modified [place]/Independent    Turning over in bed (including adjusting bedclothes, sheets and blankets)?: 4  Sitting down on and standing up from a chair with arms (e.g., wheelchair, bedside commode, etc.): 3  Moving from lying on back to sitting on the side of the bed?: 3  Moving to and from a bed to a chair (including a wheelchair)?: 3  Need to walk in hospital  room?: 3  Climbing 3-5 steps with a railing?: 1  Total Score: 17    AM-PAC Raw Score CMS G-Code Modifier Level of Impairment Assistance   6 % Total / Unable   7 - 9 CM 80 - 100% Maximal Assist   10 - 14 CL 60 - 80% Moderate Assist   15 - 19 CK 40 - 60% Moderate Assist   20 - 22 CJ 20 - 40% Minimal Assist   23 CI 1-20% SBA / CGA   24 CH 0% Independent/ Mod I     Patient left up in chair with all lines intact and call button in reach, NURSE NOTIFIED.    Assessment:  Brynn Augustine is a 87 y.o. female with a medical diagnosis of Syncope .    Rehab identified problem list/impairments: Rehab identified problem list/impairments: weakness, gait instability, impaired endurance, impaired balance, impaired self care skills, impaired functional mobilty    Rehab potential is good.    Activity tolerance: Good    Discharge recommendations: Discharge Facility/Level Of Care Needs: nursing facility, skilled     Barriers to discharge:      Equipment recommendations: Equipment Needed After Discharge: none     GOALS:    Physical Therapy Goals        Problem: Physical Therapy Goal    Goal Priority Disciplines Outcome Goal Variances Interventions   Physical Therapy Goal     PT/OT, PT Ongoing (interventions implemented as appropriate)     Description:  PT WILL BE SEEN FOR P.T. FOR A MIN OF 5 OUT OF 7 DAYS A WEEK  LT18  1. PT WILL COMPLETE BED MOBILITY MARICARMEN  2. PT WILL T/F TO CHAIR WITH RW AND CGA  3. PT WILL GT TRAIN X 150' WITH RW AND CGA  4. PT WILL COMPLETE B LE TE X 20 REPS                    PLAN:    Patient to be seen 5 x/week  to address the above listed problems via gait training, therapeutic activities, therapeutic exercises  Plan of Care expires: 18  Plan of Care reviewed with: patient         Sarah Ramirez, PTA  2018

## 2018-06-18 NOTE — PLAN OF CARE
CM spoke to patient's son (Tushar Landis) regarding SNF preferences.  1. [facility]  Choice form completed and witnessed.  Choice form placed in patient blue folder.  Referral made in right care.

## 2018-06-18 NOTE — SUBJECTIVE & OBJECTIVE
Interval History: Patient seen and examined. She experienced AMS, awake to painful stimuli.  SBP in 80's while sitting up in chair.   Assisted back to bed, mental status return to baseline.       Review of Systems   Constitutional: Positive for fatigue. Negative for chills and fever.   HENT: Negative for congestion, rhinorrhea and sinus pressure.    Respiratory: Negative for apnea, cough, choking, chest tightness, shortness of breath, wheezing and stridor.    Cardiovascular: Negative for chest pain, palpitations and leg swelling.   Gastrointestinal: Negative for abdominal distention, abdominal pain, diarrhea, nausea and vomiting.   Endocrine: Negative for cold intolerance and heat intolerance.   Genitourinary: Negative for difficulty urinating and hematuria.   Musculoskeletal: Negative for arthralgias and joint swelling.   Skin: Negative for color change, pallor and rash.   Neurological: Positive for dizziness and weakness. Negative for seizures, numbness and headaches.   Psychiatric/Behavioral: Negative for agitation. The patient is not nervous/anxious.      Objective:     Vital Signs (Most Recent):  Temp: 98.6 °F (37 °C) (06/18/18 1150)  Pulse: 70 (06/18/18 1400)  Resp: 18 (06/18/18 1150)  BP: 135/60 (06/18/18 1400)  SpO2: 99 % (06/18/18 1150) Vital Signs (24h Range):  Temp:  [97.5 °F (36.4 °C)-98.6 °F (37 °C)] 98.6 °F (37 °C)  Pulse:  [69-82] 70  Resp:  [18-20] 18  SpO2:  [95 %-99 %] 99 %  BP: ()/(39-78) 135/60     Weight: 55.1 kg (121 lb 7.6 oz)  Body mass index is 22.95 kg/m².    Intake/Output Summary (Last 24 hours) at 06/18/18 1423  Last data filed at 06/17/18 1442   Gross per 24 hour   Intake           523.75 ml   Output                0 ml   Net           523.75 ml      Physical Exam   Constitutional: She appears lethargic. She has a sickly appearance. No distress.   HENT:   Head: Normocephalic and atraumatic.   Eyes: Right eye exhibits no discharge. Left eye exhibits no discharge.   Neck: No JVD  present.   Cardiovascular: Exam reveals no gallop and no friction rub.    No murmur heard.  Pulmonary/Chest: She has no wheezes. She has no rales. She exhibits no tenderness.   Abdominal: She exhibits no distension and no mass. There is no tenderness. There is no rebound and no guarding. No hernia.   Musculoskeletal: She exhibits no edema or deformity.   Neurological: She appears lethargic.   Skin: Skin is warm. Capillary refill takes 2 to 3 seconds. She is not diaphoretic.       Significant Labs:   CBC:   Recent Labs  Lab 06/17/18  0610 06/18/18  0506   WBC 7.06 6.22   HGB 8.2* 8.3*   HCT 26.1* 25.9*    210     CMP:   Recent Labs  Lab 06/17/18  0610 06/18/18  0506    139   K 3.9 4.4    103   CO2 28 28   * 109   BUN 37* 44*   CREATININE 1.9* 2.1*   CALCIUM 9.0 9.1   PROT 6.4 6.3   ALBUMIN 3.1* 3.1*   BILITOT 0.8 0.6   ALKPHOS 58 60   AST 16 19   ALT <5* <5*   ANIONGAP 8 8   EGFRNONAA 23* 21*       Significant Imaging:     Imaging Results          X-Ray Chest 1 View (Final result)  Result time 06/14/18 11:31:54    Final result by ISIDORO Payne Sr., MD (06/14/18 11:31:54)                 Impression:      1. There has been interval opacification of the base of the left hemithorax.  This is characteristic of a small left pleural effusion.  2. There is no acute pulmonary process visualized.  3. There is a mild amount of dextroconvex scoliosis of the thoracic spine.  .      Electronically signed by: Jonathon Payne MD  Date:    06/14/2018  Time:    11:31             Narrative:    EXAMINATION:  XR CHEST 1 VIEW    CLINICAL HISTORY:  weakness;    COMPARISON:  12/06/2014    FINDINGS:  The size of the heart is prominent.  There is no acute pulmonary process visualized.  There are calcific densities projected over the right breast and right hemithorax.  There has been interval opacification of the base of the left hemithorax.  There is no pneumothorax.  The right costophrenic angle is sharp.  There  is a mild amount of dextroconvex scoliosis of the thoracic spine.                               CT Head Without Contrast (Final result)  Result time 06/14/18 11:28:56    Final result by ISIDORO Payne Sr., MD (06/14/18 11:28:56)                 Impression:      1. There is no evidence of an acute ischemic event. There are moderate bilateral periventricular ischemic white matter changes.  2. There is no acute appearing intracranial hemorrhage.  All CT scans at this facility use dose modulation, iterative reconstruction, and/or weight base dosing when appropriate to reduce radiation dose when appropriate to reduce radiation dose to as low as reasonably achievable.      Electronically signed by: Jonathon Payne MD  Date:    06/14/2018  Time:    11:28             Narrative:    EXAMINATION:  CT HEAD WITHOUT CONTRAST    CLINICAL HISTORY:  WeaknessDizziness;    TECHNIQUE:  Standard brain CT protocol without IV contrast was performed.    COMPARISON:  06/14/2017    FINDINGS:  There is no evidence of an acute ischemic event.  There are moderate bilateral periventricular ischemic white matter changes.  There is no acute appearing intracranial hemorrhage.  There is no calvarial fracture.  The visualized portion of the paranasal sinuses is clear.

## 2018-06-18 NOTE — CONSULTS
"   06/18/18 1130   Handoff Report   Given To JUANPABLO Moore   Pain/Comfort Assessments   Pain Assessment Performed Yes       Number Scale   Presence of Pain denies   Skin   Skin WDL ex   Skin Temperature warm   Skin Moisture Moist   Skin Elasticity Sluggish   Ace Risk Assessment   Sensory Perception 3-->slightly limited   Moisture 3-->occasionally moist   Activity 2-->chairfast   Mobility 3-->slightly limited   Nutrition 2-->probably inadequate   Friction and Shear 2-->potential problem   Ace Score 15       Wound 06/18/18 1130 Incontinence associated dermatitis midline Gluteal cleft   Date First Assessed/Time First Assessed: 06/18/18 1130   Primary Wound Type: Incontinence associated dermatitis  Orientation: midline  Location: Gluteal cleft   Wound WDL ex   Dressing Appearance Moist drainage   Drainage Amount Scant   Drainage Characteristics/Odor Serous   Appearance Red;Moist   Tissue loss description Partial thickness   Periwound Area Dry;Intact   Wound Edges Open   Wound Length (cm) 3 cm   Wound Width (cm) 0.5 cm   Wound Depth (cm) 0.1 cm   Wound Volume (cm^3) 0.15 cm^3   Care Cleansed with:;Sterile normal saline;Applied:;Skin Barrier   Dressing Hydrocolloid   Dressing Change Due 06/23/18   Skin Interventions   Pressure Reduction Devices Pressure-redistributing mattress utilized;Foam padding utilized   Pressure Reduction Techniques frequent weight shift encouraged;heels elevated off bed;positioned off wounds;pressure points protected   Skin Protection Adhesive use limited;Incontinence pads;Skin sealant/moisture barrier;Hydrocolloids     Consulted on this 86 y/o F patient for skin breakdown to gluteal cleft. Patient admitted 6/14 with hypertensive urgency and fatigue. She has PMH significant for DM, HTN, CKD. SKin assessed. Bilateral heels intact with blanchable redness.  Patient turned to left side with assistance.  IAD noted to gluteal cleft with partial thickness tissue loss "split" measuring 3x0.5cm with " "moist red wound bed and surrounding blanchable redness.  CLeansed with saline and patted dry. Intact moses wound skin painted with cavilon.  Duoderm applied to cover open wound.  Patient then positioned with foam wedge and pillows for pressure offloading of sacrum and heels. Patient is high risk for pressure injury and will have PIP started.  Please see below:    MASD gluteal cleft:  1. Cleanse with saline  2. Pat dry  3. Paint with cavilon  4. Apply duoderm  5. Change DuoDERM (hydrocolloid) dressing every 5 days (PRN if edges roll)  6. Strict pressure offloading with wedge.     Skin Care Precautions / Pressure Injury Prevention:  1. Follow "Guidelines for Prevention of Pressure Ulcers in At Risk Patients"  These guidelines can be found on the [facility] Intr[employer]et by searching "Wound Care / Ostomy Resources"  2. Document wound assessment in [facility] using guidelines in Inez's "Assessment : Wound" procedure  3. Limit the amount of linen/underpad between patient and mattress surface to ONE fitted sheet and ONE covidien underpad - NO draw sheet/briefs.  4. Obtain Easi Cleans Foam Wipes for providing moses care - avoid the use of wash cloths to areas affected by IAD.  5. Apply Clear Barrier Ointment to perineal / perirectal areas in a thin even layer to clean dry skin BID and after each episode of pericare  6. Apply sween 24 moisturizer cream to all dry skin after daily bath and prn  7. Obtain foam wedge from materials management to assist with maintaining proper position changes at least q 2hours and document actual position in EPIC q 2hours  8. Elevate heels off mattress on 2 separate pillows placed lengthwise under each leg supporting the leg from knee to ankle.  Document in EPIC flow sheet every 2 hours.  9. .Do NOT elevate HOB greater than 30 degrees unless contraindicated.  10. Remove SCD/Plexi Pulses/MARIA T's every 12 hours for 30 minutes and assess skin underneath these devices for breakdown          "

## 2018-06-18 NOTE — PLAN OF CARE
Problem: Pressure Ulcer Risk (Ace Scale) (Adult,Obstetrics,Pediatric)  Intervention: Turn/Reposition Often  Patient awake and alert free from falls and injury son at bedside, normal sinus rhythm on monitor, denies pain at this time, frequent weight shift assistance provided, POC reviewed with patient and son,  bed low locked, call light within reach, will continue to monitor

## 2018-06-18 NOTE — PLAN OF CARE
Problem: Patient Care Overview  Goal: Plan of Care Review  Outcome: Ongoing (interventions implemented as appropriate)  Pt free of falls during shift. VSS. PIV intact and infusing fluids per orders. SR on tele monitor. Pt had orthostatic hypotensive event while sitting in chair after therapy, all symptoms resolved after laying pt in bed, NP Chacorta aware. Pt on room air, no SOB noted. Call light in reach, hourly rounding made, daughter in law at bedside, will continue to monitor.

## 2018-06-18 NOTE — PLAN OF CARE
Problem: Physical Therapy Goal  Goal: Physical Therapy Goal  PT WILL BE SEEN FOR P.T. FOR A MIN OF 5 OUT OF 7 DAYS A WEEK  LT18  1. PT WILL COMPLETE BED MOBILITY MARICARMEN  2. PT WILL T/F TO CHAIR WITH RW AND CGA  3. PT WILL GT TRAIN X 150' WITH RW AND CGA  4. PT WILL COMPLETE B LE TE X 20 REPS   Outcome: Ongoing (interventions implemented as appropriate)  PATIENT WITH GREAT EFFORTS FOR OOB ACTIVITY TO B/S CHAIR, GT INTO HALLWAY AT 75'X2 WITH RW AT CGAX1 , [facility].

## 2018-06-18 NOTE — ASSESSMENT & PLAN NOTE
-Syncopal episode likely vasovagal in nature. Was followed by attempting to have a BM  -Also noted to have orthostatic vitals  -Needs to hydrate  -Recommend stopping Coreg to avoid orthostasis.   -Increase Amlodipine to 10mg daily   -Recommend pharm stress test tomorrow to rule out ischemia'  -Carotid US shows no significant stenosis  -head CT negative

## 2018-06-18 NOTE — SUBJECTIVE & OBJECTIVE
Past Medical History:   Diagnosis Date    Anemia     Arthritis     Back pain     Breast cancer 2008    right    Diabetes with neurologic complications     Glaucoma     Gout     Hyperlipidemia     Hypertension     Hypoglycemia 12/6/2014    Kidney disease, chronic, stage IV (GFR 15-29 ml/min)     Renal manifestation of secondary diabetes mellitus     Retinitis pigmentosa 05/04/2016    outside eye exam (No BDR)    Skin cancer     several       Past Surgical History:   Procedure Laterality Date    APPENDECTOMY  1964    incidental with choly    BREAST SURGERY Right 08/13/2008    excisional Bx =infiltrating ductal ca, low grade ;calcifications identified within benign and malignant ducts.    BREAST SURGERY Right 09/09/2008    sentinal node and lymoh node dissection    CHOLECYSTECTOMY  1964    EYE SURGERY Bilateral 2006    IOL ou ( 2006) and YAGOD    HYSTERECTOMY  1971    Moh's  procedure Left 04/25/2011    BCC, left chin; BSC on Lt upper lip, BCC lt side of nose (dR FELIZ)    SPINE SURGERY  1975    Lumbosacral disc       Review of patient's allergies indicates:   Allergen Reactions    Ace inhibitors      Caused hyperkalemia    Arb-angiotensin receptor antagonist Other (See Comments)     hyperkalemia    Codeine Nausea Only    Morphine Nausea Only       No current facility-administered medications on file prior to encounter.      Current Outpatient Prescriptions on File Prior to Encounter   Medication Sig    allopurinol (ZYLOPRIM) 100 MG tablet TAKE 2 TABLETS BY MOUTH ONCE DAILY    amLODIPine (NORVASC) 5 MG tablet Take 1 tablet (5 mg total) by mouth once daily.    blood sugar diagnostic (ONETOUCH ULTRA TEST) Strp TEST BLOOD SUGAR EVERY DAY AS DIRECTED    blood sugar diagnostic Strp 1 strip by Misc.(Non-Drug; Combo Route) route 3 (three) times daily.    blood-glucose meter kit Use as instructed    carvedilol (COREG) 12.5 MG tablet TAKE 1 TABLET BY MOUTH TWICE DAILY    ergocalciferol  (ERGOCALCIFEROL) 50,000 unit Cap Take 1 capsule by mouth every 7 days.    ergocalciferol (ERGOCALCIFEROL) 50,000 unit Cap TAKE 1 CAPSULE BY MOUTH EVERY 7 DAYS    furosemide (LASIX) 40 MG tablet Take 40 mg by mouth as needed.    lancets Misc 1 Stick by Misc.(Non-Drug; Combo Route) route 3 (three) times daily.    lancing device with lancets Kit 1 each by Misc.(Non-Drug; Combo Route) route 3 (three) times daily.     Family History     Problem Relation (Age of Onset)    Cancer Mother, Father    Diabetes Sister    Heart disease Brother, Brother    Stroke Brother        Social History Main Topics    Smoking status: Never Smoker    Smokeless tobacco: Never Used    Alcohol use No    Drug use: No    Sexual activity: No     Review of Systems   Constitution: Positive for weakness. Negative for diaphoresis, malaise/fatigue, weight gain and weight loss.   HENT: Negative for congestion and nosebleeds.    Cardiovascular: Positive for syncope. Negative for chest pain, claudication, cyanosis, dyspnea on exertion, irregular heartbeat, leg swelling, near-syncope, orthopnea, palpitations and paroxysmal nocturnal dyspnea.   Respiratory: Negative for cough, hemoptysis, shortness of breath, sleep disturbances due to breathing, snoring, sputum production and wheezing.    Hematologic/Lymphatic: Negative for bleeding problem. Does not bruise/bleed easily.   Skin: Negative for rash.   Musculoskeletal: Positive for falls. Negative for arthritis, back pain, joint pain, muscle cramps and muscle weakness.   Gastrointestinal: Negative for abdominal pain, constipation, diarrhea, heartburn, hematemesis, hematochezia, melena and nausea.   Genitourinary: Negative for dysuria, hematuria and nocturia.   Neurological: Positive for dizziness. Negative for excessive daytime sleepiness, headaches, light-headedness, loss of balance, numbness and vertigo.     Objective:     Vital Signs (Most Recent):  Temp: 98.6 °F (37 °C) (06/18/18 1150)  Pulse: 82  (06/18/18 1513)  Resp: 18 (06/18/18 1150)  BP: 135/60 (06/18/18 1400)  SpO2: 99 % (06/18/18 1150) Vital Signs (24h Range):  Temp:  [97.5 °F (36.4 °C)-98.6 °F (37 °C)] 98.6 °F (37 °C)  Pulse:  [69-82] 82  Resp:  [18-20] 18  SpO2:  [95 %-99 %] 99 %  BP: ()/(39-78) 135/60     Weight: 55.1 kg (121 lb 7.6 oz)  Body mass index is 22.95 kg/m².    SpO2: 99 %  O2 Device (Oxygen Therapy): room air    No intake or output data in the 24 hours ending 06/18/18 1600    Lines/Drains/Airways     Peripheral Intravenous Line                 Peripheral IV - Single Lumen 06/14/18 Left Antecubital 4 days                Physical Exam   Constitutional: She is oriented to person, place, and time. She appears well-developed and well-nourished.   Neck: Neck supple. No JVD present.   Cardiovascular: Normal rate, regular rhythm, normal heart sounds and normal pulses.  Exam reveals no friction rub.    No murmur heard.  Pulmonary/Chest: Effort normal and breath sounds normal. No respiratory distress. She has no wheezes. She has no rales.   Abdominal: Soft. Bowel sounds are normal. She exhibits no distension.   Musculoskeletal: She exhibits no edema or tenderness.   Neurological: She is alert and oriented to person, place, and time.   Skin: Skin is warm and dry. No rash noted.   Psychiatric: She has a normal mood and affect. Her behavior is normal.   Nursing note and vitals reviewed.      Significant Labs:   All pertinent lab results from the last 24 hours have been reviewed. and   Recent Lab Results       06/18/18  1110 06/18/18  0556 06/18/18  0506 06/17/18  2126 06/17/18  1719      Albumin   3.1(L)       Alkaline Phosphatase   60       ALT   <5(L)       Anion Gap   8       AST   19       Baso #   0.02       Basophil%   0.3       Total Bilirubin   0.6  Comment:  For infants and newborns, interpretation of results should be based  on gestational age, weight and in agreement with clinical  observations.  Premature Infant recommended  reference ranges:  Up to 24 hours.............<8.0 mg/dL  Up to 48 hours............<12.0 mg/dL  3-5 days..................<15.0 mg/dL  6-29 days.................<15.0 mg/dL         BUN, Bld   44(H)       Calcium   9.1       Chloride   103       CO2   28       Creatinine   2.1(H)       Differential Method   Automated       eGFR if    24(A)       eGFR if non    21  Comment:  Calculation used to obtain the estimated glomerular filtration  rate (eGFR) is the CKD-EPI equation.   (A)       Eos #   0.4       Eosinophil%   5.9       Glucose   109       Gran # (ANC)   3.0       Gran%   48.0       Hematocrit   25.9(L)       Hemoglobin   8.3(L)       Lymph #   2.3       Lymph%   37.1       Magnesium   2.5       MCH   32.4(H)       MCHC   32.0       MCV   101(H)       Mono #   0.5       Mono%   8.7       MPV   11.1       Phosphorus   4.1       Platelets   210       POCT Glucose 212(H) 103  109 112(H)     Potassium   4.4       Total Protein   6.3       RBC   2.56(L)       RDW   18.3(H)       Sodium   139       WBC   6.22                       Significant Imaging: Echocardiogram:   2D echo with color flow doppler:   Results for orders placed or performed during the hospital encounter of 06/14/18   2D echo with color flow doppler   Result Value Ref Range    EF 60 55 - 65    Mitral Valve Regurgitation MILD     Diastolic Dysfunction Yes (A)     Est. PA Systolic Pressure 35.28     and X-Ray: CXR: X-Ray Chest 1 View (CXR):   Results for orders placed or performed during the hospital encounter of 06/14/18   X-Ray Chest 1 View    Narrative    EXAMINATION:  XR CHEST 1 VIEW    CLINICAL HISTORY:  weakness;    COMPARISON:  12/06/2014    FINDINGS:  The size of the heart is prominent.  There is no acute pulmonary process visualized.  There are calcific densities projected over the right breast and right hemithorax.  There has been interval opacification of the base of the left hemithorax.  There is no  pneumothorax.  The right costophrenic angle is sharp.  There is a mild amount of dextroconvex scoliosis of the thoracic spine.      Impression    1. There has been interval opacification of the base of the left hemithorax.  This is characteristic of a small left pleural effusion.  2. There is no acute pulmonary process visualized.  3. There is a mild amount of dextroconvex scoliosis of the thoracic spine.  .      Electronically signed by: Jonathon Payne MD  Date:    06/14/2018  Time:    11:31    and X-Ray Chest PA and Lateral (CXR): No results found for this visit on 06/14/18.

## 2018-06-18 NOTE — PROGRESS NOTES
Pt had hypotensive event while sitting in chair after therapy. Pt turned pale and minimally responsive and clammy, SBP in 80's. NP ti came to bedside. Took pt's bp and 84/39, once she got back to bed, pts bp returned to 135/60. Pt is re oriented and speaking normally asking me for a blanket. No new orders at this time, will continue to monitor.

## 2018-06-18 NOTE — PLAN OF CARE
[facility] reviewing for acceptance.  [facility] will notify if they can accept.    [employer] called to [place] [phone]    Level I faxed to [employer] @[phone]    @[phone]  received form 142.      @1431  received a message from [facility].  Patient is accepted at [facility] and they have requested authorization from [facility].  [facility] will contact  when authorization is received.

## 2018-06-18 NOTE — TELEPHONE ENCOUNTER
----- Message from Agueda Gonzalez sent at 6/18/2018  2:30 PM CDT -----  Contact: Tiffanie Landis/daughter  States she's calling to let us know she is in the hospital and she would like to see if Dr CASSANDRA Oquendo can come see her. States she keeps having these episodes, she's blacking out and she's unconscious. Please call Tiffanie at [phone]. Thank you

## 2018-06-18 NOTE — CONSULTS
[facility]  Cardiology  Consult Note    Patient Name: Brynn Augustine  MRN: [id number]  Admission Date: 6/14/2018  Hospital Length of Stay: 1 days  Code Status: DNR   Attending Provider: Adonis Meade MD   Consulting Provider: LORENA Cruz  Primary Care Physician: Gabby Carrera MD  Principal Problem:Syncope    Patient information was obtained from patient, relative(s) and ER records.     Inpatient consult to Cardiology  Consult performed by: WILVER LEDEZMA  Consult ordered by: LUCITA MCBRIDE  Reason for consult: syncope         Subjective:     Chief Complaint:  Syncopal episode      HPI:   Brynn Augustine is an 87 year old female with diabetes mellitus type II, hypertension and stage IV chronic kidney disease who presents to the ED with reports of a fall at home. The patient's son reports that he found her laying in her bath tub for approximately 6 hours. He suspects she fell into the tub while attempting to use the restroom during the night. The patient reports no complaints other than feeling fatigued. She denies pain. She is at her normal baseline health and does a history of chronic vision loss. She is oriented to person and place but does not time. She lives alone, but he checks on her daily. In the ED, the patient had an increased CPK of 419, CPK MB of 22.0, and BNP of 1,313.   Creatine up to 2.1 from 1.4 on admit. H/H down to 8.3/25.9. BNP 1000. Patient denies any chest pain or SOB. Did have dizziness this morning and has occasional dizziness with position change. EKG shows nonspecific ST changes.  20-50 on right and 50-59% stenosis. Head CT negative. +Orthostatic vitals. Follows with Nephrology as an OP.     Past Medical History:   Diagnosis Date    Anemia     Arthritis     Back pain     Breast cancer 2008    right    Diabetes with neurologic complications     Glaucoma     Gout     Hyperlipidemia     Hypertension     Hypoglycemia 12/6/2014    Kidney disease,  chronic, stage IV (GFR 15-29 ml/min)     Renal manifestation of secondary diabetes mellitus     Retinitis pigmentosa 05/04/2016    outside eye exam (No BDR)    Skin cancer     several       Past Surgical History:   Procedure Laterality Date    APPENDECTOMY  1964    incidental with choly    BREAST SURGERY Right 08/13/2008    excisional Bx =infiltrating ductal ca, low grade ;calcifications identified within benign and malignant ducts.    BREAST SURGERY Right 09/09/2008    sentinal node and lymoh node dissection    CHOLECYSTECTOMY  1964    EYE SURGERY Bilateral 2006    IOL ou ( 2006) and YAGOD    HYSTERECTOMY  1971    Moh's  procedure Left 04/25/2011    BCC, left chin; BSC on Lt upper lip, BCC lt side of nose (dR FELIZ)    SPINE SURGERY  1975    Lumbosacral disc       Review of patient's allergies indicates:   Allergen Reactions    Ace inhibitors      Caused hyperkalemia    Arb-angiotensin receptor antagonist Other (See Comments)     hyperkalemia    Codeine Nausea Only    Morphine Nausea Only       No current facility-administered medications on file prior to encounter.      Current Outpatient Prescriptions on File Prior to Encounter   Medication Sig    allopurinol (ZYLOPRIM) 100 MG tablet TAKE 2 TABLETS BY MOUTH ONCE DAILY    amLODIPine (NORVASC) 5 MG tablet Take 1 tablet (5 mg total) by mouth once daily.    blood sugar diagnostic (ONETOUCH ULTRA TEST) Strp TEST BLOOD SUGAR EVERY DAY AS DIRECTED    blood sugar diagnostic Strp 1 strip by Misc.(Non-Drug; Combo Route) route 3 (three) times daily.    blood-glucose meter kit Use as instructed    carvedilol (COREG) 12.5 MG tablet TAKE 1 TABLET BY MOUTH TWICE DAILY    ergocalciferol (ERGOCALCIFEROL) 50,000 unit Cap Take 1 capsule by mouth every 7 days.    ergocalciferol (ERGOCALCIFEROL) 50,000 unit Cap TAKE 1 CAPSULE BY MOUTH EVERY 7 DAYS    furosemide (LASIX) 40 MG tablet Take 40 mg by mouth as needed.    lancets Misc 1 Stick by Misc.(Non-Drug;  Combo Route) route 3 (three) times daily.    lancing device with lancets Kit 1 each by Misc.(Non-Drug; Combo Route) route 3 (three) times daily.     Family History     Problem Relation (Age of Onset)    Cancer Mother, Father    Diabetes Sister    Heart disease Brother, Brother    Stroke Brother        Social History Main Topics    Smoking status: Never Smoker    Smokeless tobacco: Never Used    Alcohol use No    Drug use: No    Sexual activity: No     Review of Systems   Constitution: Positive for weakness. Negative for diaphoresis, malaise/fatigue, weight gain and weight loss.   HENT: Negative for congestion and nosebleeds.    Cardiovascular: Positive for syncope. Negative for chest pain, claudication, cyanosis, dyspnea on exertion, irregular heartbeat, leg swelling, near-syncope, orthopnea, palpitations and paroxysmal nocturnal dyspnea.   Respiratory: Negative for cough, hemoptysis, shortness of breath, sleep disturbances due to breathing, snoring, sputum production and wheezing.    Hematologic/Lymphatic: Negative for bleeding problem. Does not bruise/bleed easily.   Skin: Negative for rash.   Musculoskeletal: Positive for falls. Negative for arthritis, back pain, joint pain, muscle cramps and muscle weakness.   Gastrointestinal: Negative for abdominal pain, constipation, diarrhea, heartburn, hematemesis, hematochezia, melena and nausea.   Genitourinary: Negative for dysuria, hematuria and nocturia.   Neurological: Positive for dizziness. Negative for excessive daytime sleepiness, headaches, light-headedness, loss of balance, numbness and vertigo.     Objective:     Vital Signs (Most Recent):  Temp: 98.6 °F (37 °C) (06/18/18 1150)  Pulse: 82 (06/18/18 1513)  Resp: 18 (06/18/18 1150)  BP: 135/60 (06/18/18 1400)  SpO2: 99 % (06/18/18 1150) Vital Signs (24h Range):  Temp:  [97.5 °F (36.4 °C)-98.6 °F (37 °C)] 98.6 °F (37 °C)  Pulse:  [69-82] 82  Resp:  [18-20] 18  SpO2:  [95 %-99 %] 99 %  BP: ()/(39-78)  135/60     Weight: 55.1 kg (121 lb 7.6 oz)  Body mass index is 22.95 kg/m².    SpO2: 99 %  O2 Device (Oxygen Therapy): room air    No intake or output data in the 24 hours ending 06/18/18 1600    Lines/Drains/Airways     Peripheral Intravenous Line                 Peripheral IV - Single Lumen 06/14/18 Left Antecubital 4 days                Physical Exam   Constitutional: She is oriented to person, place, and time. She appears well-developed and well-nourished.   Neck: Neck supple. No JVD present.   Cardiovascular: Normal rate, regular rhythm, normal heart sounds and normal pulses.  Exam reveals no friction rub.    No murmur heard.  Pulmonary/Chest: Effort normal and breath sounds normal. No respiratory distress. She has no wheezes. She has no rales.   Abdominal: Soft. Bowel sounds are normal. She exhibits no distension.   Musculoskeletal: She exhibits no edema or tenderness.   Neurological: She is alert and oriented to person, place, and time.   Skin: Skin is warm and dry. No rash noted.   Psychiatric: She has a normal mood and affect. Her behavior is normal.   Nursing note and vitals reviewed.      Significant Labs:   All pertinent lab results from the last 24 hours have been reviewed. and   Recent Lab Results       06/18/18  1110 06/18/18  0556 06/18/18  0506 06/17/18  2126 06/17/18  1719      Albumin   3.1(L)       Alkaline Phosphatase   60       ALT   <5(L)       Anion Gap   8       AST   19       Baso #   0.02       Basophil%   0.3       Total Bilirubin   0.6  Comment:  For infants and newborns, interpretation of results should be based  on gestational age, weight and in agreement with clinical  observations.  Premature Infant recommended reference ranges:  Up to 24 hours.............<8.0 mg/dL  Up to 48 hours............<12.0 mg/dL  3-5 days..................<15.0 mg/dL  6-29 days.................<15.0 mg/dL         BUN, Bld   44(H)       Calcium   9.1       Chloride   103       CO2   28       Creatinine    2.1(H)       Differential Method   Automated       eGFR if    24(A)       eGFR if non    21  Comment:  Calculation used to obtain the estimated glomerular filtration  rate (eGFR) is the CKD-EPI equation.   (A)       Eos #   0.4       Eosinophil%   5.9       Glucose   109       Gran # (ANC)   3.0       Gran%   48.0       Hematocrit   25.9(L)       Hemoglobin   8.3(L)       Lymph #   2.3       Lymph%   37.1       Magnesium   2.5       MCH   32.4(H)       MCHC   32.0       MCV   101(H)       Mono #   0.5       Mono%   8.7       MPV   11.1       Phosphorus   4.1       Platelets   210       POCT Glucose 212(H) 103  109 112(H)     Potassium   4.4       Total Protein   6.3       RBC   2.56(L)       RDW   18.3(H)       Sodium   139       WBC   6.22                       Significant Imaging: Echocardiogram:   2D echo with color flow doppler:   Results for orders placed or performed during the hospital encounter of 06/14/18   2D echo with color flow doppler   Result Value Ref Range    EF 60 55 - 65    Mitral Valve Regurgitation MILD     Diastolic Dysfunction Yes (A)     Est. PA Systolic Pressure 35.28     and X-Ray: CXR: X-Ray Chest 1 View (CXR):   Results for orders placed or performed during the hospital encounter of 06/14/18   X-Ray Chest 1 View    Narrative    EXAMINATION:  XR CHEST 1 VIEW    CLINICAL HISTORY:  weakness;    COMPARISON:  12/06/2014    FINDINGS:  The size of the heart is prominent.  There is no acute pulmonary process visualized.  There are calcific densities projected over the right breast and right hemithorax.  There has been interval opacification of the base of the left hemithorax.  There is no pneumothorax.  The right costophrenic angle is sharp.  There is a mild amount of dextroconvex scoliosis of the thoracic spine.      Impression    1. There has been interval opacification of the base of the left hemithorax.  This is characteristic of a small left pleural  effusion.  2. There is no acute pulmonary process visualized.  3. There is a mild amount of dextroconvex scoliosis of the thoracic spine.  .      Electronically signed by: Jonathon Payne MD  Date:    06/14/2018  Time:    11:31    and X-Ray Chest PA and Lateral (CXR): No results found for this visit on 06/14/18.    Assessment and Plan:     * Syncope    -Syncopal episode likely vasovagal in nature. Was followed by attempting to have a BM  -Also noted to have orthostatic vitals  -Needs to hydrate with IV fluids   -Recommend stopping Coreg to avoid orthostasis.   -Increase Amlodipine to 10mg daily   -Recommend pharm stress test tomorrow to rule out ischemia'  -Carotid US shows no significant stenosis  -head CT negative   -Normal LVF with preserved EF  on echo  -Needs anemia treated. Could have contributed to her syncope and dizziness       Hypertensive urgency    Likely due to missing medication.   Amlodipine has been resumed          CKD (chronic kidney disease), stage IV    Recommend consulting nephrology             VTE Risk Mitigation         Ordered     heparin (porcine) injection 5,000 Units  Every 8 hours      06/14/18 1622     IP VTE HIGH RISK PATIENT  Once      06/14/18 1348      Chart reviewed. Patient examined by Dr. Maguire and agrees with plan that has been outlined.       Thank you for your consult. I will follow-up with patient. Please contact us if you have any additional questions.    LORENA Cruz  Cardiology   [facility]

## 2018-06-18 NOTE — ASSESSMENT & PLAN NOTE
-Likely due to a vasovagal episode? Orthostatics   -+orthostatis   -Monitor orthostatics qshift   -Continue IVF  -Chilo Carotid US revealed 20-50% stenosis within the right internal carotid artery and 50-59% stenosis within the left internal carotid artery.

## 2018-06-18 NOTE — ASSESSMENT & PLAN NOTE
-Stable   -Baseline Creatinine 1.8  -Appropriately dose medications and monitor.  -Creatinine 2.1  -Continue IVF

## 2018-06-19 LAB
ABO + RH BLD: NORMAL
ALBUMIN SERPL BCP-MCNC: 2.9 G/DL
ALP SERPL-CCNC: 56 U/L
ALT SERPL W/O P-5'-P-CCNC: 9 U/L
ANION GAP SERPL CALC-SCNC: 6 MMOL/L
AST SERPL-CCNC: 28 U/L
BASOPHILS # BLD AUTO: 0.01 K/UL
BASOPHILS NFR BLD: 0.2 %
BILIRUB SERPL-MCNC: 0.6 MG/DL
BLD GP AB SCN CELLS X3 SERPL QL: NORMAL
BLD PROD TYP BPU: NORMAL
BLOOD GROUP ANTIBODIES SERPL: NORMAL
BLOOD UNIT EXPIRATION DATE: NORMAL
BLOOD UNIT TYPE CODE: 9500
BLOOD UNIT TYPE: NORMAL
BUN SERPL-MCNC: 40 MG/DL
CALCIUM SERPL-MCNC: 8.6 MG/DL
CHLORIDE SERPL-SCNC: 108 MMOL/L
CO2 SERPL-SCNC: 27 MMOL/L
CODING SYSTEM: NORMAL
CREAT SERPL-MCNC: 1.8 MG/DL
DIFFERENTIAL METHOD: ABNORMAL
DISPENSE STATUS: NORMAL
EOSINOPHIL # BLD AUTO: 0.3 K/UL
EOSINOPHIL NFR BLD: 5.2 %
ERYTHROCYTE [DISTWIDTH] IN BLOOD BY AUTOMATED COUNT: 18 %
EST. GFR  (AFRICAN AMERICAN): 29 ML/MIN/1.73 M^2
EST. GFR  (NON AFRICAN AMERICAN): 25 ML/MIN/1.73 M^2
GLUCOSE SERPL-MCNC: 101 MG/DL
HCT VFR BLD AUTO: 21.8 %
HGB BLD-MCNC: 7.1 G/DL
LYMPHOCYTES # BLD AUTO: 2.4 K/UL
LYMPHOCYTES NFR BLD: 45.6 %
MAGNESIUM SERPL-MCNC: 2.3 MG/DL
MCH RBC QN AUTO: 32.4 PG
MCHC RBC AUTO-ENTMCNC: 32.6 G/DL
MCV RBC AUTO: 100 FL
MONOCYTES # BLD AUTO: 0.5 K/UL
MONOCYTES NFR BLD: 9 %
NEUTROPHILS # BLD AUTO: 2.1 K/UL
NEUTROPHILS NFR BLD: 40 %
NUM UNITS TRANS PACKED RBC: NORMAL
PATHOLOGIST INTERPRETATION AB/XM: NORMAL
PHOSPHATE SERPL-MCNC: 3.4 MG/DL
PLATELET # BLD AUTO: 196 K/UL
PMV BLD AUTO: 10.5 FL
POCT GLUCOSE: 102 MG/DL (ref 70–110)
POCT GLUCOSE: 108 MG/DL (ref 70–110)
POCT GLUCOSE: 154 MG/DL (ref 70–110)
POTASSIUM SERPL-SCNC: 4.1 MMOL/L
PROT SERPL-MCNC: 5.5 G/DL
RBC # BLD AUTO: 2.19 M/UL
SODIUM SERPL-SCNC: 141 MMOL/L
WBC # BLD AUTO: 5.22 K/UL

## 2018-06-19 PROCEDURE — 36430 TRANSFUSION BLD/BLD COMPNT: CPT

## 2018-06-19 PROCEDURE — 25000003 PHARM REV CODE 250: Performed by: FAMILY MEDICINE

## 2018-06-19 PROCEDURE — 21400001 HC TELEMETRY ROOM

## 2018-06-19 PROCEDURE — 97530 THERAPEUTIC ACTIVITIES: CPT

## 2018-06-19 PROCEDURE — 80053 COMPREHEN METABOLIC PANEL: CPT

## 2018-06-19 PROCEDURE — 86870 RBC ANTIBODY IDENTIFICATION: CPT

## 2018-06-19 PROCEDURE — 86901 BLOOD TYPING SEROLOGIC RH(D): CPT

## 2018-06-19 PROCEDURE — 86077 PHYS BLOOD BANK SERV XMATCH: CPT | Mod: S$GLB,,, | Performed by: PATHOLOGY

## 2018-06-19 PROCEDURE — P9016 RBC LEUKOCYTES REDUCED: HCPCS

## 2018-06-19 PROCEDURE — 97110 THERAPEUTIC EXERCISES: CPT

## 2018-06-19 PROCEDURE — 36415 COLL VENOUS BLD VENIPUNCTURE: CPT

## 2018-06-19 PROCEDURE — 86922 COMPATIBILITY TEST ANTIGLOB: CPT

## 2018-06-19 PROCEDURE — 86905 BLOOD TYPING RBC ANTIGENS: CPT

## 2018-06-19 PROCEDURE — 25000003 PHARM REV CODE 250: Performed by: NURSE PRACTITIONER

## 2018-06-19 PROCEDURE — 83735 ASSAY OF MAGNESIUM: CPT

## 2018-06-19 PROCEDURE — 85025 COMPLETE CBC W/AUTO DIFF WBC: CPT

## 2018-06-19 PROCEDURE — 63600175 PHARM REV CODE 636 W HCPCS: Performed by: PHYSICIAN ASSISTANT

## 2018-06-19 PROCEDURE — 84100 ASSAY OF PHOSPHORUS: CPT

## 2018-06-19 PROCEDURE — 99232 SBSQ HOSP IP/OBS MODERATE 35: CPT | Mod: ,,, | Performed by: INTERNAL MEDICINE

## 2018-06-19 PROCEDURE — 25000003 PHARM REV CODE 250: Performed by: PHYSICIAN ASSISTANT

## 2018-06-19 RX ORDER — SODIUM CHLORIDE 0.9 % (FLUSH) 0.9 %
5 SYRINGE (ML) INJECTION
Status: DISCONTINUED | OUTPATIENT
Start: 2018-06-19 | End: 2018-06-20 | Stop reason: HOSPADM

## 2018-06-19 RX ORDER — HYDROCODONE BITARTRATE AND ACETAMINOPHEN 500; 5 MG/1; MG/1
TABLET ORAL
Status: DISCONTINUED | OUTPATIENT
Start: 2018-06-19 | End: 2018-06-20 | Stop reason: HOSPADM

## 2018-06-19 RX ADMIN — HEPARIN SODIUM 5000 UNITS: 5000 INJECTION, SOLUTION INTRAVENOUS; SUBCUTANEOUS at 09:06

## 2018-06-19 RX ADMIN — MAGNESIUM OXIDE TAB 400 MG (241.3 MG ELEMENTAL MG) 400 MG: 400 (241.3 MG) TAB at 08:06

## 2018-06-19 RX ADMIN — PANTOPRAZOLE SODIUM 40 MG: 40 TABLET, DELAYED RELEASE ORAL at 08:06

## 2018-06-19 RX ADMIN — ALLOPURINOL 200 MG: 100 TABLET ORAL at 08:06

## 2018-06-19 RX ADMIN — MAGNESIUM OXIDE TAB 400 MG (241.3 MG ELEMENTAL MG) 400 MG: 400 (241.3 MG) TAB at 09:06

## 2018-06-19 RX ADMIN — AMLODIPINE BESYLATE 10 MG: 10 TABLET ORAL at 08:06

## 2018-06-19 RX ADMIN — HEPARIN SODIUM 5000 UNITS: 5000 INJECTION, SOLUTION INTRAVENOUS; SUBCUTANEOUS at 01:06

## 2018-06-19 NOTE — SUBJECTIVE & OBJECTIVE
Interval History: Patient seen and examined today. Vital signs stable, renal function improved. She is having having anemia, will transfuse on unit of PRBC.     Review of Systems   Constitutional: Positive for fatigue. Negative for chills and fever.   HENT: Negative for congestion, rhinorrhea and sinus pressure.    Respiratory: Negative for apnea, cough, choking, chest tightness, shortness of breath, wheezing and stridor.    Cardiovascular: Negative for chest pain, palpitations and leg swelling.   Gastrointestinal: Negative for abdominal distention, abdominal pain, diarrhea, nausea and vomiting.   Endocrine: Negative for cold intolerance and heat intolerance.   Genitourinary: Negative for difficulty urinating and hematuria.   Musculoskeletal: Negative for arthralgias and joint swelling.   Skin: Negative for color change, pallor and rash.   Neurological: Positive for weakness. Negative for dizziness, seizures, numbness and headaches.   Psychiatric/Behavioral: Negative for agitation. The patient is not nervous/anxious.      Objective:     Vital Signs (Most Recent):  Temp: 98.2 °F (36.8 °C) (06/19/18 1140)  Pulse: 78 (06/19/18 1140)  Resp: 18 (06/19/18 1140)  BP: (!) 140/65 (06/19/18 1140)  SpO2: 96 % (06/19/18 1140) Vital Signs (24h Range):  Temp:  [97.3 °F (36.3 °C)-98.6 °F (37 °C)] 98.2 °F (36.8 °C)  Pulse:  [70-82] 78  Resp:  [16-18] 18  SpO2:  [96 %-99 %] 96 %  BP: ()/(39-83) 140/65     Weight: 55.9 kg (123 lb 3.8 oz)  Body mass index is 23.29 kg/m².    Intake/Output Summary (Last 24 hours) at 06/19/18 1311  Last data filed at 06/19/18 0600   Gross per 24 hour   Intake          3308.33 ml   Output              300 ml   Net          3008.33 ml      Physical Exam   Constitutional: She is oriented to person, place, and time. No distress.   HENT:   Mouth/Throat: No oropharyngeal exudate.   Eyes: Right eye exhibits no discharge. Left eye exhibits no discharge.   Neck: No JVD present.   Cardiovascular: Exam  reveals no friction rub.    No murmur heard.  Pulmonary/Chest: Effort normal. No respiratory distress. She has decreased breath sounds in the left lower field. She has no wheezes. She has no rales. She exhibits no tenderness.   Abdominal: She exhibits no distension and no mass. There is no tenderness. There is no rebound and no guarding. No hernia.   Musculoskeletal: She exhibits no edema or deformity.   Neurological: She is alert and oriented to person, place, and time.   Skin: Skin is warm and dry. Capillary refill takes less than 2 seconds. She is not diaphoretic. There is pallor.   Psychiatric: She has a normal mood and affect. Her behavior is normal. Judgment and thought content normal.   Nursing note and vitals reviewed.      Significant Labs:   CBC:   Recent Labs  Lab 06/18/18  0506 06/19/18  0409   WBC 6.22 5.22   HGB 8.3* 7.1*   HCT 25.9* 21.8*    196     CMP:   Recent Labs  Lab 06/18/18  0506 06/19/18  0409    141   K 4.4 4.1    108   CO2 28 27    101   BUN 44* 40*   CREATININE 2.1* 1.8*   CALCIUM 9.1 8.6*   PROT 6.3 5.5*   ALBUMIN 3.1* 2.9*   BILITOT 0.6 0.6   ALKPHOS 60 56   AST 19 28   ALT <5* 9*   ANIONGAP 8 6*   EGFRNONAA 21* 25*       Significant Imaging:     Imaging Results          X-Ray Chest 1 View (Final result)  Result time 06/14/18 11:31:54    Final result by ISIDORO Payne Sr., MD (06/14/18 11:31:54)                 Impression:      1. There has been interval opacification of the base of the left hemithorax.  This is characteristic of a small left pleural effusion.  2. There is no acute pulmonary process visualized.  3. There is a mild amount of dextroconvex scoliosis of the thoracic spine.  .      Electronically signed by: Jonathon Payne MD  Date:    06/14/2018  Time:    11:31             Narrative:    EXAMINATION:  XR CHEST 1 VIEW    CLINICAL HISTORY:  weakness;    COMPARISON:  12/06/2014    FINDINGS:  The size of the heart is prominent.  There is no acute  pulmonary process visualized.  There are calcific densities projected over the right breast and right hemithorax.  There has been interval opacification of the base of the left hemithorax.  There is no pneumothorax.  The right costophrenic angle is sharp.  There is a mild amount of dextroconvex scoliosis of the thoracic spine.                               CT Head Without Contrast (Final result)  Result time 06/14/18 11:28:56    Final result by ISIDORO Payne Sr., MD (06/14/18 11:28:56)                 Impression:      1. There is no evidence of an acute ischemic event. There are moderate bilateral periventricular ischemic white matter changes.  2. There is no acute appearing intracranial hemorrhage.  All CT scans at this facility use dose modulation, iterative reconstruction, and/or weight base dosing when appropriate to reduce radiation dose when appropriate to reduce radiation dose to as low as reasonably achievable.      Electronically signed by: Jonathon Payne MD  Date:    06/14/2018  Time:    11:28             Narrative:    EXAMINATION:  CT HEAD WITHOUT CONTRAST    CLINICAL HISTORY:  WeaknessDizziness;    TECHNIQUE:  Standard brain CT protocol without IV contrast was performed.    COMPARISON:  06/14/2017    FINDINGS:  There is no evidence of an acute ischemic event.  There are moderate bilateral periventricular ischemic white matter changes.  There is no acute appearing intracranial hemorrhage.  There is no calvarial fracture.  The visualized portion of the paranasal sinuses is clear.

## 2018-06-19 NOTE — PLAN OF CARE
Problem: Physical Therapy Goal  Goal: Physical Therapy Goal  PT WILL BE SEEN FOR P.T. FOR A MIN OF 5 OUT OF 7 DAYS A WEEK  LT18  1. PT WILL COMPLETE BED MOBILITY MARICARMEN  2. PT WILL T/F TO CHAIR WITH RW AND CGA  3. PT WILL GT TRAIN X 150' WITH RW AND CGA  4. PT WILL COMPLETE B LE TE X 20 REPS   Outcome: Ongoing (interventions implemented as appropriate)  Patient increased amb distance today.  Progressing toward all goals.

## 2018-06-19 NOTE — PT/OT/SLP PROGRESS
Occupational Therapy  Treatment    Brynn Augustine   MRN: [id number]   Admitting Diagnosis: Syncope    OT Date of Treatment: 06/19/18   OT Start Time: 1330  OT Stop Time: 1400  OT Total Time (min): 30 min    Billable Minutes:  Therapeutic Activity 15 minutes and Therapeutic Exercise 15 minutes    General Precautions: Standard, fall  Orthopedic Precautions: N/A  Braces: N/A         Subjective:  Communicated with nurse and epic chart review prior to session.    Pain/Comfort  Pain Rating 1: 0/10    Objective:  Patient found with: telemetry, peripheral IV     Functional Mobility:  Bed Mobility:       Transfers:        Functional Ambulation:na    Activities of Daily Living:     Feeding adaptive equipment: na     UE adaptive equipment: na     LE adaptive equipment: na                    Bathing adaptive equipment: na    Balance:   Static Sit: FAIR: Maintains without assist, but unable to take any challenges   Dynamic Sit: poor-  Static Stand: na  Dynamic stand: 0: N/A    Therapeutic Activities and Exercises:  Pt seen in room sitting eob with fair static and poor-dynamic sitting balance. Pt req max verbal cuessto adhere to task . Pt performed 21 set x 10 reps b ue shoulder flexion, elbow flex/ ext and le exercises in all available planes and ranges. Pt performed reaching activities and in various planes and ranges to facilitate correct posture.     AM-PAC 6 CLICK ADL   How much help from another person does this patient currently need?   1 = Unable, Total/Dependent Assistance  2 = A lot, Maximum/Moderate Assistance  3 = A little, Minimum/Contact Guard/Supervision  4 = None, Modified [place]/Independent    Putting on and taking off regular lower body clothing? : 3  Bathing (including washing, rinsing, drying)?: 2  Toileting, which includes using toilet, bedpan, or urinal? : 3  Putting on and taking off regular upper body clothing?: 3  Taking care of personal grooming such as brushing teeth?: 3  Eating meals?: 4  Total  "Score: 18     AM-PAC Raw Score CMS "G-Code Modifier Level of Impairment Assistance   6 % Total / Unable   7 - 8 CM 80 - 100% Maximal Assist   9-13 CL 60 - 80% Moderate Assist   14 - 19 CK 40 - 60% Moderate Assist   20 - 22 CJ 20 - 40% Minimal Assist   23 CI 1-20% SBA / CGA   24 CH 0% Independent/ Mod I       Patient left HOB elevated with all lines intact, call button in reach, nurse notified and family present    ASSESSMENT:  Brynn Augustine is a 87 y.o. female with a medical diagnosis of Syncope and presents with debility and generalized weakness. Pt will continue to benefit from skilled o.t..    Rehab identified problem list/impairments: Rehab identified problem list/impairments: impaired self care skills, weakness, impaired balance, decreased safety awareness, impaired functional mobilty, decreased upper extremity function, pain, gait instability, decreased lower extremity function    Rehab potential is good.    Activity tolerance: Fair    Discharge recommendations: Discharge Facility/Level Of Care Needs: nursing facility, skilled     Barriers to discharge: Barriers to Discharge: Decreased caregiver support    Equipment recommendations: none     GOALS:    Occupational Therapy Goals        Problem: Occupational Therapy Goal    Goal Priority Disciplines Outcome Interventions   Occupational Therapy Goal     OT, PT/OT Ongoing (interventions implemented as appropriate)    Description:  Goals to be met by: 6/22/18     Patient will increase functional independence with ADLs by performing:    UE Dressing with Set-up Assistance.  LE Dressing with Stand-by Assistance.  Grooming while standing with Minimal Assistance.  Toileting from toilet with Minimal Assistance for hygiene and clothing management.   Toilet transfer to toilet with Minimal Assistance.  Upper extremity exercise program x10 reps per handout, with assistance as needed.                      Plan:  Patient to be seen 3 x/week to address the above listed " problems via self-care/home management, therapeutic activities, therapeutic exercises  Plan of Care expires: 06/22/18  Plan of Care reviewed with: patient         Lindsay Mcneil, OT  06/19/2018

## 2018-06-19 NOTE — ASSESSMENT & PLAN NOTE
-Syncopal episode likely vasovagal in nature. Was followed by attempting to have a BM  -Also noted to have orthostatic vitals  -Needs to hydrate  - Coreg stopped to avoid orthostasis.   -Continue  Amlodipine to 10mg daily   -stress test canceled due to anemia that needs to be treated. May need to be done as an OP   -Carotid US shows no significant stenosis  -head CT negative

## 2018-06-19 NOTE — ASSESSMENT & PLAN NOTE
Probable due to chronic renal disease   H & H 7.1/ 21.8  Transfuse one unit of PRBC  Monitor

## 2018-06-19 NOTE — SUBJECTIVE & OBJECTIVE
Review of Systems   Constitution: Positive for weakness. Negative for diaphoresis, malaise/fatigue, weight gain and weight loss.   HENT: Negative for congestion and nosebleeds.    Cardiovascular: Negative for chest pain, claudication, cyanosis, dyspnea on exertion, irregular heartbeat, leg swelling, near-syncope, orthopnea, palpitations, paroxysmal nocturnal dyspnea and syncope.   Respiratory: Negative for cough, hemoptysis, shortness of breath, sleep disturbances due to breathing, snoring, sputum production and wheezing.    Hematologic/Lymphatic: Negative for bleeding problem. Does not bruise/bleed easily.   Skin: Negative for rash.   Musculoskeletal: Negative for arthritis, back pain, falls, joint pain, muscle cramps and muscle weakness.   Gastrointestinal: Negative for abdominal pain, constipation, diarrhea, heartburn, hematemesis, hematochezia, melena and nausea.   Genitourinary: Negative for dysuria, hematuria and nocturia.   Neurological: Negative for excessive daytime sleepiness, dizziness, headaches, light-headedness, loss of balance, numbness and vertigo.     Objective:     Vital Signs (Most Recent):  Temp: 98.4 °F (36.9 °C) (06/19/18 0801)  Pulse: 77 (06/19/18 0915)  Resp: 18 (06/19/18 0801)  BP: (!) 174/78 (06/19/18 0801)  SpO2: 96 % (06/19/18 0801) Vital Signs (24h Range):  Temp:  [97.3 °F (36.3 °C)-98.6 °F (37 °C)] 98.4 °F (36.9 °C)  Pulse:  [70-82] 77  Resp:  [16-18] 18  SpO2:  [96 %-99 %] 96 %  BP: ()/(39-83) 174/78     Weight: 55.9 kg (123 lb 3.8 oz)  Body mass index is 23.29 kg/m².     SpO2: 96 %  O2 Device (Oxygen Therapy): room air      Intake/Output Summary (Last 24 hours) at 06/19/18 1120  Last data filed at 06/19/18 0600   Gross per 24 hour   Intake          3548.33 ml   Output              300 ml   Net          3248.33 ml       Lines/Drains/Airways     Peripheral Intravenous Line                 Peripheral IV - Single Lumen 06/19/18 0655 Left Forearm less than 1 day                 Physical Exam   Constitutional: She is oriented to person, place, and time. She appears well-developed and well-nourished.   Neck: Neck supple. No JVD present.   Cardiovascular: Normal rate, regular rhythm, normal heart sounds and normal pulses.  Exam reveals no friction rub.    No murmur heard.  Pulmonary/Chest: Effort normal and breath sounds normal. No respiratory distress. She has no wheezes. She has no rales.   Abdominal: Soft. Bowel sounds are normal. She exhibits no distension.   Musculoskeletal: She exhibits no edema or tenderness.   Neurological: She is alert and oriented to person, place, and time.   Skin: Skin is warm and dry. No rash noted.   Psychiatric: She has a normal mood and affect. Her behavior is normal.   Nursing note and vitals reviewed.      Significant Labs:   All pertinent lab results from the last 24 hours have been reviewed. and   Recent Lab Results       06/19/18  0853 06/19/18  0409 06/18/18  2224 06/18/18  1618      Albumin  2.9(L)       Alkaline Phosphatase  56       ALT  9(L)       Anion Gap  6(L)       AST  28       Baso #  0.01       Basophil%  0.2       Total Bilirubin  0.6  Comment:  For infants and newborns, interpretation of results should be based  on gestational age, weight and in agreement with clinical  observations.  Premature Infant recommended reference ranges:  Up to 24 hours.............<8.0 mg/dL  Up to 48 hours............<12.0 mg/dL  3-5 days..................<15.0 mg/dL  6-29 days.................<15.0 mg/dL         BUN, Bld  40(H)       Calcium  8.6(L)       Chloride  108       CO2  27       Creatinine  1.8(H)       Differential Method  Automated       eGFR if   29(A)       eGFR if non   25  Comment:  Calculation used to obtain the estimated glomerular filtration  rate (eGFR) is the CKD-EPI equation.   (A)       Eos #  0.3       Eosinophil%  5.2       Glucose  101       Gran # (ANC)  2.1       Gran%  40.0       Group & Rh B NEG         Hematocrit  21.8(L)       Hemoglobin  7.1(L)       INDIRECT BLAS POS        Lymph #  2.4       Lymph%  45.6       Magnesium  2.3       MCH  32.4(H)       MCHC  32.6       MCV  100(H)       Mono #  0.5       Mono%  9.0       MPV  10.5       Phosphorus  3.4       Platelets  196       POCT Glucose   105 126(H)     Potassium  4.1       Total Protein  5.5(L)       RBC  2.19(L)       RDW  18.0(H)       Sodium  141       WBC  5.22             Significant Imaging: Echocardiogram:   2D echo with color flow doppler:   Results for orders placed or performed during the hospital encounter of 06/14/18   2D echo with color flow doppler   Result Value Ref Range    EF 60 55 - 65    Mitral Valve Regurgitation MILD     Diastolic Dysfunction Yes (A)     Est. PA Systolic Pressure 35.28     and X-Ray: CXR: X-Ray Chest 1 View (CXR):   Results for orders placed or performed during the hospital encounter of 06/14/18   X-Ray Chest 1 View    Narrative    EXAMINATION:  XR CHEST 1 VIEW    CLINICAL HISTORY:  weakness;    COMPARISON:  12/06/2014    FINDINGS:  The size of the heart is prominent.  There is no acute pulmonary process visualized.  There are calcific densities projected over the right breast and right hemithorax.  There has been interval opacification of the base of the left hemithorax.  There is no pneumothorax.  The right costophrenic angle is sharp.  There is a mild amount of dextroconvex scoliosis of the thoracic spine.      Impression    1. There has been interval opacification of the base of the left hemithorax.  This is characteristic of a small left pleural effusion.  2. There is no acute pulmonary process visualized.  3. There is a mild amount of dextroconvex scoliosis of the thoracic spine.  .      Electronically signed by: Jonathon Payne MD  Date:    06/14/2018  Time:    11:31    and X-Ray Chest PA and Lateral (CXR): No results found for this visit on 06/14/18.

## 2018-06-19 NOTE — PLAN OF CARE
Problem: Patient Care Overview  Goal: Plan of Care Review  Outcome: Ongoing (interventions implemented as appropriate)  Pt free of falls during shift. VSS.  PIV intact and infusing one unit of blood as ordered. SR on tele monitor. No syncopal episodes today, mainly bedrest and using bed pan. Blood sugars monitored as ordered. Pt did walk with therapy today with no issues. Pt on room air, no SOB noted. Call light in reach, hourly rounding made, family at bedside, will continue to monitor.

## 2018-06-19 NOTE — HOSPITAL COURSE
6/19/18- Patient noted to have further drop in H/H to 7.1/21.8. Has no dizziness, near syncope or syncope. Denies any chest pain or sob. Creatine improved today. Plans in place for patient to be transfused

## 2018-06-19 NOTE — ASSESSMENT & PLAN NOTE
-Likely due to a vasovagal episode? Orthostatics   -+orthostatis   -Monitor orthostatics qshift   -Chilo Carotid US revealed 20-50% stenosis within the right internal carotid artery and 50-59% stenosis within the left internal carotid artery.    -

## 2018-06-19 NOTE — ASSESSMENT & PLAN NOTE
-Stable   -Baseline Creatinine 1.8  -Appropriately dose medications and monitor.  -Creatinine 2.1 on yesterday, down to 1.8 on today.   -Anemia due to chronic kidney disease

## 2018-06-19 NOTE — PLAN OF CARE
Problem: Patient Care Overview  Goal: Plan of Care Review  Outcome: Ongoing (interventions implemented as appropriate)  POC reviewed with patient, verbalized understanding. Pt remains free from falls. Fall precautions in place. NS on cardiac monitor. VSS. No other complaints at this time. Call bell w/in reach. Reminded to call for assistance. Bed alarm in use and pt daughter is sitting at the bedside. IV fluids infusing per orders. Pt is using the bed pan and is able to let me know when she needs to urinate.

## 2018-06-19 NOTE — PROGRESS NOTES
[facility] -   Cardiology  Progress Note    Patient Name: Brynn Augustine  MRN: [id number]  Admission Date: 6/14/2018  Hospital Length of Stay: 2 days  Code Status: DNR   Attending Physician: Adonis Meade MD   Primary Care Physician: Gabby Carrera MD  Expected Discharge Date:   Principal Problem:Syncope    Subjective:   Brief HPI:    Brynn Augustine is an 87 year old female with diabetes mellitus type II, hypertension and stage IV chronic kidney disease who presents to the ED with reports of a fall at home. The patient's son reports that he found her laying in her bath tub for approximately 6 hours. He suspects she fell into the tub while attempting to use the restroom during the night. The patient reports no complaints other than feeling fatigued. She denies pain. She is at her normal baseline health and does a history of chronic vision loss. She is oriented to person and place but does not time. She lives alone, but he checks on her daily. In the ED, the patient had an increased CPK of 419, CPK MB of 22.0, and BNP of 1,313.   Creatine up to 2.1 from 1.4 on admit. H/H down to 8.3/25.9. BNP 1000. Patient denies any chest pain or SOB. Did have dizziness this morning and has occasional dizziness with position change. EKG shows nonspecific ST changes.  20-50 on right and 50-59% stenosis. Head CT negative. +Orthostatic vitals. Follows with Nephrology as an OP.     Hospital Course:   6/19/18- Patient noted to have further drop in H/H to 7.1/21.8. Has no dizziness, near syncope or syncope. Denies any chest pain or sob. Creatine improved today. Plans in place for patient to be transfused         Review of Systems   Constitution: Positive for weakness. Negative for diaphoresis, malaise/fatigue, weight gain and weight loss.   HENT: Negative for congestion and nosebleeds.    Cardiovascular: Negative for chest pain, claudication, cyanosis, dyspnea on exertion, irregular heartbeat, leg swelling, near-syncope,  orthopnea, palpitations, paroxysmal nocturnal dyspnea and syncope.   Respiratory: Negative for cough, hemoptysis, shortness of breath, sleep disturbances due to breathing, snoring, sputum production and wheezing.    Hematologic/Lymphatic: Negative for bleeding problem. Does not bruise/bleed easily.   Skin: Negative for rash.   Musculoskeletal: Negative for arthritis, back pain, falls, joint pain, muscle cramps and muscle weakness.   Gastrointestinal: Negative for abdominal pain, constipation, diarrhea, heartburn, hematemesis, hematochezia, melena and nausea.   Genitourinary: Negative for dysuria, hematuria and nocturia.   Neurological: Negative for excessive daytime sleepiness, dizziness, headaches, light-headedness, loss of balance, numbness and vertigo.     Objective:     Vital Signs (Most Recent):  Temp: 98.4 °F (36.9 °C) (06/19/18 0801)  Pulse: 77 (06/19/18 0915)  Resp: 18 (06/19/18 0801)  BP: (!) 174/78 (06/19/18 0801)  SpO2: 96 % (06/19/18 0801) Vital Signs (24h Range):  Temp:  [97.3 °F (36.3 °C)-98.6 °F (37 °C)] 98.4 °F (36.9 °C)  Pulse:  [70-82] 77  Resp:  [16-18] 18  SpO2:  [96 %-99 %] 96 %  BP: ()/(39-83) 174/78     Weight: 55.9 kg (123 lb 3.8 oz)  Body mass index is 23.29 kg/m².     SpO2: 96 %  O2 Device (Oxygen Therapy): room air      Intake/Output Summary (Last 24 hours) at 06/19/18 1120  Last data filed at 06/19/18 0600   Gross per 24 hour   Intake          3548.33 ml   Output              300 ml   Net          3248.33 ml       Lines/Drains/Airways     Peripheral Intravenous Line                 Peripheral IV - Single Lumen 06/19/18 0655 Left Forearm less than 1 day                Physical Exam   Constitutional: She is oriented to person, place, and time. She appears well-developed and well-nourished.   Neck: Neck supple. No JVD present.   Cardiovascular: Normal rate, regular rhythm, normal heart sounds and normal pulses.  Exam reveals no friction rub.    No murmur heard.  Pulmonary/Chest: Effort  normal and breath sounds normal. No respiratory distress. She has no wheezes. She has no rales.   Abdominal: Soft. Bowel sounds are normal. She exhibits no distension.   Musculoskeletal: She exhibits no edema or tenderness.   Neurological: She is alert and oriented to person, place, and time.   Skin: Skin is warm and dry. No rash noted.   Psychiatric: She has a normal mood and affect. Her behavior is normal.   Nursing note and vitals reviewed.      Significant Labs:   All pertinent lab results from the last 24 hours have been reviewed. and   Recent Lab Results       06/19/18  0853 06/19/18  0409 06/18/18  2224 06/18/18  1618      Albumin  2.9(L)       Alkaline Phosphatase  56       ALT  9(L)       Anion Gap  6(L)       AST  28       Baso #  0.01       Basophil%  0.2       Total Bilirubin  0.6  Comment:  For infants and newborns, interpretation of results should be based  on gestational age, weight and in agreement with clinical  observations.  Premature Infant recommended reference ranges:  Up to 24 hours.............<8.0 mg/dL  Up to 48 hours............<12.0 mg/dL  3-5 days..................<15.0 mg/dL  6-29 days.................<15.0 mg/dL         BUN, Bld  40(H)       Calcium  8.6(L)       Chloride  108       CO2  27       Creatinine  1.8(H)       Differential Method  Automated       eGFR if   29(A)       eGFR if non   25  Comment:  Calculation used to obtain the estimated glomerular filtration  rate (eGFR) is the CKD-EPI equation.   (A)       Eos #  0.3       Eosinophil%  5.2       Glucose  101       Gran # (ANC)  2.1       Gran%  40.0       Group & Rh B NEG        Hematocrit  21.8(L)       Hemoglobin  7.1(L)       INDIRECT BLAS POS        Lymph #  2.4       Lymph%  45.6       Magnesium  2.3       MCH  32.4(H)       MCHC  32.6       MCV  100(H)       Mono #  0.5       Mono%  9.0       MPV  10.5       Phosphorus  3.4       Platelets  196       POCT Glucose   105 126(H)      Potassium  4.1       Total Protein  5.5(L)       RBC  2.19(L)       RDW  18.0(H)       Sodium  141       WBC  5.22             Significant Imaging: Echocardiogram:   2D echo with color flow doppler:   Results for orders placed or performed during the hospital encounter of 06/14/18   2D echo with color flow doppler   Result Value Ref Range    EF 60 55 - 65    Mitral Valve Regurgitation MILD     Diastolic Dysfunction Yes (A)     Est. PA Systolic Pressure 35.28     and X-Ray: CXR: X-Ray Chest 1 View (CXR):   Results for orders placed or performed during the hospital encounter of 06/14/18   X-Ray Chest 1 View    Narrative    EXAMINATION:  XR CHEST 1 VIEW    CLINICAL HISTORY:  weakness;    COMPARISON:  12/06/2014    FINDINGS:  The size of the heart is prominent.  There is no acute pulmonary process visualized.  There are calcific densities projected over the right breast and right hemithorax.  There has been interval opacification of the base of the left hemithorax.  There is no pneumothorax.  The right costophrenic angle is sharp.  There is a mild amount of dextroconvex scoliosis of the thoracic spine.      Impression    1. There has been interval opacification of the base of the left hemithorax.  This is characteristic of a small left pleural effusion.  2. There is no acute pulmonary process visualized.  3. There is a mild amount of dextroconvex scoliosis of the thoracic spine.  .      Electronically signed by: Jonathon Payne MD  Date:    06/14/2018  Time:    11:31    and X-Ray Chest PA and Lateral (CXR): No results found for this visit on 06/14/18.    Assessment and Plan:       * Syncope    -Syncopal episode likely vasovagal in nature. Was followed by attempting to have a BM  -Also noted to have orthostatic vitals  -Needs to hydrate  - Coreg stopped to avoid orthostasis.   -Continue  Amlodipine to 10mg daily   -stress test canceled due to anemia that needs to be treated. May need to be done as an OP   -Carotid US  shows no significant stenosis  -head CT negative         Hypertensive urgency    Likely due to missing medication.   Amlodipine has been resumed            CKD (chronic kidney disease), stage IV    Recommend consulting nephrology             VTE Risk Mitigation         Ordered     heparin (porcine) injection 5,000 Units  Every 8 hours      06/14/18 1622     IP VTE HIGH RISK PATIENT  Once      06/14/18 1348        Chart reviewed. Patient examined by Dr. Maguire and agrees with plan that has been outlined.       LORENA Cruz  Cardiology  [facility] [place]

## 2018-06-19 NOTE — PROGRESS NOTES
Received a call from [facility], they are currently out of bed that we ordered - Low boy bed with pulsate mattress will be sent instead of evolution pulsate. Only difference is there is no scale on bed.

## 2018-06-19 NOTE — PT/OT/SLP PROGRESS
Physical Therapy  Treatment    Brynn Augustine   MRN: [id number]   Admitting Diagnosis: Syncope    PT Received On: 06/19/18  PT Start Time: 1221     PT Stop Time: 1248    PT Total Time (min): 27 min       Billable Minutes:  Therapeutic Activity 16 mins and Therapeutic Exercise 11 mins    Treatment Type: Treatment  PT/PTA: PT     PTA Visit Number: 3       General Precautions: Standard, fall  Orthopedic Precautions: N/A   Braces: N/A         Subjective:  Communicated with EPIC and nurse (Teresa) prior to session.  Nurse requested that patient be returned to bed after session.  Awaiting blood transfusion.  Patient supine in bed and agreeable to participating with therapy.      Pain/Comfort  Pain Rating 1: 0/10    Objective:   Patient found with: peripheral IV, telemetry    Functional Mobility:  Bed Mobility:    Rolling to left with supervision; supine <-> sit CGA; seated scooting CGA.    Transfers:   Patient transferred sit <-> stand and stand-pivot with RW with CGA and verbal cues for hand placement.    Gait:    Gait training 20' x 2 with RW with min assist.  R drop foot; requires verbal cues to increase R step length as R LE will tend to lag behind L LE during amb.  Patient unsteady; legally blind so requires some assist and verbal cues for obstacle negotiation and environment.    Balance:   Static Sit: GOOD: Takes MODERATE challenges from all directions  Dynamic Sit: FAIR+: Maintains balance through MINIMAL excursions of active trunk motion  Static Stand: POOR+: Needs MINIMAL assist to maintain  Dynamic stand: POOR+: Needs MIN (minimal ) assist during gait     Therapeutic Activities and Exercises:  Patient participated in bed mobility, transfer training with RW, and gait training with RW.  Performed seated BLE therapeutic exercises 8 x 2 in all planes with verbal/tactile cues for technique and to continue with repetitions.  Also performed seating reaching activities to challenge sitting balance.     AM-PAC 6 CLICK  MOBILITY  How much help from another person does this patient currently need?   1 = Unable, Total/Dependent Assistance  2 = A lot, Maximum/Moderate Assistance  3 = A little, Minimum/Contact Guard/Supervision  4 = None, Modified [place]/Independent    Turning over in bed (including adjusting bedclothes, sheets and blankets)?: 4  Sitting down on and standing up from a chair with arms (e.g., wheelchair, bedside commode, etc.): 3  Moving from lying on back to sitting on the side of the bed?: 3  Moving to and from a bed to a chair (including a wheelchair)?: 3  Need to walk in hospital room?: 3  Climbing 3-5 steps with a railing?: 1  Total Score: 17    AM-PAC Raw Score CMS G-Code Modifier Level of Impairment Assistance   6 % Total / Unable   7 - 9 CM 80 - 100% Maximal Assist   10 - 14 CL 60 - 80% Moderate Assist   15 - 19 CK 40 - 60% Moderate Assist   20 - 22 CJ 20 - 40% Minimal Assist   23 CI 1-20% SBA / CGA   24 CH 0% Independent/ Mod I     Patient left supine with all lines intact, call button in reach and family present.    Assessment:  Patient increased amb distance today.  Progressing toward all goals. Will benefit from continued skilled PT services.    Rehab identified problem list/impairments: Rehab identified problem list/impairments: weakness, impaired endurance, impaired self care skills, impaired functional mobilty, gait instability, impaired balance, decreased lower extremity function    Rehab potential is good.    Activity tolerance: Good    Discharge recommendations: Discharge Facility/Level Of Care Needs: nursing facility, skilled     Barriers to discharge:      Equipment recommendations: Equipment Needed After Discharge: none     GOALS:    Physical Therapy Goals        Problem: Physical Therapy Goal    Goal Priority Disciplines Outcome Goal Variances Interventions   Physical Therapy Goal     PT/OT, PT Ongoing (interventions implemented as appropriate)     Description:  PT WILL BE SEEN FOR  P.T. FOR A MIN OF 5 OUT OF 7 DAYS A WEEK  LT18  1. PT WILL COMPLETE BED MOBILITY MARICARMEN  2. PT WILL T/F TO CHAIR WITH RW AND CGA  3. PT WILL GT TRAIN X 150' WITH RW AND CGA  4. PT WILL COMPLETE B LE TE X 20 REPS                    PLAN:    Patient to be seen 5 x/week  to address the above listed problems via therapeutic activities, therapeutic exercises, gait training  Plan of Care expires: 18  Plan of Care reviewed with: patient         Kala Peng, PT, DPT  2018

## 2018-06-19 NOTE — PROGRESS NOTES
[facility] Hospital Medicine  Progress Note    Patient Name: Brynn Augustine  MRN: [id number]  Patient Class: IP- Inpatient   Admission Date: 6/14/2018  Length of Stay: 2 days  Attending Physician: Adonis Meade MD  Primary Care Provider: Gabby Carrera MD        Subjective:     Principal Problem:Syncope    HPI:  Brynn Augustine is an 87 year old female with diabetes mellitus type II, hypertension and stage IV chronic kidney disease who presents to the ED with reports of a fall at home. The patient's son reports that he found her laying in her bath tub this morning at 7 AM. He suspects she fell into the tub while attempting to use the restroom during the night. The patient reports no complaints other than feeling fatigued. She denies pain. She is at her normal baseline health and does a history of chronic vision loss. She is oriented to person and place but does not time. At the time of exam, she is unable to recall common details such as her birthday and who is president. However, her son reports that she was able to recall these details earlier in the day. He also reports that she does not have a history of memory issues or a diagnosis of dementia. She lives alone, but he checks on her daily. In the ED, the patient had an increased CPK of 419, CPK MB of 22.0, and BNP of 1,313. Code status was discussed. She is a DNR. Her three children are her surrogate medical decision makers.       Hospital Course:  Ms Augustine is a 87 year female with PMHx of HTN, CKD and DM. She was admitted to [facility] after reports of being found in her bathtub after a fall. Upon arrival, she was found to have a hypertensive urgency, mild encephalopathy, elevated CPK, hypokalemia and hypomagnesemia. The patient was observed overnight without incident. However, this morning, while on the toilet having a bowel movement, she had a syncopal episode. The patient was further evaluated with orthostatic vital signs, 2D echo and carotid  US. There were no significant findings. Her Coreg dose was decreased and her Lasix was held. Physical therapy evaluated the patient and recommended skilled nursing care. This was discussed with the patient and family. Patient and family was provided SNF listing for in [facility]. 2D echo revealed Normal EF 60-65%, DD, and pulmonary HTN. Bilateral carotid US: 20-50% stenosis within the right internal carotid artery and 50-59% stenosis within the left internal carotid artery. Awaiting on placement.   6/18/2018-While sitting up in chair today, nurse found patient difficult to aroused, eyes open, awaken to painful stimuli SBP in 80's. Patient assisted back to bed, lethargic, however back to baseline.   6/19/2018- Patient seen and examined today. Reports feeling better. Received IVF and renal function improved, creatinine 1.8 up for 2.1 on yesterday. H & H decrease 7.1/21.8 today, will transfuse one unit of PRBC today.     Interval History: Patient seen and examined today. Vital signs stable, renal function improved. She is having having anemia, will transfuse on unit of PRBC.     Review of Systems   Constitutional: Positive for fatigue. Negative for chills and fever.   HENT: Negative for congestion, rhinorrhea and sinus pressure.    Respiratory: Negative for apnea, cough, choking, chest tightness, shortness of breath, wheezing and stridor.    Cardiovascular: Negative for chest pain, palpitations and leg swelling.   Gastrointestinal: Negative for abdominal distention, abdominal pain, diarrhea, nausea and vomiting.   Endocrine: Negative for cold intolerance and heat intolerance.   Genitourinary: Negative for difficulty urinating and hematuria.   Musculoskeletal: Negative for arthralgias and joint swelling.   Skin: Negative for color change, pallor and rash.   Neurological: Positive for weakness. Negative for dizziness, seizures, numbness and headaches.   Psychiatric/Behavioral: Negative for agitation. The  patient is not nervous/anxious.      Objective:     Vital Signs (Most Recent):  Temp: 98.2 °F (36.8 °C) (06/19/18 1140)  Pulse: 78 (06/19/18 1140)  Resp: 18 (06/19/18 1140)  BP: (!) 140/65 (06/19/18 1140)  SpO2: 96 % (06/19/18 1140) Vital Signs (24h Range):  Temp:  [97.3 °F (36.3 °C)-98.6 °F (37 °C)] 98.2 °F (36.8 °C)  Pulse:  [70-82] 78  Resp:  [16-18] 18  SpO2:  [96 %-99 %] 96 %  BP: ()/(39-83) 140/65     Weight: 55.9 kg (123 lb 3.8 oz)  Body mass index is 23.29 kg/m².    Intake/Output Summary (Last 24 hours) at 06/19/18 1311  Last data filed at 06/19/18 0600   Gross per 24 hour   Intake          3308.33 ml   Output              300 ml   Net          3008.33 ml      Physical Exam   Constitutional: She is oriented to person, place, and time. No distress.   HENT:   Mouth/Throat: No oropharyngeal exudate.   Eyes: Right eye exhibits no discharge. Left eye exhibits no discharge.   Neck: No JVD present.   Cardiovascular: Exam reveals no friction rub.    No murmur heard.  Pulmonary/Chest: Effort normal. No respiratory distress. She has decreased breath sounds in the left lower field. She has no wheezes. She has no rales. She exhibits no tenderness.   Abdominal: She exhibits no distension and no mass. There is no tenderness. There is no rebound and no guarding. No hernia.   Musculoskeletal: She exhibits no edema or deformity.   Neurological: She is alert and oriented to person, place, and time.   Skin: Skin is warm and dry. Capillary refill takes less than 2 seconds. She is not diaphoretic. There is pallor.   Psychiatric: She has a normal mood and affect. Her behavior is normal. Judgment and thought content normal.   Nursing note and vitals reviewed.      Significant Labs:   CBC:   Recent Labs  Lab 06/18/18  0506 06/19/18  0409   WBC 6.22 5.22   HGB 8.3* 7.1*   HCT 25.9* 21.8*    196     CMP:   Recent Labs  Lab 06/18/18  0506 06/19/18  0409    141   K 4.4 4.1    108   CO2 28 27    101   BUN  44* 40*   CREATININE 2.1* 1.8*   CALCIUM 9.1 8.6*   PROT 6.3 5.5*   ALBUMIN 3.1* 2.9*   BILITOT 0.6 0.6   ALKPHOS 60 56   AST 19 28   ALT <5* 9*   ANIONGAP 8 6*   EGFRNONAA 21* 25*       Significant Imaging:     Imaging Results          X-Ray Chest 1 View (Final result)  Result time 06/14/18 11:31:54    Final result by ISIDORO Payne Sr., MD (06/14/18 11:31:54)                 Impression:      1. There has been interval opacification of the base of the left hemithorax.  This is characteristic of a small left pleural effusion.  2. There is no acute pulmonary process visualized.  3. There is a mild amount of dextroconvex scoliosis of the thoracic spine.  .      Electronically signed by: Jonathon Payne MD  Date:    06/14/2018  Time:    11:31             Narrative:    EXAMINATION:  XR CHEST 1 VIEW    CLINICAL HISTORY:  weakness;    COMPARISON:  12/06/2014    FINDINGS:  The size of the heart is prominent.  There is no acute pulmonary process visualized.  There are calcific densities projected over the right breast and right hemithorax.  There has been interval opacification of the base of the left hemithorax.  There is no pneumothorax.  The right costophrenic angle is sharp.  There is a mild amount of dextroconvex scoliosis of the thoracic spine.                               CT Head Without Contrast (Final result)  Result time 06/14/18 11:28:56    Final result by ISIDORO Payne Sr., MD (06/14/18 11:28:56)                 Impression:      1. There is no evidence of an acute ischemic event. There are moderate bilateral periventricular ischemic white matter changes.  2. There is no acute appearing intracranial hemorrhage.  All CT scans at this facility use dose modulation, iterative reconstruction, and/or weight base dosing when appropriate to reduce radiation dose when appropriate to reduce radiation dose to as low as reasonably achievable.      Electronically signed by: Jonathon Payne  MD  Date:    06/14/2018  Time:    11:28             Narrative:    EXAMINATION:  CT HEAD WITHOUT CONTRAST    CLINICAL HISTORY:  WeaknessDizziness;    TECHNIQUE:  Standard brain CT protocol without IV contrast was performed.    COMPARISON:  06/14/2017    FINDINGS:  There is no evidence of an acute ischemic event.  There are moderate bilateral periventricular ischemic white matter changes.  There is no acute appearing intracranial hemorrhage.  There is no calvarial fracture.  The visualized portion of the paranasal sinuses is clear.                              Assessment/Plan:      * Syncope    -Likely due to a vasovagal episode? Orthostatics   -+orthostatis   -Monitor orthostatics qshift   -Chilo Carotid US revealed 20-50% stenosis within the right internal carotid artery and 50-59% stenosis within the left internal carotid artery.    -              Anemia    Probable due to chronic renal disease   H & H 7.1/ 21.8  Transfuse one unit of PRBC  Monitor               CKD (chronic kidney disease), stage IV    -Stable   -Baseline Creatinine 1.8  -Appropriately dose medications and monitor.  -Creatinine 2.1 on yesterday, down to 1.8 on today.   -Anemia due to chronic kidney disease            Hypertensive urgency    -Improved.   -Continue Norvasc and Coreg  -Hydralazine as needed.           At risk for falls    -Bed alarm.   -Fall precautions.   -PT and OT recommends skilled nursing             Hypokalemia    Monitor and replace as needed.   Resolved         Encephalopathy    -Overall improved. However, patient likely has a baseline dementia.   -Work up for acute metabolic cause is negative.    -Outpatient Neurology follow up recommended.           DM II (diabetes mellitus, type II), controlled    -NISS.   -ADA diet.   -Hemoglobin A1C was 5.5 on 6/14/18.           Hypomagnesemia    Monitor and replace as needed.   Resolved           VTE Risk Mitigation         Ordered     heparin (porcine) injection 5,000 Units  Every 8  hours      06/14/18 1622     IP VTE HIGH RISK PATIENT  Once      06/14/18 1348              Akhil Noble NP  Department of Hospital Medicine   [facility]

## 2018-06-20 ENCOUNTER — TELEPHONE (OUTPATIENT)
Dept: INTERNAL MEDICINE | Facility: CLINIC | Age: 83
End: 2018-06-20

## 2018-06-20 VITALS
SYSTOLIC BLOOD PRESSURE: 155 MMHG | WEIGHT: 123.25 LBS | BODY MASS INDEX: 23.27 KG/M2 | TEMPERATURE: 97 F | RESPIRATION RATE: 16 BRPM | DIASTOLIC BLOOD PRESSURE: 70 MMHG | HEART RATE: 84 BPM | OXYGEN SATURATION: 98 % | HEIGHT: 61 IN

## 2018-06-20 LAB
ALBUMIN SERPL BCP-MCNC: 3.4 G/DL
ALP SERPL-CCNC: 69 U/L
ALT SERPL W/O P-5'-P-CCNC: 13 U/L
ANION GAP SERPL CALC-SCNC: 7 MMOL/L
AST SERPL-CCNC: 34 U/L
BASOPHILS # BLD AUTO: 0.01 K/UL
BASOPHILS NFR BLD: 0.1 %
BILIRUB SERPL-MCNC: 1.1 MG/DL
BUN SERPL-MCNC: 40 MG/DL
CALCIUM SERPL-MCNC: 9.4 MG/DL
CHLORIDE SERPL-SCNC: 105 MMOL/L
CO2 SERPL-SCNC: 26 MMOL/L
CREAT SERPL-MCNC: 1.8 MG/DL
DIFFERENTIAL METHOD: ABNORMAL
EOSINOPHIL # BLD AUTO: 0.5 K/UL
EOSINOPHIL NFR BLD: 6.5 %
ERYTHROCYTE [DISTWIDTH] IN BLOOD BY AUTOMATED COUNT: 19.5 %
EST. GFR  (AFRICAN AMERICAN): 29 ML/MIN/1.73 M^2
EST. GFR  (NON AFRICAN AMERICAN): 25 ML/MIN/1.73 M^2
GLUCOSE SERPL-MCNC: 127 MG/DL
HCT VFR BLD AUTO: 30 %
HGB BLD-MCNC: 9.9 G/DL
LYMPHOCYTES # BLD AUTO: 2.1 K/UL
LYMPHOCYTES NFR BLD: 29.2 %
MAGNESIUM SERPL-MCNC: 2.2 MG/DL
MCH RBC QN AUTO: 32.1 PG
MCHC RBC AUTO-ENTMCNC: 33 G/DL
MCV RBC AUTO: 97 FL
MONOCYTES # BLD AUTO: 0.6 K/UL
MONOCYTES NFR BLD: 8.9 %
NEUTROPHILS # BLD AUTO: 3.9 K/UL
NEUTROPHILS NFR BLD: 55.3 %
PHOSPHATE SERPL-MCNC: 3.2 MG/DL
PLATELET # BLD AUTO: 235 K/UL
PMV BLD AUTO: 10.4 FL
POCT GLUCOSE: 118 MG/DL (ref 70–110)
POCT GLUCOSE: 217 MG/DL (ref 70–110)
POTASSIUM SERPL-SCNC: 4.4 MMOL/L
PROT SERPL-MCNC: 6.8 G/DL
RBC # BLD AUTO: 3.08 M/UL
SODIUM SERPL-SCNC: 138 MMOL/L
WBC # BLD AUTO: 7.09 K/UL

## 2018-06-20 PROCEDURE — 36415 COLL VENOUS BLD VENIPUNCTURE: CPT

## 2018-06-20 PROCEDURE — 25000003 PHARM REV CODE 250: Performed by: FAMILY MEDICINE

## 2018-06-20 PROCEDURE — 25000003 PHARM REV CODE 250: Performed by: NURSE PRACTITIONER

## 2018-06-20 PROCEDURE — 97110 THERAPEUTIC EXERCISES: CPT

## 2018-06-20 PROCEDURE — 83735 ASSAY OF MAGNESIUM: CPT

## 2018-06-20 PROCEDURE — 80053 COMPREHEN METABOLIC PANEL: CPT

## 2018-06-20 PROCEDURE — 63600175 PHARM REV CODE 636 W HCPCS: Performed by: PHYSICIAN ASSISTANT

## 2018-06-20 PROCEDURE — 84100 ASSAY OF PHOSPHORUS: CPT

## 2018-06-20 PROCEDURE — 25000003 PHARM REV CODE 250: Performed by: PHYSICIAN ASSISTANT

## 2018-06-20 PROCEDURE — 85025 COMPLETE CBC W/AUTO DIFF WBC: CPT

## 2018-06-20 PROCEDURE — 97116 GAIT TRAINING THERAPY: CPT

## 2018-06-20 RX ORDER — AMLODIPINE BESYLATE 10 MG/1
10 TABLET ORAL DAILY
Qty: 30 TABLET | Refills: 0
Start: 2018-06-21 | End: 2019-06-21

## 2018-06-20 RX ADMIN — HEPARIN SODIUM 5000 UNITS: 5000 INJECTION, SOLUTION INTRAVENOUS; SUBCUTANEOUS at 06:06

## 2018-06-20 RX ADMIN — MAGNESIUM OXIDE TAB 400 MG (241.3 MG ELEMENTAL MG) 400 MG: 400 (241.3 MG) TAB at 10:06

## 2018-06-20 RX ADMIN — INSULIN ASPART 2 UNITS: 100 INJECTION, SOLUTION INTRAVENOUS; SUBCUTANEOUS at 11:06

## 2018-06-20 RX ADMIN — PANTOPRAZOLE SODIUM 40 MG: 40 TABLET, DELAYED RELEASE ORAL at 10:06

## 2018-06-20 RX ADMIN — ALLOPURINOL 200 MG: 100 TABLET ORAL at 10:06

## 2018-06-20 RX ADMIN — AMLODIPINE BESYLATE 10 MG: 10 TABLET ORAL at 10:06

## 2018-06-20 NOTE — TELEPHONE ENCOUNTER
----- Message from Zenaida Mcneil sent at 6/20/2018  1:55 PM CDT -----  Contact: Octavia Fiore/[facility]  Call Octavia at [phone]   They are needing to know the date of last pneumonia vaccine and flu vaccine

## 2018-06-20 NOTE — NURSING
Report called to [facility]. Report given to Neela CARRENO. Transport on the way. Telemonitor returned to monitor tech.

## 2018-06-20 NOTE — NURSING
Family at bedside states patient c/o lightheadedness while sitting up in chair. Patient back to bed orthostatic vs taken and negative, pt denies any lightheadedness or dizziness with change in positions or standing or walking.

## 2018-06-20 NOTE — PLAN OF CARE
Jun25 Established Patient Visit with Gabby Carrera MD   Monday Jun 25, 2018 11:20 AM  [facility] - Internal Medicine   [address] [facility] [address]   [phone]    Jun27 Hospital Follow Up with LORENA Cruz   Wednesday Jun 27, 2018 11:00 AM  [facility] - Cardiology   [address] [place] [id number]   [phone]      CM sent discharge orders to [facility].  Number for report provided to Yudi, primary nurse.  CM awaiting transportation time.      @1148 MAINOR spoke to [facility] with [facility].  Transportation is in route to pick patient up.  MAINOR updated Yudi with  status     06/20/18 1120   Final Note   Assessment Type Final Discharge Note   Discharge Disposition SNF   Hospital Follow Up  Appt(s) scheduled? Yes   Right Care Referral Info   Post Acute Recommendation SNF / Sub-Acute Rehab   Facility Name [facility]

## 2018-06-20 NOTE — DISCHARGE SUMMARY
[facility]  Hospital Medicine  Discharge Summary      Patient Name: Brynn Augustine  MRN: [id number]  Admission Date: 6/14/2018  Hospital Length of Stay: 3 days  Discharge Date and Time: 6/20/2018 12:30 PM  Attending Physician: No att. providers found   Discharging Provider: Akhil Noble NP  Primary Care Provider: Gabby Carrera MD      HPI:   Brynn Augustine is an 87 year old female with diabetes mellitus type II, hypertension and stage IV chronic kidney disease who presents to the ED with reports of a fall at home. The patient's son reports that he found her laying in her bath tub this morning at 7 AM. He suspects she fell into the tub while attempting to use the restroom during the night. The patient reports no complaints other than feeling fatigued. She denies pain. She is at her normal baseline health and does a history of chronic vision loss. She is oriented to person and place but does not time. At the time of exam, she is unable to recall common details such as her birthday and who is president. However, her son reports that she was able to recall these details earlier in the day. He also reports that she does not have a history of memory issues or a diagnosis of dementia. She lives alone, but he checks on her daily. In the ED, the patient had an increased CPK of 419, CPK MB of 22.0, and BNP of 1,313. Code status was discussed. She is a DNR. Her three children are her surrogate medical decision makers.       * No surgery found *      Hospital Course:   Ms Augustine is a 87 year female with PMHx of HTN, CKD and DM. She was admitted to [facility] after reports of being found in her bathtub after a fall. Upon arrival, she was found to have a hypertensive urgency, mild encephalopathy, elevated CPK, hypokalemia and hypomagnesemia. The patient was observed overnight without incident. However, this morning, while on the toilet having a bowel movement, she had a syncopal episode. The patient was further  evaluated with orthostatic vital signs, 2D echo and carotid US. There were no significant findings. Her Coreg dose was decreased and her Lasix was held. Physical therapy evaluated the patient and recommended skilled nursing care. This was discussed with the patient and family. Patient and family was provided SNF listing for in [facility]. 2D echo revealed Normal EF 60-65%, DD, and pulmonary HTN. Bilateral carotid US: 20-50% stenosis within the right internal carotid artery and 50-59% stenosis within the left internal carotid artery. Awaiting on placement. While sitting up in chair today, nurse found patient difficult to aroused, eyes open, awaken to painful stimuli SBP in 80's. Patient assisted back to bed, lethargic, however back to baseline. Continued hydration with IVF, AM lab on yesterday showed  H & H decrease 7.1/21.8. She received one units of PRBC and H &H this AM 9.9/30.0  Renal function improved as well Creatinine 1.8. Cardiology was consulted for syncope, recommend stopping Coreg, increase anlodipine and can follow up with cardiology for stress Test as outpatient. Patient sit up in chair this AM, worked with PT and tolerated well. She was seen, examined and deem suitable for discharge to SNF.      Consults:   Consults         Status Ordering Provider     Inpatient consult to Cardiology  Once     Provider:  LORENA Cruz    Completed LUCITA MCBRIDE     Inpatient consult to Social Work  Once     Provider:  (Not yet assigned)    MARKO Abarca          No new Assessment & Plan notes have been filed under this hospital service since the last note was generated.  Service: Hospital Medicine    Final Active Diagnoses:    Diagnosis Date Noted POA    PRINCIPAL PROBLEM:  Syncope [R55] 06/15/2018 No    Anemia [D64.9] 11/13/2013 Yes    CKD (chronic kidney disease), stage IV [N18.4]  Yes     Chronic    Hypertensive urgency [I16.0]  Yes    At risk for falls [Z91.81]  12/13/2017 Not Applicable    Hypokalemia [E87.6] 06/14/2018 Yes    Dermatitis associated with moisture [L30.8] 06/18/2018 Yes    Hypomagnesemia [E83.42] 06/14/2018 Yes    DM II (diabetes mellitus, type II), controlled [E11.9] 06/14/2018 Yes    Encephalopathy [G93.40] 06/14/2018 Yes      Problems Resolved During this Admission:    Diagnosis Date Noted Date Resolved POA    Elevated CPK [R74.8] 06/14/2018 06/15/2018 Yes       Discharged Condition: stable    Disposition: Skilled Nursing Facility    Follow Up:  Follow-up Information     Schedule an appointment as soon as possible for a visit with Gabby Carrera MD.    Specialty:  Family Medicine  Why:  hospital follow up   Contact information:  [address] [place] [address] [phone]             [facility]. Schedule an appointment as soon as possible for a visit in 1 week.    Specialty:  Cardiology  Why:  hospital follow up, possible stress test   Contact information:  [address]  [place] [address] [phone]             [facility].    Specialties:  Nursing Home Agency, SNF Agency  Contact information:  [address] [facility] [place] [address] [phone]                 Patient Instructions:     Activity as tolerated         Significant Diagnostic Studies: Labs:   CMP   Recent Labs  Lab 06/19/18  0409 06/20/18  0516    138   K 4.1 4.4    105   CO2 27 26    127*   BUN 40* 40*   CREATININE 1.8* 1.8*   CALCIUM 8.6* 9.4   PROT 5.5* 6.8   ALBUMIN 2.9* 3.4*   BILITOT 0.6 1.1*   ALKPHOS 56 69   AST 28 34   ALT 9* 13   ANIONGAP 6* 7*   ESTGFRAFRICA 29* 29*   EGFRNONAA 25* 25*    and CBC   Recent Labs  Lab 06/19/18  0409 06/20/18  0516   WBC 5.22 7.09   HGB 7.1* 9.9*   HCT 21.8* 30.0*    235       Pending Diagnostic Studies:     None         Medications:  Reconciled Home Medications:      Medication List      CHANGE how you take these medications    amLODIPine 10 MG  tablet  Commonly known as:  NORVASC  Take 1 tablet (10 mg total) by mouth once daily.  Start taking on:  6/21/2018  What changed:  · medication strength  · how much to take        CONTINUE taking these medications    allopurinol 100 MG tablet  Commonly known as:  ZYLOPRIM  TAKE 2 TABLETS BY MOUTH ONCE DAILY     * blood sugar diagnostic Strp  Commonly known as:  ONETOUCH ULTRA TEST  TEST BLOOD SUGAR EVERY DAY AS DIRECTED     * blood sugar diagnostic Strp  1 strip by Misc.(Non-Drug; Combo Route) route 3 (three) times daily.     blood-glucose meter kit  Use as instructed     * ergocalciferol 50,000 unit Cap  Commonly known as:  ERGOCALCIFEROL  Take 1 capsule by mouth every 7 days.     * ergocalciferol 50,000 unit Cap  Commonly known as:  ERGOCALCIFEROL  TAKE 1 CAPSULE BY MOUTH EVERY 7 DAYS     furosemide 40 MG tablet  Commonly known as:  LASIX  Take 40 mg by mouth as needed.     lancets Misc  1 Stick by Misc.(Non-Drug; Combo Route) route 3 (three) times daily.     lancing device with lancets Kit  1 each by Misc.(Non-Drug; Combo Route) route 3 (three) times daily.        * This list has 4 medication(s) that are the same as other medications prescribed for you. Read the directions carefully, and ask your doctor or other care provider to review them with you.            STOP taking these medications    carvedilol 12.5 MG tablet  Commonly known as:  COREG            Indwelling Lines/Drains at time of discharge:   Lines/Drains/Airways          No matching active lines, drains, or airways          Time spent on the discharge of patient: 45 minutes  Patient was seen and examined on the date of discharge and determined to be suitable for discharge.         Akhil Noble NP  Department of Hospital Medicine  [facility]

## 2018-06-20 NOTE — PLAN OF CARE
Problem: Patient Care Overview  Goal: Plan of Care Review  Outcome: Ongoing (interventions implemented as appropriate)  POC reviewed with patient, verbalized understanding. Pt remains free from falls. Fall precautions in place. Pt using the bed pan and calling when she needs to go to the bathroom  NS on cardiac monitor. VSS. Pt has been a little more confused tonight and having some trouble sleeping. Pt was oriented and appropriate this am. BG monitored no insulin needed. No other complaints at this time. Q2 H turn schedule , wedge in use and speciality bed in use. Pt dsg CDI.  Call bell w/in reach. Reminded to call for assistance. Pt son at the bedside.

## 2018-06-20 NOTE — TELEPHONE ENCOUNTER
Message left requesting the patient return our call.     Pneumonia vaccine not recorded in our system.     Last recorded flu vaccine was given: 10/14/2015

## 2018-06-20 NOTE — PLAN OF CARE
06/20/18 0931   Medicare Message   Important Message from Medicare regarding Discharge Appeal Rights Given to patient/caregiver;Explained to patient/caregiver;Signed/date by patient/caregiver   Date IMM was signed 06/20/18   Time IMM was signed [id number]

## 2018-06-20 NOTE — PLAN OF CARE
Problem: Physical Therapy Goal  Goal: Physical Therapy Goal  PT WILL BE SEEN FOR P.T. FOR A MIN OF 5 OUT OF 7 DAYS A WEEK  LT18  1. PT WILL COMPLETE BED MOBILITY MARICARMEN  2. PT WILL T/F TO CHAIR WITH RW AND CGA  3. PT WILL GT TRAIN X 150' WITH RW AND CGA  4. PT WILL COMPLETE B LE TE X 20 REPS   Outcome: Ongoing (interventions implemented as appropriate)  BED MOB SUP<>SIT WITH MARYLOU. TF SIT<>STAND WITH MARYLOU. GT 60' X2 WITH RW AND MARYLOU. BLE THEREX X20 REPS.

## 2018-06-20 NOTE — PT/OT/SLP PROGRESS
Physical Therapy  Treatment    Brynn Augustine   MRN: [id number]   Admitting Diagnosis: Syncope    PT Received On: 06/20/18  PT Start Time: 0730     PT Stop Time: 0800    PT Total Time (min): 30 min       Billable Minutes:  Gait Training 15 and Therapeutic Exercise 15    Treatment Type: Treatment  PT/PTA: PT         General Precautions: Standard, fall  Orthopedic Precautions: N/A   Braces: N/A    Subjective:  Communicated with NURSE TASHANTA AND EPIC CHART REVIEW prior to session. PT WAS SUP IN BED UPON ENTRY.     Pain/Comfort  Pain Rating 1: 0/10    Objective:   Patient found with: telemetry, peripheral IV    Therapeutic Activities and Exercises:  BED MOB SUP<>SIT WITH MARYLOU. TF SIT<>STAND WITH MARYLOU. GT 60' X2 WITH RW AND MARYLOU FOR STABILITY AND PROPER USE OF RW. RIGHT LEG LAG WITH GAIT WITH VERBAL CUES TO INCREASE STEP LENGTH ON THE RIGHT. BLE THEREX X20 REPS WITH SEVERAL CUES TO STAY ON TASK. PT WAS CONFUSED AND SLOW TO RESPOND THROUGHOUT TREATMENT. PT WASHED FACE AND COMBED HAIR WITH SET UP.     AM-PAC 6 CLICK MOBILITY  How much help from another person does this patient currently need?   1 = Unable, Total/Dependent Assistance  2 = A lot, Maximum/Moderate Assistance  3 = A little, Minimum/Contact Guard/Supervision  4 = None, Modified [place]/Independent    Turning over in bed (including adjusting bedclothes, sheets and blankets)?: 4  Sitting down on and standing up from a chair with arms (e.g., wheelchair, bedside commode, etc.): 3  Moving from lying on back to sitting on the side of the bed?: 3  Moving to and from a bed to a chair (including a wheelchair)?: 3  Need to walk in hospital room?: 3  Climbing 3-5 steps with a railing?: 1  Total Score: 17    AM-PAC Raw Score CMS G-Code Modifier Level of Impairment Assistance   6 % Total / Unable   7 - 9 CM 80 - 100% Maximal Assist   10 - 14 CL 60 - 80% Moderate Assist   15 - 19 CK 40 - 60% Moderate Assist   20 - 22 CJ 20 - 40% Minimal Assist   23 CI 1-20% SBA /  CGA   24 CH 0% Independent/ Mod I     Patient left up in chair with all lines intact, call button in reach, NURSE  notified and FAMILY present.    Assessment:  Brynn Augustine is a 87 y.o. female with a medical diagnosis of Syncope and presents with IMPAIRED FUNCTIONAL MOBILITY. PT WILL BENEFIT FROM CONT SKILLED P.T. SERVICES TO ADDRESS LIMITATIONS.    Rehab identified problem list/impairments: Rehab identified problem list/impairments: weakness, impaired self care skills, impaired balance, decreased safety awareness, impaired endurance, impaired functional mobilty, gait instability    Rehab potential is GOOD.    Activity tolerance: Fair    Discharge recommendations: Discharge Facility/Level Of Care Needs: nursing facility, skilled     Barriers to discharge:      Equipment recommendations: Equipment Needed After Discharge: none     GOALS:    Physical Therapy Goals        Problem: Physical Therapy Goal    Goal Priority Disciplines Outcome Goal Variances Interventions   Physical Therapy Goal     PT/OT, PT Ongoing (interventions implemented as appropriate)     Description:  PT WILL BE SEEN FOR P.T. FOR A MIN OF 5 OUT OF 7 DAYS A WEEK  LT18  1. PT WILL COMPLETE BED MOBILITY MARICARMEN  2. PT WILL T/F TO CHAIR WITH RW AND CGA  3. PT WILL GT TRAIN X 150' WITH RW AND CGA  4. PT WILL COMPLETE B LE TE X 20 REPS                    PLAN:    Patient to be seen 5 x/week  to address the above listed problems via gait training, therapeutic activities, therapeutic exercises  Plan of Care expires: 18  Plan of Care reviewed with: patient, family    PT WAS ADVISED TO CALL FOR ASSISTANCE WITH ALL NEEDS. PT WAS AGREEABLE.     Best Lee, SPT  2018

## 2018-06-21 ENCOUNTER — PATIENT OUTREACH (OUTPATIENT)
Dept: ADMINISTRATIVE | Facility: CLINIC | Age: 83
End: 2018-06-21

## 2018-06-21 NOTE — TELEPHONE ENCOUNTER
Spoke to Octavia, relaying the vaccine information as stated in Links. Octavia verbalized understanding.

## 2018-06-21 NOTE — PHYSICIAN QUERY
PT Name: Brynn Augustine  MR #: [id number]     Physician Query Form - Documentation Clarification      CDS/: Latricia Lee    RN CCDS           Contact information:[email or web address]    This form is a permanent document in the medical record.     Query Date: June 21, 2018    By submitting this query, we are merely seeking further clarification of documentation. Please utilize your independent clinical judgment when addressing the question(s) below.    The Medical record reflects the following:    Supporting Clinical Findings Location in Medical Record     Syncope         -Likely due to a vasovagal episode? Orthostatics   -+orthostatis   -Monitor orthostatics qshift   -Chilo Carotid US revealed 20-50% stenosis within the right internal carotid artery and 50-59% stenosis within the left internal carotid artery.              PN 6/19                                 Please further study what do you believe was the final cause of the syncopal episode.  Thank you.    Provider Use Only        The patient's syncopal episode occurred while straining to have a bowel  movement. She had syncope due to a vasovagal episode.                                                                                                                          [  ] Clinically undetermined

## 2018-06-21 NOTE — PHYSICIAN QUERY
PT Name: Brynn Augustine  MR #: [id number]     Physician Query Form - Documentation Clarification      CDS/: Latricia Lee  RN CCDS             Contact information:[email or web address]    This form is a permanent document in the medical record.     Query Date: June 21, 2018    By submitting this query, we are merely seeking further clarification of documentation. Please utilize your independent clinical judgment when addressing the question(s) below.    The Medical record reflects the following:    Supporting Clinical Findings Location in Medical Record     Encephalopathy         -Overall improved. However, patient likely has a baseline dementia.   -Work up for acute metabolic cause is negative.    -Outpatient Neurology follow up recommended.             PN 6/19                                                                                      Please further clarify the encephalopathy.  Thank you.    Provider Use Only        [  X  ] Encephalopathy ruled out    [    ] Encephalopathy ruled in Type_______________    [    ] Other_________________                                                                                                                   [  ] Clinically undetermined

## 2018-08-15 ENCOUNTER — PES CALL (OUTPATIENT)
Dept: ADMINISTRATIVE | Facility: CLINIC | Age: 83
End: 2018-08-15

## 2018-08-21 ENCOUNTER — TELEPHONE (OUTPATIENT)
Dept: NEPHROLOGY | Facility: CLINIC | Age: 83
End: 2018-08-21

## 2018-08-21 NOTE — TELEPHONE ENCOUNTER
Returned call to Cris Smith states that she is not there. I left a message for her to give the office a call back.

## 2018-08-21 NOTE — TELEPHONE ENCOUNTER
----- Message from Lindsay Thompson sent at 8/21/2018  3:45 PM CDT -----  Contact: Richi Norris (Sarah)  Calling in regards to iron infusion and medical release of information and please advise [phone] and fax # [phone]

## 2018-08-24 ENCOUNTER — TELEPHONE (OUTPATIENT)
Dept: NEPHROLOGY | Facility: CLINIC | Age: 83
End: 2018-08-24

## 2018-08-24 NOTE — TELEPHONE ENCOUNTER
----- Message from Symone Seth sent at 8/24/2018  1:19 PM CDT -----  Contact: Fatuma- daughter in law    Pt is in a home, [place] is requesting orders to be faxed to [phone] for the blood work she has to have done. If there are any questions please call Fatuma back at [phone].    Thanks,   Symone Seth

## 2018-08-25 DIAGNOSIS — I10 ESSENTIAL HYPERTENSION: ICD-10-CM

## 2018-08-25 RX ORDER — CARVEDILOL 12.5 MG/1
TABLET ORAL
Qty: 180 TABLET | Refills: 0 | Status: SHIPPED | OUTPATIENT
Start: 2018-08-25 | End: 2019-01-29

## 2018-09-04 ENCOUNTER — TELEPHONE (OUTPATIENT)
Dept: NEUROLOGY | Facility: CLINIC | Age: 83
End: 2018-09-04

## 2018-09-04 ENCOUNTER — LAB VISIT (OUTPATIENT)
Dept: LAB | Facility: HOSPITAL | Age: 83
End: 2018-09-04
Attending: INTERNAL MEDICINE
Payer: MEDICARE

## 2018-09-04 DIAGNOSIS — I73.9 PERIPHERAL VASCULAR DISEASE: ICD-10-CM

## 2018-09-04 DIAGNOSIS — I10 ESSENTIAL HYPERTENSION: ICD-10-CM

## 2018-09-04 DIAGNOSIS — D63.1 ANEMIA ASSOCIATED WITH CHRONIC RENAL FAILURE: ICD-10-CM

## 2018-09-04 DIAGNOSIS — E11.22 DIABETES MELLITUS WITH STAGE 4 CHRONIC KIDNEY DISEASE: ICD-10-CM

## 2018-09-04 DIAGNOSIS — N18.4 CKD (CHRONIC KIDNEY DISEASE), STAGE IV: ICD-10-CM

## 2018-09-04 DIAGNOSIS — N18.9 ANEMIA ASSOCIATED WITH CHRONIC RENAL FAILURE: ICD-10-CM

## 2018-09-04 DIAGNOSIS — M10.30 GOUT DUE TO RENAL IMPAIRMENT, UNSPECIFIED CHRONICITY, UNSPECIFIED SITE: ICD-10-CM

## 2018-09-04 DIAGNOSIS — N18.4 DIABETES MELLITUS WITH STAGE 4 CHRONIC KIDNEY DISEASE: ICD-10-CM

## 2018-09-04 LAB
ALBUMIN SERPL BCP-MCNC: 3.5 G/DL
ANION GAP SERPL CALC-SCNC: 9 MMOL/L
BASOPHILS # BLD AUTO: 0.07 K/UL
BASOPHILS NFR BLD: 0.7 %
BUN SERPL-MCNC: 49 MG/DL
CALCIUM SERPL-MCNC: 9.6 MG/DL
CHLORIDE SERPL-SCNC: 109 MMOL/L
CO2 SERPL-SCNC: 23 MMOL/L
CREAT SERPL-MCNC: 1.8 MG/DL
DIFFERENTIAL METHOD: ABNORMAL
EOSINOPHIL # BLD AUTO: 0.3 K/UL
EOSINOPHIL NFR BLD: 3 %
ERYTHROCYTE [DISTWIDTH] IN BLOOD BY AUTOMATED COUNT: 19.5 %
EST. GFR  (AFRICAN AMERICAN): 28.6 ML/MIN/1.73 M^2
EST. GFR  (NON AFRICAN AMERICAN): 24.8 ML/MIN/1.73 M^2
GLUCOSE SERPL-MCNC: 195 MG/DL
HCT VFR BLD AUTO: 24.9 %
HGB BLD-MCNC: 7.7 G/DL
IMM GRANULOCYTES # BLD AUTO: 0.05 K/UL
IMM GRANULOCYTES NFR BLD AUTO: 0.5 %
LYMPHOCYTES # BLD AUTO: 3 K/UL
LYMPHOCYTES NFR BLD: 29.8 %
MCH RBC QN AUTO: 32.6 PG
MCHC RBC AUTO-ENTMCNC: 30.9 G/DL
MCV RBC AUTO: 106 FL
MONOCYTES # BLD AUTO: 0.7 K/UL
MONOCYTES NFR BLD: 7 %
NEUTROPHILS # BLD AUTO: 5.9 K/UL
NEUTROPHILS NFR BLD: 59 %
NRBC BLD-RTO: 1 /100 WBC
PHOSPHATE SERPL-MCNC: 4.3 MG/DL
PLATELET # BLD AUTO: 307 K/UL
PMV BLD AUTO: 11.7 FL
POTASSIUM SERPL-SCNC: 5.2 MMOL/L
PTH-INTACT SERPL-MCNC: 118 PG/ML
RBC # BLD AUTO: 2.36 M/UL
SODIUM SERPL-SCNC: 141 MMOL/L
WBC # BLD AUTO: 9.99 K/UL

## 2018-09-04 PROCEDURE — 80069 RENAL FUNCTION PANEL: CPT

## 2018-09-04 PROCEDURE — 83970 ASSAY OF PARATHORMONE: CPT

## 2018-09-04 PROCEDURE — 85025 COMPLETE CBC W/AUTO DIFF WBC: CPT

## 2018-09-04 PROCEDURE — 36415 COLL VENOUS BLD VENIPUNCTURE: CPT | Mod: PO

## 2018-09-06 ENCOUNTER — PES CALL (OUTPATIENT)
Dept: ADMINISTRATIVE | Facility: CLINIC | Age: 83
End: 2018-09-06

## 2018-09-07 ENCOUNTER — HOSPITAL ENCOUNTER (EMERGENCY)
Facility: HOSPITAL | Age: 83
Discharge: HOME | End: 2018-09-08
Attending: EMERGENCY MEDICINE
Payer: MEDICARE

## 2018-09-07 ENCOUNTER — TELEPHONE (OUTPATIENT)
Dept: NEPHROLOGY | Facility: CLINIC | Age: 83
End: 2018-09-07

## 2018-09-07 VITALS
TEMPERATURE: 99 F | OXYGEN SATURATION: 100 % | HEART RATE: 99 BPM | RESPIRATION RATE: 16 BRPM | DIASTOLIC BLOOD PRESSURE: 65 MMHG | BODY MASS INDEX: 24.07 KG/M2 | SYSTOLIC BLOOD PRESSURE: 140 MMHG | HEIGHT: 60 IN

## 2018-09-07 DIAGNOSIS — D63.1 ANEMIA ASSOCIATED WITH STAGE 4 CHRONIC RENAL FAILURE: Primary | ICD-10-CM

## 2018-09-07 DIAGNOSIS — N18.4 ANEMIA ASSOCIATED WITH STAGE 4 CHRONIC RENAL FAILURE: Primary | ICD-10-CM

## 2018-09-07 DIAGNOSIS — Z86.2 HISTORY OF IRON DEFICIENCY ANEMIA: ICD-10-CM

## 2018-09-07 LAB
ABO + RH BLD: NORMAL
ALBUMIN SERPL BCP-MCNC: 3.6 G/DL
ALP SERPL-CCNC: 98 U/L
ALT SERPL W/O P-5'-P-CCNC: 11 U/L
ANION GAP SERPL CALC-SCNC: 9 MMOL/L
AST SERPL-CCNC: 21 U/L
BASOPHILS # BLD AUTO: 0.04 K/UL
BASOPHILS NFR BLD: 0.4 %
BILIRUB SERPL-MCNC: 0.5 MG/DL
BLD GP AB SCN CELLS X3 SERPL QL: NORMAL
BLD PROD TYP BPU: NORMAL
BLOOD GROUP ANTIBODIES SERPL: NORMAL
BLOOD UNIT EXPIRATION DATE: NORMAL
BLOOD UNIT TYPE CODE: 9500
BLOOD UNIT TYPE: NORMAL
BUN SERPL-MCNC: 51 MG/DL
CALCIUM SERPL-MCNC: 9 MG/DL
CHLORIDE SERPL-SCNC: 109 MMOL/L
CO2 SERPL-SCNC: 25 MMOL/L
CODING SYSTEM: NORMAL
CREAT SERPL-MCNC: 1.9 MG/DL
DIFFERENTIAL METHOD: ABNORMAL
DISPENSE STATUS: NORMAL
EOSINOPHIL # BLD AUTO: 0.3 K/UL
EOSINOPHIL NFR BLD: 3.2 %
ERYTHROCYTE [DISTWIDTH] IN BLOOD BY AUTOMATED COUNT: 19.7 %
EST. GFR  (AFRICAN AMERICAN): 27 ML/MIN/1.73 M^2
EST. GFR  (NON AFRICAN AMERICAN): 23 ML/MIN/1.73 M^2
GLUCOSE SERPL-MCNC: 155 MG/DL
HCT VFR BLD AUTO: 23.2 %
HGB BLD-MCNC: 7.5 G/DL
LYMPHOCYTES # BLD AUTO: 3.6 K/UL
LYMPHOCYTES NFR BLD: 36.3 %
MCH RBC QN AUTO: 32.6 PG
MCHC RBC AUTO-ENTMCNC: 32.3 G/DL
MCV RBC AUTO: 101 FL
MONOCYTES # BLD AUTO: 1 K/UL
MONOCYTES NFR BLD: 9.6 %
NEUTROPHILS # BLD AUTO: 5 K/UL
NEUTROPHILS NFR BLD: 50.5 %
NUM UNITS TRANS PACKED RBC: NORMAL
PLATELET # BLD AUTO: 390 K/UL
PMV BLD AUTO: 9.6 FL
POTASSIUM SERPL-SCNC: 4.9 MMOL/L
PROT SERPL-MCNC: 7.3 G/DL
RBC # BLD AUTO: 2.3 M/UL
SODIUM SERPL-SCNC: 143 MMOL/L
WBC # BLD AUTO: 9.96 K/UL

## 2018-09-07 PROCEDURE — 86922 COMPATIBILITY TEST ANTIGLOB: CPT

## 2018-09-07 PROCEDURE — 36430 TRANSFUSION BLD/BLD COMPNT: CPT | Mod: 59

## 2018-09-07 PROCEDURE — 86850 RBC ANTIBODY SCREEN: CPT

## 2018-09-07 PROCEDURE — P9016 RBC LEUKOCYTES REDUCED: HCPCS

## 2018-09-07 PROCEDURE — 86870 RBC ANTIBODY IDENTIFICATION: CPT

## 2018-09-07 PROCEDURE — 86077 PHYS BLOOD BANK SERV XMATCH: CPT | Mod: S$GLB,,, | Performed by: PATHOLOGY

## 2018-09-07 PROCEDURE — 85025 COMPLETE CBC W/AUTO DIFF WBC: CPT

## 2018-09-07 PROCEDURE — 80053 COMPREHEN METABOLIC PANEL: CPT

## 2018-09-07 PROCEDURE — 99284 EMERGENCY DEPT VISIT MOD MDM: CPT | Mod: 25

## 2018-09-07 RX ORDER — HYDROCODONE BITARTRATE AND ACETAMINOPHEN 500; 5 MG/1; MG/1
TABLET ORAL
Status: DISCONTINUED | OUTPATIENT
Start: 2018-09-07 | End: 2018-09-08 | Stop reason: HOSPADM

## 2018-09-07 NOTE — TELEPHONE ENCOUNTER
----- Message from Vinod Pro MD sent at 9/7/2018  3:06 PM CDT -----  Contact: Doug (pt's daughter)   Dr Oquendo patient    ----- Message -----  From: Emily Hmamer LPN  Sent: 9/7/2018   9:21 AM  To: Vinod Pro MD    Pt daughter would like lab results.  VB  ----- Message -----  From: Janel Lux  Sent: 9/7/2018   9:03 AM  To: Azra PARADA Staff    Fatuma called and stated she needed to know if th ept's urine and lab results are in. She can be reached at [phone].    Thanks,  TF

## 2018-09-07 NOTE — TELEPHONE ENCOUNTER
Patient is on her way to the [facility] ER for low hemoglobin. Patient's daughter wants to notified her Nephrologist.

## 2018-09-07 NOTE — ED PROVIDER NOTES
"SCRIBE #1 NOTE: I, Corinne Mack, am scribing for, and in the presence of, Tato Tapia MD. I have scribed the HPI, ROS, and PEx.     SCRIBE #2 NOTE: I, Freda Cardenas, am scribing for, and in the presence of,  Mary Sal MD. I have scribed the remaining portions of the note not scribed by Scribe #1.     History      Chief Complaint   Patient presents with    Abnormal lab     From [facility]. Low H&H. NP sent pt over here for further eval. Hx of renal failure. Pt denies CP/SOB.        Review of patient's allergies indicates:   Allergen Reactions    Ace inhibitors      Caused hyperkalemia    Arb-angiotensin receptor antagonist Other (See Comments)     hyperkalemia    Codeine Nausea Only    Morphine Nausea Only        HPI   HPI    9/7/2018, 3:37 PM   History obtained from the patient      History of Present Illness: Brynn Augustine is a 88 y.o. female patient with PMHx of DM, anemia, HTN, and CKD who was sent to the Emergency Department from [facility] for further evaluation of pt abnormal labs. Pt had blood drawn at [facility] this morning and was found to have a "very low" H/H. Pt's family was advised by the NH to present to the ED for further evaluation and a blood transfusion. Pt's nephrologist is Dr. Oquendo. Pt's family called him to let him know they were presenting to the ED. Pt c/o bilateral hand tremors which onset gradually a few weeks ago. Symptoms are episodic and moderate in severity. No mitigating or exacerbating factors reported. Pt states that she otherwise has no complaints. No associated sxs reported. Patient denies any fever, chills, diaphoresis, CP, SOB, abd pain, N/V, HA, dizziness, and all other sxs at this time. No prior Tx reported. No further complaints or concerns at this time.       Arrival mode: Personal vehicle    PCP: Gabby Carrera MD       Past Medical History:  Past Medical History:   Diagnosis Date    Anemia     Arthritis     Back pain     Breast " cancer 2008    right    Diabetes with neurologic complications     Glaucoma     Gout     Hyperlipidemia     Hypertension     Hypoglycemia 12/6/2014    Kidney disease, chronic, stage IV (GFR 15-29 ml/min)     Renal manifestation of secondary diabetes mellitus     Retinitis pigmentosa 05/04/2016    outside eye exam (No BDR)    Skin cancer     several       Past Surgical History:  Past Surgical History:   Procedure Laterality Date    APPENDECTOMY  1964    incidental with choly    BREAST SURGERY Right 08/13/2008    excisional Bx =infiltrating ductal ca, low grade ;calcifications identified within benign and malignant ducts.    BREAST SURGERY Right 09/09/2008    sentinal node and lymoh node dissection    CHOLECYSTECTOMY  1964    EYE SURGERY Bilateral 2006    IOL ou ( 2006) and YAGOD    HYSTERECTOMY  1971    Moh's  procedure Left 04/25/2011    BCC, left chin; BSC on Lt upper lip, BCC lt side of nose (dR FELIZ)    SPINE SURGERY  1975    Lumbosacral disc         Family History:  Family History   Problem Relation Age of Onset    Diabetes Unknown     Cancer Mother         lung    Cancer Father         lung    Heart disease Brother         MI    Heart disease Brother         MI    Stroke Brother     Diabetes Sister     Mental illness Neg Hx     Mental retardation Neg Hx     Hypertension Neg Hx     Drug abuse Neg Hx     Alcohol abuse Neg Hx     COPD Neg Hx     Asthma Neg Hx        Social History:  Social History     Tobacco Use    Smoking status: Never Smoker    Smokeless tobacco: Never Used   Substance and Sexual Activity    Alcohol use: No    Drug use: No    Sexual activity: No     Partners: Male       ROS   Review of Systems   Constitutional: Negative for chills and fever.   Respiratory: Negative for cough and shortness of breath.    Cardiovascular: Negative for chest pain and leg swelling.   Gastrointestinal: Negative for abdominal pain, diarrhea, nausea and vomiting.    Musculoskeletal: Negative for back pain, neck pain and neck stiffness.   Skin: Negative for rash and wound.   Neurological: Positive for tremors (bilateral hands; episodic). Negative for dizziness, light-headedness, numbness and headaches.   Hematological: Negative for adenopathy.        (+) low H/H   All other systems reviewed and are negative.    Physical Exam      Initial Vitals   BP Pulse Resp Temp SpO2   09/07/18 1532 09/07/18 1554 09/07/18 1532 09/07/18 1532 09/07/18 1554   139/66 107 20 98.2 °F (36.8 °C) 100 %      MAP       --                 Physical Exam  Nursing Notes and Vital Signs Reviewed.  Constitutional: Patient is in no acute distress. Well-developed. Elderly. Frail.  Head: Atraumatic. Normocephalic.  Eyes: PERRL. EOM intact. Conjunctivae are not pale. No scleral icterus.  ENT: Mucous membranes are moist. Oropharynx is clear and symmetric.    Neck: Supple. Full ROM. No lymphadenopathy.  Cardiovascular: Regular rate. Regular rhythm. No murmurs, rubs, or gallops. Distal pulses are 2+ and symmetric.  Pulmonary/Chest: No respiratory distress. Clear to auscultation bilaterally. No wheezing or rales.  Abdominal: Soft and non-distended.  There is no tenderness.  No rebound, guarding, or rigidity.   Musculoskeletal: Moves all extremities. No obvious deformities.   Skin: Warm and dry.  Neurological:  Alert, awake, and appropriate.  Normal speech.  No acute focal neurological deficits are appreciated.  Psychiatric: Normal affect. Good eye contact. Appropriate in content.    ED Course    Critical Care  Date/Time: 9/7/2018 8:08 PM  Performed by: Mary Sal MD  Authorized by: Mary Sal MD   Direct patient critical care time: 15 minutes  Additional history critical care time: 10 minutes  Ordering / reviewing critical care time: 5 minutes  Documentation critical care time: 5 minutes  Consulting other physicians critical care time: 5 minutes  Consult with family critical care time: 5  minutes  Total critical care time (exclusive of procedural time) : 45 minutes  Critical care time was exclusive of separately billable procedures and treating other patients.  Critical care was necessary to treat or prevent imminent or life-threatening deterioration of the following conditions: blood transfusion.  Critical care was time spent personally by me on the following activities: blood draw for specimens, development of treatment plan with patient or surrogate, evaluation of patient's response to treatment, examination of patient, obtaining history from patient or surrogate, ordering and performing treatments and interventions, ordering and review of laboratory studies, pulse oximetry, re-evaluation of patient's condition, review of old charts and discussions with consultants.        ED Vital Signs:  Vitals:    09/07/18 1532 09/07/18 1554 09/07/18 1914 09/07/18 1950   BP: 139/66 (!) 144/64 (!) 124/57 131/62   Pulse:  107 96 98   Resp: 20      Temp: 98.2 °F (36.8 °C)   98.5 °F (36.9 °C)   TempSrc: Oral   Oral   SpO2:  100% 100% 100%   Height: 5' (1.524 m)       09/07/18 1955 09/07/18 2010 09/07/18 2217   BP: 130/67 130/67 136/61   Pulse: 95 96 97   Resp: 16 16    Temp: 98.5 °F (36.9 °C) 98.4 °F (36.9 °C)    TempSrc: Oral Oral    SpO2: 100% 100% 100%   Height:          Abnormal Lab Results:  Labs Reviewed   CBC W/ AUTO DIFFERENTIAL - Abnormal; Notable for the following components:       Result Value    RBC 2.30 (*)     Hemoglobin 7.5 (*)     Hematocrit 23.2 (*)      (*)     MCH 32.6 (*)     RDW 19.7 (*)     Platelets 390 (*)     All other components within normal limits   COMPREHENSIVE METABOLIC PANEL - Abnormal; Notable for the following components:    Glucose 155 (*)     BUN, Bld 51 (*)     Creatinine 1.9 (*)     eGFR if  27 (*)     eGFR if non  23 (*)     All other components within normal limits   TYPE & SCREEN   ANTIBODY IDENTIFICATION   PREPARE RBC SOFT        All  Lab Results:  Results for orders placed or performed during the hospital encounter of 09/07/18   CBC auto differential   Result Value Ref Range    WBC 9.96 3.90 - 12.70 K/uL    RBC 2.30 (L) 4.00 - 5.40 M/uL    Hemoglobin 7.5 (L) 12.0 - 16.0 g/dL    Hematocrit 23.2 (L) 37.0 - 48.5 %     (H) 82 - 98 fL    MCH 32.6 (H) 27.0 - 31.0 pg    MCHC 32.3 32.0 - 36.0 g/dL    RDW 19.7 (H) 11.5 - 14.5 %    Platelets 390 (H) 150 - 350 K/uL    MPV 9.6 9.2 - 12.9 fL    Gran # (ANC) 5.0 1.8 - 7.7 K/uL    Lymph # 3.6 1.0 - 4.8 K/uL    Mono # 1.0 0.3 - 1.0 K/uL    Eos # 0.3 0.0 - 0.5 K/uL    Baso # 0.04 0.00 - 0.20 K/uL    Gran% 50.5 38.0 - 73.0 %    Lymph% 36.3 18.0 - 48.0 %    Mono% 9.6 4.0 - 15.0 %    Eosinophil% 3.2 0.0 - 8.0 %    Basophil% 0.4 0.0 - 1.9 %    Differential Method Automated    Comprehensive metabolic panel   Result Value Ref Range    Sodium 143 136 - 145 mmol/L    Potassium 4.9 3.5 - 5.1 mmol/L    Chloride 109 95 - 110 mmol/L    CO2 25 23 - 29 mmol/L    Glucose 155 (H) 70 - 110 mg/dL    BUN, Bld 51 (H) 8 - 23 mg/dL    Creatinine 1.9 (H) 0.5 - 1.4 mg/dL    Calcium 9.0 8.7 - 10.5 mg/dL    Total Protein 7.3 6.0 - 8.4 g/dL    Albumin 3.6 3.5 - 5.2 g/dL    Total Bilirubin 0.5 0.1 - 1.0 mg/dL    Alkaline Phosphatase 98 55 - 135 U/L    AST 21 10 - 40 U/L    ALT 11 10 - 44 U/L    Anion Gap 9 8 - 16 mmol/L    eGFR if African American 27 (A) >60 mL/min/1.73 m^2    eGFR if non African American 23 (A) >60 mL/min/1.73 m^2   Type & Screen   Result Value Ref Range    Group & Rh B NEG     Indirect Komal POS    Antibody identification   Result Value Ref Range    Antibody Identification POS    Prepare RBC 1 Unit   Result Value Ref Range    UNIT NUMBER [id number]     PRODUCT CODE X0559Q03     DISPENSE STATUS ISSUED     CODING SYSTEM [id number]     Unit Blood Type Code 9500     Unit Blood Type O NEG     Unit Expiration [id number]             The Emergency Provider reviewed the vital signs and test results, which are outlined  above.    ED Discussion     4:00 PM: Dr. Tapia transfers care of pt to Dr. Sal, pending lab results.    4:56 PM: Dr. Sal discussed the pt's case with Dr. Oquendo (nephrology) who recommends giving pt 1 unit of PRBC's and then discharging pt home.    5:59 PM: Re-evaluated pt. Pt is resting comfortably and is in no acute distress. Family is at bedside (son and daughter in law) Pt states there is no history to suggest acute blood loss. Pt has no vaginal bleeding, no blood in stool, no melena and no hematuria. D/w pt all pertinent results. Discussed need for blood transfusion, risks and benefits discussed at length, consents signed by son, patient/son verbalized understanding. D/w pt any concerns expressed at this time. Answered all questions. Pt expresses understanding at this time.     8:06 PM: Reassessed pt at this time.  Pt states her condition has improved at this time. Discussed with pt/family all pertinent ED information and results. Discussed pt dx and plan of tx. Pt is to f/u with Dr. Oquendo (nephrology) on Tuesday as scheduled. All questions and concerns were addressed at this time. Pt/family expresses understanding of information and instructions, and are comfortable with plan to discharge. Pt is stable for discharge.    I discussed with patient and/or family/caretaker that evaluation in the ED does not suggest any emergent or life threatening medical conditions requiring immediate intervention beyond what was provided in the ED, and I believe patient is safe for discharge.  Regardless, an unremarkable evaluation in the ED does not preclude the development or presence of a serious of life threatening condition. As such, patient was instructed to return immediately for any worsening or change in current symptoms.    ED Medication(s):  Medications   0.9%  NaCl infusion (for blood administration) (not administered)       Follow-up Information     Gabby Carrera MD. Schedule an appointment as soon as  possible for a visit in 2 days.    Specialty:  Family Medicine  Why:  Return to the Emergency Room, If symptoms worsen  Contact information:  [id number] [place] [address] [address] [place] [id number]  [phone]             Neeta Oquendo MD On 9/11/2018.    Specialty:  Nephrology  Contact information:  [address]  [place] [id number]  [phone]                     Medical Decision Making    Medical Decision Making:   Clinical Tests:   Lab Tests: Ordered and Reviewed           Scribe Attestation:   Scribe #1: I performed the above scribed service and the documentation accurately describes the services I performed. I attest to the accuracy of the note.    Attending:   Physician Attestation Statement for Scribe #1: I, Tato Tapia MD, personally performed the services described in this documentation, as scribed by Corinne Mack, in my presence, and it is both accurate and complete.       Scribe Attestation:   Scribe #2: I performed the above scribed service and the documentation accurately describes the services I performed. I attest to the accuracy of the note.    Attending Attestation:           Physician Attestation for Scribe:    Physician Attestation Statement for Scribe #2: I, Mary Sal MD, reviewed documentation, as scribed by Freda Cardenas in my presence, and it is both accurate and complete. I also acknowledge and confirm the content of the note done by Scribe #1.          Clinical Impression       ICD-10-CM ICD-9-CM   1. Anemia associated with stage 4 chronic renal failure N18.4 285.21    D63.1 585.4   2. History of iron deficiency anemia Z86.2 V12.3       Disposition:   Disposition: Discharged  Condition: Stable           Mary Sal MD  09/07/18 7567

## 2018-09-07 NOTE — TELEPHONE ENCOUNTER
----- Message from Janel Lux sent at 9/7/2018  1:43 PM CDT -----  Contact: Fatuma (pt's daughter)   Fatuma called and stated that the pt is going to the ER because her hemoglobin is low and she wants the doctor to visit the pt  if she is admitted to the hospital. She can be reached at [phone].    Thanks,  TF

## 2018-09-10 LAB — PATHOLOGIST INTERPRETATION AB/XM: NORMAL

## 2018-09-11 ENCOUNTER — OFFICE VISIT (OUTPATIENT)
Dept: NEPHROLOGY | Facility: CLINIC | Age: 83
End: 2018-09-11
Payer: MEDICARE

## 2018-09-11 VITALS
WEIGHT: 121.06 LBS | BODY MASS INDEX: 23.77 KG/M2 | HEIGHT: 60 IN | SYSTOLIC BLOOD PRESSURE: 132 MMHG | HEART RATE: 60 BPM | DIASTOLIC BLOOD PRESSURE: 70 MMHG

## 2018-09-11 DIAGNOSIS — E11.22 DIABETES MELLITUS WITH STAGE 4 CHRONIC KIDNEY DISEASE: ICD-10-CM

## 2018-09-11 DIAGNOSIS — D63.1 ANEMIA ASSOCIATED WITH CHRONIC RENAL FAILURE: ICD-10-CM

## 2018-09-11 DIAGNOSIS — E55.9 VITAMIN D DEFICIENCY: ICD-10-CM

## 2018-09-11 DIAGNOSIS — N18.9 ANEMIA ASSOCIATED WITH CHRONIC RENAL FAILURE: ICD-10-CM

## 2018-09-11 DIAGNOSIS — I10 ESSENTIAL HYPERTENSION: ICD-10-CM

## 2018-09-11 DIAGNOSIS — I73.9 PERIPHERAL VASCULAR DISEASE: ICD-10-CM

## 2018-09-11 DIAGNOSIS — N18.4 DIABETES MELLITUS WITH STAGE 4 CHRONIC KIDNEY DISEASE: ICD-10-CM

## 2018-09-11 DIAGNOSIS — N18.4 CKD (CHRONIC KIDNEY DISEASE), STAGE IV: Primary | ICD-10-CM

## 2018-09-11 DIAGNOSIS — N25.81 HYPERPARATHYROIDISM, SECONDARY RENAL: ICD-10-CM

## 2018-09-11 DIAGNOSIS — M10.30 GOUT DUE TO RENAL IMPAIRMENT, UNSPECIFIED CHRONICITY, UNSPECIFIED SITE: ICD-10-CM

## 2018-09-11 PROCEDURE — 99213 OFFICE O/P EST LOW 20 MIN: CPT | Mod: PBBFAC | Performed by: INTERNAL MEDICINE

## 2018-09-11 PROCEDURE — 99499 UNLISTED E&M SERVICE: CPT | Mod: S$GLB,,, | Performed by: INTERNAL MEDICINE

## 2018-09-11 PROCEDURE — 99214 OFFICE O/P EST MOD 30 MIN: CPT | Mod: S$PBB,,, | Performed by: INTERNAL MEDICINE

## 2018-09-11 PROCEDURE — 99999 PR PBB SHADOW E&M-EST. PATIENT-LVL III: CPT | Mod: PBBFAC,,, | Performed by: INTERNAL MEDICINE

## 2018-09-11 PROCEDURE — 1101F PT FALLS ASSESS-DOCD LE1/YR: CPT | Mod: CPTII,,, | Performed by: INTERNAL MEDICINE

## 2018-09-11 NOTE — PATIENT INSTRUCTIONS
1. CKD- stage IV: stable in last 12 months 20 %.  Overall patient's quality of life is getting worse with time.  We will need advanced directive and down consider palliative care at some point.  Discussed with the family today--patient is DNR no dialysis this comfort measures only    2. Anemia: refused GI evaluation ( refused work up) ; status post 1 unit of blood transfusion earlier this month in the emergency room.  Detailed orders for nursing home written.  Will start the patient on Procrit 08251 units twice a month.  Will also start the patient on iron sulfate 325 mg daily.  Check monthly labs with CBC and renal panel.  Will have the labs faxed to us at [phone].  Follow-up in 3 months.      3. Hypertension:controlled: better on norvasc 5 mg daily     4. Hyperparathyroidism:stable PTH ;keep Ergo weekly            Followup 3  month     Dr.Naseer Oquendo
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
## 2018-09-17 ENCOUNTER — TELEPHONE (OUTPATIENT)
Dept: NEPHROLOGY | Facility: CLINIC | Age: 83
End: 2018-09-17

## 2018-09-17 NOTE — TELEPHONE ENCOUNTER
Victorina call to notify office that authorization is pending for Procrit. She will notify office when it is approved.

## 2018-09-17 NOTE — TELEPHONE ENCOUNTER
----- Message from Aniyah Riley sent at 9/17/2018  3:28 PM CDT -----  Victorina ( [facility] ) is requesting a call from nurse to discuss the pt medications.            Please call Victorina ( [facility] ) back at [phone]

## 2018-10-08 ENCOUNTER — TELEPHONE (OUTPATIENT)
Dept: NEPHROLOGY | Facility: CLINIC | Age: 83
End: 2018-10-08

## 2018-10-08 DIAGNOSIS — N18.4 CKD (CHRONIC KIDNEY DISEASE), STAGE IV: Primary | Chronic | ICD-10-CM

## 2018-10-08 NOTE — TELEPHONE ENCOUNTER
----- Message from Lashanda Polo sent at 10/8/2018  4:06 PM CDT -----  Contact: Sandi with [facility]  Calling regarding getting an order to admit patient in to hospice and need history and physical on patient faxed over. Please call Sandi @ [phone] and fax [phone]. Thanks, sulaiman

## 2018-10-08 NOTE — TELEPHONE ENCOUNTER
Returned call to Sandi with [facility]. She would like to know know if you can write an order for hospice for patient. Patient's son is requesting to take patient home from the nursing home for 24 hour care. Please advise

## 2018-10-09 DIAGNOSIS — N18.4 CKD (CHRONIC KIDNEY DISEASE), STAGE IV: Primary | Chronic | ICD-10-CM

## 2018-11-30 ENCOUNTER — TELEPHONE (OUTPATIENT)
Dept: NEPHROLOGY | Facility: CLINIC | Age: 83
End: 2018-11-30

## 2018-11-30 NOTE — TELEPHONE ENCOUNTER
Called to reschedule patient's appointment. The [phone] number the gentleman told me that I had the wrong number and no one answered the second number, left message.

## 2018-12-24 ENCOUNTER — PATIENT OUTREACH (OUTPATIENT)
Dept: ADMINISTRATIVE | Facility: HOSPITAL | Age: 83
End: 2018-12-24

## 2019-01-29 ENCOUNTER — OFFICE VISIT (OUTPATIENT)
Dept: NEPHROLOGY | Facility: CLINIC | Age: 84
End: 2019-01-29
Payer: MEDICARE

## 2019-01-29 VITALS
BODY MASS INDEX: 23.45 KG/M2 | HEIGHT: 62 IN | DIASTOLIC BLOOD PRESSURE: 54 MMHG | HEART RATE: 80 BPM | WEIGHT: 127.44 LBS | SYSTOLIC BLOOD PRESSURE: 136 MMHG

## 2019-01-29 DIAGNOSIS — D63.1 ANEMIA ASSOCIATED WITH CHRONIC RENAL FAILURE: ICD-10-CM

## 2019-01-29 DIAGNOSIS — N18.9 ANEMIA ASSOCIATED WITH CHRONIC RENAL FAILURE: ICD-10-CM

## 2019-01-29 DIAGNOSIS — N18.4 DIABETES MELLITUS WITH STAGE 4 CHRONIC KIDNEY DISEASE: ICD-10-CM

## 2019-01-29 DIAGNOSIS — N18.4 CKD (CHRONIC KIDNEY DISEASE), STAGE IV: Primary | ICD-10-CM

## 2019-01-29 DIAGNOSIS — I10 ESSENTIAL HYPERTENSION: ICD-10-CM

## 2019-01-29 DIAGNOSIS — N25.81 HYPERPARATHYROIDISM, SECONDARY RENAL: ICD-10-CM

## 2019-01-29 DIAGNOSIS — I73.9 PERIPHERAL VASCULAR DISEASE: ICD-10-CM

## 2019-01-29 DIAGNOSIS — M10.30 GOUT DUE TO RENAL IMPAIRMENT, UNSPECIFIED CHRONICITY, UNSPECIFIED SITE: ICD-10-CM

## 2019-01-29 DIAGNOSIS — E55.9 VITAMIN D DEFICIENCY: ICD-10-CM

## 2019-01-29 DIAGNOSIS — E11.22 DIABETES MELLITUS WITH STAGE 4 CHRONIC KIDNEY DISEASE: ICD-10-CM

## 2019-01-29 PROCEDURE — 99214 PR OFFICE/OUTPT VISIT, EST, LEVL IV, 30-39 MIN: ICD-10-PCS | Mod: HCNC,S$GLB,, | Performed by: INTERNAL MEDICINE

## 2019-01-29 PROCEDURE — 99999 PR PBB SHADOW E&M-EST. PATIENT-LVL III: ICD-10-PCS | Mod: PBBFAC,HCNC,, | Performed by: INTERNAL MEDICINE

## 2019-01-29 PROCEDURE — 99214 OFFICE O/P EST MOD 30 MIN: CPT | Mod: HCNC,S$GLB,, | Performed by: INTERNAL MEDICINE

## 2019-01-29 PROCEDURE — 99499 RISK ADDL DX/OHS AUDIT: ICD-10-PCS | Mod: S$GLB,,, | Performed by: INTERNAL MEDICINE

## 2019-01-29 PROCEDURE — 1101F PT FALLS ASSESS-DOCD LE1/YR: CPT | Mod: HCNC,CPTII,S$GLB, | Performed by: INTERNAL MEDICINE

## 2019-01-29 PROCEDURE — 1101F PR PT FALLS ASSESS DOC 0-1 FALLS W/OUT INJ PAST YR: ICD-10-PCS | Mod: HCNC,CPTII,S$GLB, | Performed by: INTERNAL MEDICINE

## 2019-01-29 PROCEDURE — 99999 PR PBB SHADOW E&M-EST. PATIENT-LVL III: CPT | Mod: PBBFAC,HCNC,, | Performed by: INTERNAL MEDICINE

## 2019-01-29 PROCEDURE — 99499 UNLISTED E&M SERVICE: CPT | Mod: S$GLB,,, | Performed by: INTERNAL MEDICINE
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
## 2019-01-30 ENCOUNTER — TELEPHONE (OUTPATIENT)
Dept: NEPHROLOGY | Facility: CLINIC | Age: 84
End: 2019-01-30

## 2019-01-30 NOTE — TELEPHONE ENCOUNTER
----- Message from Neeta Oquendo MD sent at 1/30/2019 11:15 AM CST -----  The nursing home has not been checking monthly labs like I had ordered last time.  Please check CBC renal panel now and have them fax it to us now.  Also please schedule her to come back in 3 months.  She had a need to also fax us all the labs when the do it    Check monthly labs with CBC and renal panel.    Follow-up in 3 months

## 2019-02-15 ENCOUNTER — PATIENT MESSAGE (OUTPATIENT)
Dept: NEPHROLOGY | Facility: CLINIC | Age: 84
End: 2019-02-15

## 2019-05-21 ENCOUNTER — OFFICE VISIT (OUTPATIENT)
Dept: NEPHROLOGY | Facility: CLINIC | Age: 84
End: 2019-05-21
Payer: MEDICARE

## 2019-05-21 VITALS
HEIGHT: 60 IN | HEART RATE: 100 BPM | SYSTOLIC BLOOD PRESSURE: 90 MMHG | DIASTOLIC BLOOD PRESSURE: 50 MMHG | BODY MASS INDEX: 24.89 KG/M2

## 2019-05-21 DIAGNOSIS — N25.81 HYPERPARATHYROIDISM, SECONDARY RENAL: ICD-10-CM

## 2019-05-21 DIAGNOSIS — N18.9 ANEMIA ASSOCIATED WITH CHRONIC RENAL FAILURE: ICD-10-CM

## 2019-05-21 DIAGNOSIS — E11.22 DIABETES MELLITUS WITH STAGE 4 CHRONIC KIDNEY DISEASE: ICD-10-CM

## 2019-05-21 DIAGNOSIS — N18.4 CKD (CHRONIC KIDNEY DISEASE), STAGE IV: Primary | ICD-10-CM

## 2019-05-21 DIAGNOSIS — I10 ESSENTIAL HYPERTENSION: ICD-10-CM

## 2019-05-21 DIAGNOSIS — D63.1 ANEMIA ASSOCIATED WITH CHRONIC RENAL FAILURE: ICD-10-CM

## 2019-05-21 DIAGNOSIS — E55.9 VITAMIN D DEFICIENCY: ICD-10-CM

## 2019-05-21 DIAGNOSIS — N18.4 DIABETES MELLITUS WITH STAGE 4 CHRONIC KIDNEY DISEASE: ICD-10-CM

## 2019-05-21 PROCEDURE — 99214 OFFICE O/P EST MOD 30 MIN: CPT | Mod: HCNC,S$GLB,, | Performed by: INTERNAL MEDICINE

## 2019-05-21 PROCEDURE — 99499 UNLISTED E&M SERVICE: CPT | Mod: S$GLB,,, | Performed by: INTERNAL MEDICINE

## 2019-05-21 PROCEDURE — 99499 RISK ADDL DX/OHS AUDIT: ICD-10-PCS | Mod: S$GLB,,, | Performed by: INTERNAL MEDICINE

## 2019-05-21 PROCEDURE — 1101F PR PT FALLS ASSESS DOC 0-1 FALLS W/OUT INJ PAST YR: ICD-10-PCS | Mod: HCNC,CPTII,S$GLB, | Performed by: INTERNAL MEDICINE

## 2019-05-21 PROCEDURE — 99999 PR PBB SHADOW E&M-EST. PATIENT-LVL III: ICD-10-PCS | Mod: PBBFAC,HCNC,, | Performed by: INTERNAL MEDICINE

## 2019-05-21 PROCEDURE — 99999 PR PBB SHADOW E&M-EST. PATIENT-LVL III: CPT | Mod: PBBFAC,HCNC,, | Performed by: INTERNAL MEDICINE

## 2019-05-21 PROCEDURE — 99214 PR OFFICE/OUTPT VISIT, EST, LEVL IV, 30-39 MIN: ICD-10-PCS | Mod: HCNC,S$GLB,, | Performed by: INTERNAL MEDICINE

## 2019-05-21 PROCEDURE — 1101F PT FALLS ASSESS-DOCD LE1/YR: CPT | Mod: HCNC,CPTII,S$GLB, | Performed by: INTERNAL MEDICINE

## 2019-05-21 RX ORDER — ESCITALOPRAM OXALATE 10 MG/1
10 TABLET ORAL DAILY
COMMUNITY

## 2019-05-21 RX ORDER — FAMOTIDINE 20 MG/1
20 TABLET, FILM COATED ORAL 2 TIMES DAILY
COMMUNITY

## 2019-05-21 RX ORDER — FERROUS SULFATE 325(65) MG
325 TABLET ORAL
COMMUNITY
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
## 2019-05-21 NOTE — PATIENT INSTRUCTIONS
Patient is slowly deteriorating with advanced kidney failure.  Recommend comfort measures only at this time.  Continue Procrit 94106 units subcutaneously twice a month.  Hold Procrit shot if the hemoglobin is greater than 11.  Can adjust the dose by the primary care physician at the nursing home.    Follow-up with us as needed    Please call for questions

## 2019-06-13 ENCOUNTER — PES CALL (OUTPATIENT)
Dept: ADMINISTRATIVE | Facility: CLINIC | Age: 84
End: 2019-06-13

## 2020-01-28 ENCOUNTER — TELEPHONE (OUTPATIENT)
Dept: ADMINISTRATIVE | Facility: HOSPITAL | Age: 85
End: 2020-01-28
